# Patient Record
Sex: FEMALE | Race: WHITE | NOT HISPANIC OR LATINO | Employment: OTHER | URBAN - METROPOLITAN AREA
[De-identification: names, ages, dates, MRNs, and addresses within clinical notes are randomized per-mention and may not be internally consistent; named-entity substitution may affect disease eponyms.]

---

## 2020-01-01 ENCOUNTER — HOSPITAL ENCOUNTER (OUTPATIENT)
Dept: RADIOLOGY | Facility: MEDICAL CENTER | Age: 80
End: 2020-05-01
Payer: MEDICARE

## 2020-01-01 ENCOUNTER — APPOINTMENT (OUTPATIENT)
Dept: RADIOLOGY | Facility: MEDICAL CENTER | Age: 80
DRG: 870 | End: 2020-01-01
Attending: INTERNAL MEDICINE
Payer: MEDICARE

## 2020-01-01 ENCOUNTER — PATIENT OUTREACH (OUTPATIENT)
Dept: HEALTH INFORMATION MANAGEMENT | Facility: OTHER | Age: 80
End: 2020-01-01

## 2020-01-01 ENCOUNTER — HOSPITAL ENCOUNTER (INPATIENT)
Facility: MEDICAL CENTER | Age: 80
LOS: 15 days | DRG: 870 | End: 2020-05-16
Attending: EMERGENCY MEDICINE | Admitting: HOSPITALIST
Payer: MEDICARE

## 2020-01-01 ENCOUNTER — APPOINTMENT (OUTPATIENT)
Dept: CARDIOLOGY | Facility: MEDICAL CENTER | Age: 80
DRG: 870 | End: 2020-01-01
Attending: HOSPITALIST
Payer: MEDICARE

## 2020-01-01 ENCOUNTER — APPOINTMENT (OUTPATIENT)
Dept: RADIOLOGY | Facility: MEDICAL CENTER | Age: 80
DRG: 870 | End: 2020-01-01
Attending: HOSPITALIST
Payer: MEDICARE

## 2020-01-01 DIAGNOSIS — N18.9 CHRONIC RENAL FAILURE, UNSPECIFIED CKD STAGE: ICD-10-CM

## 2020-01-01 DIAGNOSIS — J96.01 ACUTE RESPIRATORY FAILURE WITH HYPOXIA AND HYPERCAPNIA (HCC): ICD-10-CM

## 2020-01-01 DIAGNOSIS — I50.9 CONGESTIVE HEART FAILURE, UNSPECIFIED HF CHRONICITY, UNSPECIFIED HEART FAILURE TYPE (HCC): ICD-10-CM

## 2020-01-01 DIAGNOSIS — Z51.5 COMFORT MEASURES ONLY STATUS: ICD-10-CM

## 2020-01-01 DIAGNOSIS — J96.02 ACUTE RESPIRATORY FAILURE WITH HYPOXIA AND HYPERCAPNIA (HCC): ICD-10-CM

## 2020-01-01 DIAGNOSIS — I27.20 PULMONARY HYPERTENSION (HCC): ICD-10-CM

## 2020-01-01 LAB
ACTION RANGE TRIGGERED IACRT: NO
ACTION RANGE TRIGGERED IACRT: YES
ALBUMIN SERPL BCP-MCNC: 2.6 G/DL (ref 3.2–4.9)
ALBUMIN SERPL BCP-MCNC: 2.9 G/DL (ref 3.2–4.9)
ALBUMIN SERPL BCP-MCNC: 2.9 G/DL (ref 3.2–4.9)
ALBUMIN SERPL BCP-MCNC: 3.2 G/DL (ref 3.2–4.9)
ALBUMIN SERPL BCP-MCNC: 3.3 G/DL (ref 3.2–4.9)
ALBUMIN/GLOB SERPL: 0.9 G/DL
ALBUMIN/GLOB SERPL: 1 G/DL
ALBUMIN/GLOB SERPL: 1 G/DL
ALBUMIN/GLOB SERPL: 1.1 G/DL
ALBUMIN/GLOB SERPL: 1.2 G/DL
ALP SERPL-CCNC: 61 U/L (ref 30–99)
ALP SERPL-CCNC: 63 U/L (ref 30–99)
ALP SERPL-CCNC: 73 U/L (ref 30–99)
ALP SERPL-CCNC: 74 U/L (ref 30–99)
ALP SERPL-CCNC: 75 U/L (ref 30–99)
ALT SERPL-CCNC: 13 U/L (ref 2–50)
ALT SERPL-CCNC: 21 U/L (ref 2–50)
ALT SERPL-CCNC: 22 U/L (ref 2–50)
ALT SERPL-CCNC: 35 U/L (ref 2–50)
ALT SERPL-CCNC: 38 U/L (ref 2–50)
AMMONIA PLAS-SCNC: 21 UMOL/L (ref 11–45)
ANION GAP SERPL CALC-SCNC: 10 MMOL/L (ref 7–16)
ANION GAP SERPL CALC-SCNC: 11 MMOL/L (ref 7–16)
ANION GAP SERPL CALC-SCNC: 11 MMOL/L (ref 7–16)
ANION GAP SERPL CALC-SCNC: 12 MMOL/L (ref 7–16)
ANION GAP SERPL CALC-SCNC: 13 MMOL/L (ref 7–16)
ANION GAP SERPL CALC-SCNC: 15 MMOL/L (ref 7–16)
ANION GAP SERPL CALC-SCNC: 16 MMOL/L (ref 7–16)
ANION GAP SERPL CALC-SCNC: 16 MMOL/L (ref 7–16)
ANION GAP SERPL CALC-SCNC: 20 MMOL/L (ref 7–16)
ANISOCYTOSIS BLD QL SMEAR: ABNORMAL
APPEARANCE UR: CLEAR
APTT PPP: 116.8 SEC (ref 24.7–36)
APTT PPP: 222.5 SEC (ref 24.7–36)
APTT PPP: 27.3 SEC (ref 24.7–36)
APTT PPP: 47.3 SEC (ref 24.7–36)
APTT PPP: 60.2 SEC (ref 24.7–36)
APTT PPP: 60.4 SEC (ref 24.7–36)
APTT PPP: 80.1 SEC (ref 24.7–36)
APTT PPP: 93 SEC (ref 24.7–36)
APTT PPP: >240 SEC (ref 24.7–36)
AST SERPL-CCNC: 18 U/L (ref 12–45)
AST SERPL-CCNC: 25 U/L (ref 12–45)
AST SERPL-CCNC: 26 U/L (ref 12–45)
AST SERPL-CCNC: 31 U/L (ref 12–45)
AST SERPL-CCNC: 39 U/L (ref 12–45)
BACTERIA BLD CULT: NORMAL
BACTERIA BLD CULT: NORMAL
BACTERIA SPEC RESP CULT: NORMAL
BASE EXCESS BLDA CALC-SCNC: 10 MMOL/L (ref -4–3)
BASE EXCESS BLDA CALC-SCNC: 13 MMOL/L (ref -4–3)
BASE EXCESS BLDA CALC-SCNC: 13 MMOL/L (ref -4–3)
BASE EXCESS BLDA CALC-SCNC: 2 MMOL/L (ref -4–3)
BASE EXCESS BLDA CALC-SCNC: 3 MMOL/L (ref -4–3)
BASE EXCESS BLDA CALC-SCNC: 3 MMOL/L (ref -4–3)
BASE EXCESS BLDA CALC-SCNC: 6 MMOL/L (ref -4–3)
BASE EXCESS BLDA CALC-SCNC: 7 MMOL/L (ref -4–3)
BASE EXCESS BLDA CALC-SCNC: 7 MMOL/L (ref -4–3)
BASE EXCESS BLDA CALC-SCNC: 8 MMOL/L (ref -4–3)
BASE EXCESS BLDA CALC-SCNC: 9 MMOL/L (ref -4–3)
BASE EXCESS BLDA CALC-SCNC: ABNORMAL MMOL/L (ref -4–3)
BASE EXCESS BLDA CALC-SCNC: ABNORMAL MMOL/L (ref -4–3)
BASOPHILS # BLD AUTO: 0.1 % (ref 0–1.8)
BASOPHILS # BLD AUTO: 0.2 % (ref 0–1.8)
BASOPHILS # BLD AUTO: 0.3 % (ref 0–1.8)
BASOPHILS # BLD: 0.02 K/UL (ref 0–0.12)
BASOPHILS # BLD: 0.03 K/UL (ref 0–0.12)
BASOPHILS # BLD: 0.04 K/UL (ref 0–0.12)
BILIRUB SERPL-MCNC: 0.4 MG/DL (ref 0.1–1.5)
BILIRUB SERPL-MCNC: 0.6 MG/DL (ref 0.1–1.5)
BILIRUB SERPL-MCNC: 0.6 MG/DL (ref 0.1–1.5)
BILIRUB SERPL-MCNC: 0.7 MG/DL (ref 0.1–1.5)
BILIRUB SERPL-MCNC: 0.7 MG/DL (ref 0.1–1.5)
BILIRUB UR QL STRIP.AUTO: NEGATIVE
BODY TEMPERATURE: ABNORMAL DEGREES
BUN SERPL-MCNC: 115 MG/DL (ref 8–22)
BUN SERPL-MCNC: 133 MG/DL (ref 8–22)
BUN SERPL-MCNC: 33 MG/DL (ref 8–22)
BUN SERPL-MCNC: 36 MG/DL (ref 8–22)
BUN SERPL-MCNC: 41 MG/DL (ref 8–22)
BUN SERPL-MCNC: 43 MG/DL (ref 8–22)
BUN SERPL-MCNC: 50 MG/DL (ref 8–22)
BUN SERPL-MCNC: 53 MG/DL (ref 8–22)
BUN SERPL-MCNC: 64 MG/DL (ref 8–22)
BUN SERPL-MCNC: 67 MG/DL (ref 8–22)
BUN SERPL-MCNC: 79 MG/DL (ref 8–22)
BUN SERPL-MCNC: 85 MG/DL (ref 8–22)
BUN SERPL-MCNC: 89 MG/DL (ref 8–22)
BUN SERPL-MCNC: 94 MG/DL (ref 8–22)
BUN SERPL-MCNC: 96 MG/DL (ref 8–22)
C DIFF DNA SPEC QL NAA+PROBE: NEGATIVE
C DIFF TOX GENS STL QL NAA+PROBE: NEGATIVE
CA-I BLD ISE-SCNC: 1.19 MMOL/L (ref 1.1–1.3)
CALCIUM SERPL-MCNC: 10 MG/DL (ref 8.5–10.5)
CALCIUM SERPL-MCNC: 8.3 MG/DL (ref 8.5–10.5)
CALCIUM SERPL-MCNC: 8.5 MG/DL (ref 8.5–10.5)
CALCIUM SERPL-MCNC: 8.7 MG/DL (ref 8.5–10.5)
CALCIUM SERPL-MCNC: 8.8 MG/DL (ref 8.5–10.5)
CALCIUM SERPL-MCNC: 8.8 MG/DL (ref 8.5–10.5)
CALCIUM SERPL-MCNC: 9 MG/DL (ref 8.5–10.5)
CALCIUM SERPL-MCNC: 9.1 MG/DL (ref 8.5–10.5)
CALCIUM SERPL-MCNC: 9.3 MG/DL (ref 8.5–10.5)
CALCIUM SERPL-MCNC: 9.5 MG/DL (ref 8.5–10.5)
CALCIUM SERPL-MCNC: 9.7 MG/DL (ref 8.5–10.5)
CALCIUM SERPL-MCNC: 9.8 MG/DL (ref 8.5–10.5)
CALCIUM SERPL-MCNC: 9.9 MG/DL (ref 8.5–10.5)
CHLORIDE SERPL-SCNC: 100 MMOL/L (ref 96–112)
CHLORIDE SERPL-SCNC: 90 MMOL/L (ref 96–112)
CHLORIDE SERPL-SCNC: 91 MMOL/L (ref 96–112)
CHLORIDE SERPL-SCNC: 92 MMOL/L (ref 96–112)
CHLORIDE SERPL-SCNC: 93 MMOL/L (ref 96–112)
CHLORIDE SERPL-SCNC: 94 MMOL/L (ref 96–112)
CHLORIDE SERPL-SCNC: 94 MMOL/L (ref 96–112)
CHLORIDE SERPL-SCNC: 95 MMOL/L (ref 96–112)
CHLORIDE SERPL-SCNC: 96 MMOL/L (ref 96–112)
CHLORIDE SERPL-SCNC: 98 MMOL/L (ref 96–112)
CHLORIDE SERPL-SCNC: 99 MMOL/L (ref 96–112)
CHLORIDE UR-SCNC: 73 MMOL/L
CO2 BLDA-SCNC: 31 MMOL/L (ref 20–33)
CO2 BLDA-SCNC: 32 MMOL/L (ref 20–33)
CO2 BLDA-SCNC: 33 MMOL/L (ref 20–33)
CO2 BLDA-SCNC: 33 MMOL/L (ref 20–33)
CO2 BLDA-SCNC: 35 MMOL/L (ref 20–33)
CO2 BLDA-SCNC: 36 MMOL/L (ref 20–33)
CO2 BLDA-SCNC: 37 MMOL/L (ref 20–33)
CO2 BLDA-SCNC: 38 MMOL/L (ref 20–33)
CO2 BLDA-SCNC: 38 MMOL/L (ref 20–33)
CO2 BLDA-SCNC: 39 MMOL/L (ref 20–33)
CO2 BLDA-SCNC: 39 MMOL/L (ref 20–33)
CO2 BLDA-SCNC: 40 MMOL/L (ref 20–33)
CO2 BLDA-SCNC: ABNORMAL MMOL/L (ref 20–33)
CO2 BLDA-SCNC: ABNORMAL MMOL/L (ref 20–33)
CO2 SERPL-SCNC: 26 MMOL/L (ref 20–33)
CO2 SERPL-SCNC: 28 MMOL/L (ref 20–33)
CO2 SERPL-SCNC: 29 MMOL/L (ref 20–33)
CO2 SERPL-SCNC: 29 MMOL/L (ref 20–33)
CO2 SERPL-SCNC: 30 MMOL/L (ref 20–33)
CO2 SERPL-SCNC: 31 MMOL/L (ref 20–33)
CO2 SERPL-SCNC: 32 MMOL/L (ref 20–33)
CO2 SERPL-SCNC: 33 MMOL/L (ref 20–33)
COLOR UR: YELLOW
COMMENT 1642: NORMAL
COMMENT 1642: NORMAL
CORTIS SERPL-MCNC: 22.1 UG/DL (ref 0–23)
COVID ORDER STATUS COVID19: NORMAL
CREAT SERPL-MCNC: 0.84 MG/DL (ref 0.5–1.4)
CREAT SERPL-MCNC: 0.88 MG/DL (ref 0.5–1.4)
CREAT SERPL-MCNC: 0.9 MG/DL (ref 0.5–1.4)
CREAT SERPL-MCNC: 0.99 MG/DL (ref 0.5–1.4)
CREAT SERPL-MCNC: 1.11 MG/DL (ref 0.5–1.4)
CREAT SERPL-MCNC: 1.18 MG/DL (ref 0.5–1.4)
CREAT SERPL-MCNC: 1.21 MG/DL (ref 0.5–1.4)
CREAT SERPL-MCNC: 1.28 MG/DL (ref 0.5–1.4)
CREAT SERPL-MCNC: 1.33 MG/DL (ref 0.5–1.4)
CREAT SERPL-MCNC: 1.42 MG/DL (ref 0.5–1.4)
CREAT SERPL-MCNC: 1.42 MG/DL (ref 0.5–1.4)
CREAT SERPL-MCNC: 2.08 MG/DL (ref 0.5–1.4)
CREAT SERPL-MCNC: 2.41 MG/DL (ref 0.5–1.4)
CREAT UR-MCNC: 30.66 MG/DL
CRP SERPL HS-MCNC: 1.56 MG/DL (ref 0–0.75)
CRP SERPL HS-MCNC: 11.11 MG/DL (ref 0–0.75)
CRP SERPL HS-MCNC: 3.03 MG/DL (ref 0–0.75)
CRP SERPL HS-MCNC: 9.33 MG/DL (ref 0–0.75)
D DIMER PPP IA.FEU-MCNC: 1.19 UG/ML (FEU) (ref 0–0.5)
EKG IMPRESSION: NORMAL
EOSINOPHIL # BLD AUTO: 0 K/UL (ref 0–0.51)
EOSINOPHIL # BLD AUTO: 0.01 K/UL (ref 0–0.51)
EOSINOPHIL # BLD AUTO: 0.02 K/UL (ref 0–0.51)
EOSINOPHIL # BLD AUTO: 0.09 K/UL (ref 0–0.51)
EOSINOPHIL # BLD AUTO: 0.1 K/UL (ref 0–0.51)
EOSINOPHIL # BLD AUTO: 0.15 K/UL (ref 0–0.51)
EOSINOPHIL # BLD AUTO: 0.16 K/UL (ref 0–0.51)
EOSINOPHIL # BLD AUTO: 0.27 K/UL (ref 0–0.51)
EOSINOPHIL # BLD AUTO: 0.29 K/UL (ref 0–0.51)
EOSINOPHIL # BLD AUTO: 0.36 K/UL (ref 0–0.51)
EOSINOPHIL # BLD AUTO: 0.44 K/UL (ref 0–0.51)
EOSINOPHIL NFR BLD: 0 % (ref 0–6.9)
EOSINOPHIL NFR BLD: 0.1 % (ref 0–6.9)
EOSINOPHIL NFR BLD: 0.7 % (ref 0–6.9)
EOSINOPHIL NFR BLD: 0.8 % (ref 0–6.9)
EOSINOPHIL NFR BLD: 1.3 % (ref 0–6.9)
EOSINOPHIL NFR BLD: 1.5 % (ref 0–6.9)
EOSINOPHIL NFR BLD: 1.6 % (ref 0–6.9)
EOSINOPHIL NFR BLD: 2 % (ref 0–6.9)
EOSINOPHIL NFR BLD: 3.5 % (ref 0–6.9)
EOSINOPHIL NFR BLD: 3.6 % (ref 0–6.9)
ERYTHROCYTE [DISTWIDTH] IN BLOOD BY AUTOMATED COUNT: 57.1 FL (ref 35.9–50)
ERYTHROCYTE [DISTWIDTH] IN BLOOD BY AUTOMATED COUNT: 57.7 FL (ref 35.9–50)
ERYTHROCYTE [DISTWIDTH] IN BLOOD BY AUTOMATED COUNT: 58.6 FL (ref 35.9–50)
ERYTHROCYTE [DISTWIDTH] IN BLOOD BY AUTOMATED COUNT: 58.6 FL (ref 35.9–50)
ERYTHROCYTE [DISTWIDTH] IN BLOOD BY AUTOMATED COUNT: 59.6 FL (ref 35.9–50)
ERYTHROCYTE [DISTWIDTH] IN BLOOD BY AUTOMATED COUNT: 59.7 FL (ref 35.9–50)
ERYTHROCYTE [DISTWIDTH] IN BLOOD BY AUTOMATED COUNT: 59.9 FL (ref 35.9–50)
ERYTHROCYTE [DISTWIDTH] IN BLOOD BY AUTOMATED COUNT: 60 FL (ref 35.9–50)
ERYTHROCYTE [DISTWIDTH] IN BLOOD BY AUTOMATED COUNT: 60.3 FL (ref 35.9–50)
ERYTHROCYTE [DISTWIDTH] IN BLOOD BY AUTOMATED COUNT: 60.6 FL (ref 35.9–50)
ERYTHROCYTE [DISTWIDTH] IN BLOOD BY AUTOMATED COUNT: 61.1 FL (ref 35.9–50)
ERYTHROCYTE [DISTWIDTH] IN BLOOD BY AUTOMATED COUNT: 61.3 FL (ref 35.9–50)
ERYTHROCYTE [DISTWIDTH] IN BLOOD BY AUTOMATED COUNT: 62.5 FL (ref 35.9–50)
ERYTHROCYTE [DISTWIDTH] IN BLOOD BY AUTOMATED COUNT: 63.5 FL (ref 35.9–50)
ERYTHROCYTE [DISTWIDTH] IN BLOOD BY AUTOMATED COUNT: 64.5 FL (ref 35.9–50)
FERRITIN SERPL-MCNC: 14.7 NG/ML (ref 10–291)
GLOBULIN SER CALC-MCNC: 2.4 G/DL (ref 1.9–3.5)
GLOBULIN SER CALC-MCNC: 2.6 G/DL (ref 1.9–3.5)
GLOBULIN SER CALC-MCNC: 2.7 G/DL (ref 1.9–3.5)
GLOBULIN SER CALC-MCNC: 3.2 G/DL (ref 1.9–3.5)
GLOBULIN SER CALC-MCNC: 3.6 G/DL (ref 1.9–3.5)
GLUCOSE BLD-MCNC: 83 MG/DL (ref 65–99)
GLUCOSE SERPL-MCNC: 111 MG/DL (ref 65–99)
GLUCOSE SERPL-MCNC: 117 MG/DL (ref 65–99)
GLUCOSE SERPL-MCNC: 126 MG/DL (ref 65–99)
GLUCOSE SERPL-MCNC: 126 MG/DL (ref 65–99)
GLUCOSE SERPL-MCNC: 127 MG/DL (ref 65–99)
GLUCOSE SERPL-MCNC: 134 MG/DL (ref 65–99)
GLUCOSE SERPL-MCNC: 138 MG/DL (ref 65–99)
GLUCOSE SERPL-MCNC: 156 MG/DL (ref 65–99)
GLUCOSE SERPL-MCNC: 160 MG/DL (ref 65–99)
GLUCOSE SERPL-MCNC: 174 MG/DL (ref 65–99)
GLUCOSE SERPL-MCNC: 178 MG/DL (ref 65–99)
GLUCOSE SERPL-MCNC: 199 MG/DL (ref 65–99)
GLUCOSE SERPL-MCNC: 80 MG/DL (ref 65–99)
GLUCOSE SERPL-MCNC: 88 MG/DL (ref 65–99)
GLUCOSE SERPL-MCNC: 98 MG/DL (ref 65–99)
GLUCOSE UR STRIP.AUTO-MCNC: NEGATIVE MG/DL
GRAM STN SPEC: NORMAL
GRAM STN SPEC: NORMAL
HCO3 BLDA-SCNC: 29.4 MMOL/L (ref 17–25)
HCO3 BLDA-SCNC: 29.8 MMOL/L (ref 17–25)
HCO3 BLDA-SCNC: 30.4 MMOL/L (ref 17–25)
HCO3 BLDA-SCNC: 30.7 MMOL/L (ref 17–25)
HCO3 BLDA-SCNC: 30.9 MMOL/L (ref 17–25)
HCO3 BLDA-SCNC: 31 MMOL/L (ref 17–25)
HCO3 BLDA-SCNC: 31.5 MMOL/L (ref 17–25)
HCO3 BLDA-SCNC: 32.3 MMOL/L (ref 17–25)
HCO3 BLDA-SCNC: 32.5 MMOL/L (ref 17–25)
HCO3 BLDA-SCNC: 33.6 MMOL/L (ref 17–25)
HCO3 BLDA-SCNC: 33.8 MMOL/L (ref 17–25)
HCO3 BLDA-SCNC: 33.9 MMOL/L (ref 17–25)
HCO3 BLDA-SCNC: 34.3 MMOL/L (ref 17–25)
HCO3 BLDA-SCNC: 34.4 MMOL/L (ref 17–25)
HCO3 BLDA-SCNC: 35.3 MMOL/L (ref 17–25)
HCO3 BLDA-SCNC: 36.5 MMOL/L (ref 17–25)
HCO3 BLDA-SCNC: 36.6 MMOL/L (ref 17–25)
HCO3 BLDA-SCNC: 37.2 MMOL/L (ref 17–25)
HCO3 BLDA-SCNC: 37.8 MMOL/L (ref 17–25)
HCO3 BLDA-SCNC: 38 MMOL/L (ref 17–25)
HCO3 BLDA-SCNC: ABNORMAL MMOL/L (ref 17–25)
HCO3 BLDA-SCNC: ABNORMAL MMOL/L (ref 17–25)
HCT VFR BLD AUTO: 41.8 % (ref 37–47)
HCT VFR BLD AUTO: 41.9 % (ref 37–47)
HCT VFR BLD AUTO: 42.5 % (ref 37–47)
HCT VFR BLD AUTO: 42.8 % (ref 37–47)
HCT VFR BLD AUTO: 43 % (ref 37–47)
HCT VFR BLD AUTO: 43 % (ref 37–47)
HCT VFR BLD AUTO: 43.1 % (ref 37–47)
HCT VFR BLD AUTO: 43.4 % (ref 37–47)
HCT VFR BLD AUTO: 43.6 % (ref 37–47)
HCT VFR BLD AUTO: 43.7 % (ref 37–47)
HCT VFR BLD AUTO: 44.6 % (ref 37–47)
HCT VFR BLD AUTO: 44.7 % (ref 37–47)
HCT VFR BLD AUTO: 45.4 % (ref 37–47)
HCT VFR BLD AUTO: 46.1 % (ref 37–47)
HCT VFR BLD AUTO: 51.7 % (ref 37–47)
HCT VFR BLD CALC: 49 % (ref 37–47)
HGB BLD-MCNC: 12.3 G/DL (ref 12–16)
HGB BLD-MCNC: 12.8 G/DL (ref 12–16)
HGB BLD-MCNC: 12.9 G/DL (ref 12–16)
HGB BLD-MCNC: 13 G/DL (ref 12–16)
HGB BLD-MCNC: 13.2 G/DL (ref 12–16)
HGB BLD-MCNC: 13.3 G/DL (ref 12–16)
HGB BLD-MCNC: 13.3 G/DL (ref 12–16)
HGB BLD-MCNC: 13.4 G/DL (ref 12–16)
HGB BLD-MCNC: 13.5 G/DL (ref 12–16)
HGB BLD-MCNC: 13.5 G/DL (ref 12–16)
HGB BLD-MCNC: 13.6 G/DL (ref 12–16)
HGB BLD-MCNC: 13.7 G/DL (ref 12–16)
HGB BLD-MCNC: 15.1 G/DL (ref 12–16)
HGB BLD-MCNC: 16.7 G/DL (ref 12–16)
HOROWITZ INDEX BLDA+IHG-RTO: 125 MM[HG]
HOROWITZ INDEX BLDA+IHG-RTO: 126 MM[HG]
HOROWITZ INDEX BLDA+IHG-RTO: 140 MM[HG]
HOROWITZ INDEX BLDA+IHG-RTO: 165 MM[HG]
HOROWITZ INDEX BLDA+IHG-RTO: 180 MM[HG]
HOROWITZ INDEX BLDA+IHG-RTO: 190 MM[HG]
HOROWITZ INDEX BLDA+IHG-RTO: 193 MM[HG]
HOROWITZ INDEX BLDA+IHG-RTO: 208 MM[HG]
HOROWITZ INDEX BLDA+IHG-RTO: 213 MM[HG]
HOROWITZ INDEX BLDA+IHG-RTO: 218 MM[HG]
HOROWITZ INDEX BLDA+IHG-RTO: 218 MM[HG]
HOROWITZ INDEX BLDA+IHG-RTO: 230 MM[HG]
HOROWITZ INDEX BLDA+IHG-RTO: 240 MM[HG]
HOROWITZ INDEX BLDA+IHG-RTO: 245 MM[HG]
HOROWITZ INDEX BLDA+IHG-RTO: 260 MM[HG]
HOROWITZ INDEX BLDA+IHG-RTO: 260 MM[HG]
HOROWITZ INDEX BLDA+IHG-RTO: 267 MM[HG]
HOROWITZ INDEX BLDA+IHG-RTO: 284 MM[HG]
HOROWITZ INDEX BLDA+IHG-RTO: 295 MM[HG]
HOROWITZ INDEX BLDA+IHG-RTO: 330 MM[HG]
HOROWITZ INDEX BLDA+IHG-RTO: 337 MM[HG]
HOROWITZ INDEX BLDA+IHG-RTO: 98 MM[HG]
HYPOCHROMIA BLD QL SMEAR: ABNORMAL
HYPOCHROMIA BLD QL SMEAR: ABNORMAL
IMM GRANULOCYTES # BLD AUTO: 0.03 K/UL (ref 0–0.11)
IMM GRANULOCYTES # BLD AUTO: 0.05 K/UL (ref 0–0.11)
IMM GRANULOCYTES # BLD AUTO: 0.06 K/UL (ref 0–0.11)
IMM GRANULOCYTES # BLD AUTO: 0.06 K/UL (ref 0–0.11)
IMM GRANULOCYTES # BLD AUTO: 0.07 K/UL (ref 0–0.11)
IMM GRANULOCYTES # BLD AUTO: 0.1 K/UL (ref 0–0.11)
IMM GRANULOCYTES # BLD AUTO: 0.18 K/UL (ref 0–0.11)
IMM GRANULOCYTES # BLD AUTO: 0.21 K/UL (ref 0–0.11)
IMM GRANULOCYTES # BLD AUTO: 0.23 K/UL (ref 0–0.11)
IMM GRANULOCYTES # BLD AUTO: 0.28 K/UL (ref 0–0.11)
IMM GRANULOCYTES # BLD AUTO: 0.38 K/UL (ref 0–0.11)
IMM GRANULOCYTES # BLD AUTO: 0.44 K/UL (ref 0–0.11)
IMM GRANULOCYTES NFR BLD AUTO: 0.2 % (ref 0–0.9)
IMM GRANULOCYTES NFR BLD AUTO: 0.4 % (ref 0–0.9)
IMM GRANULOCYTES NFR BLD AUTO: 0.5 % (ref 0–0.9)
IMM GRANULOCYTES NFR BLD AUTO: 0.6 % (ref 0–0.9)
IMM GRANULOCYTES NFR BLD AUTO: 0.6 % (ref 0–0.9)
IMM GRANULOCYTES NFR BLD AUTO: 1.1 % (ref 0–0.9)
IMM GRANULOCYTES NFR BLD AUTO: 1.3 % (ref 0–0.9)
IMM GRANULOCYTES NFR BLD AUTO: 2 % (ref 0–0.9)
IMM GRANULOCYTES NFR BLD AUTO: 2.2 % (ref 0–0.9)
INR PPP: 1.2 (ref 0.87–1.13)
INST. QUALIFIED PATIENT IIQPT: YES
KETONES UR STRIP.AUTO-MCNC: NEGATIVE MG/DL
LACTATE BLD-SCNC: 0.8 MMOL/L (ref 0.5–2)
LACTATE BLD-SCNC: 1.4 MMOL/L (ref 0.5–2)
LACTATE BLD-SCNC: 1.5 MMOL/L (ref 0.5–2)
LACTATE BLD-SCNC: 1.7 MMOL/L (ref 0.5–2)
LDH SERPL L TO P-CCNC: 122 U/L (ref 107–266)
LEUKOCYTE ESTERASE UR QL STRIP.AUTO: NEGATIVE
LV EJECT FRACT  99904: 75
LV EJECT FRACT MOD 2C 99903: 70.48
LV EJECT FRACT MOD 4C 99902: 83.37
LV EJECT FRACT MOD BP 99901: 77.88
LYMPHOCYTES # BLD AUTO: 0.51 K/UL (ref 1–4.8)
LYMPHOCYTES # BLD AUTO: 0.69 K/UL (ref 1–4.8)
LYMPHOCYTES # BLD AUTO: 1.04 K/UL (ref 1–4.8)
LYMPHOCYTES # BLD AUTO: 1.36 K/UL (ref 1–4.8)
LYMPHOCYTES # BLD AUTO: 1.38 K/UL (ref 1–4.8)
LYMPHOCYTES # BLD AUTO: 1.47 K/UL (ref 1–4.8)
LYMPHOCYTES # BLD AUTO: 1.5 K/UL (ref 1–4.8)
LYMPHOCYTES # BLD AUTO: 1.53 K/UL (ref 1–4.8)
LYMPHOCYTES # BLD AUTO: 1.76 K/UL (ref 1–4.8)
LYMPHOCYTES # BLD AUTO: 1.94 K/UL (ref 1–4.8)
LYMPHOCYTES # BLD AUTO: 1.94 K/UL (ref 1–4.8)
LYMPHOCYTES # BLD AUTO: 2.36 K/UL (ref 1–4.8)
LYMPHOCYTES # BLD AUTO: 2.68 K/UL (ref 1–4.8)
LYMPHOCYTES # BLD AUTO: 3 K/UL (ref 1–4.8)
LYMPHOCYTES NFR BLD: 11 % (ref 22–41)
LYMPHOCYTES NFR BLD: 11.9 % (ref 22–41)
LYMPHOCYTES NFR BLD: 14 % (ref 22–41)
LYMPHOCYTES NFR BLD: 15.6 % (ref 22–41)
LYMPHOCYTES NFR BLD: 17.3 % (ref 22–41)
LYMPHOCYTES NFR BLD: 17.6 % (ref 22–41)
LYMPHOCYTES NFR BLD: 18.9 % (ref 22–41)
LYMPHOCYTES NFR BLD: 18.9 % (ref 22–41)
LYMPHOCYTES NFR BLD: 19.6 % (ref 22–41)
LYMPHOCYTES NFR BLD: 3.1 % (ref 22–41)
LYMPHOCYTES NFR BLD: 3.4 % (ref 22–41)
LYMPHOCYTES NFR BLD: 5.5 % (ref 22–41)
LYMPHOCYTES NFR BLD: 7 % (ref 22–41)
LYMPHOCYTES NFR BLD: 7 % (ref 22–41)
MAGNESIUM SERPL-MCNC: 1.7 MG/DL (ref 1.5–2.5)
MAGNESIUM SERPL-MCNC: 2 MG/DL (ref 1.5–2.5)
MAGNESIUM SERPL-MCNC: 2 MG/DL (ref 1.5–2.5)
MAGNESIUM SERPL-MCNC: 2.1 MG/DL (ref 1.5–2.5)
MAGNESIUM SERPL-MCNC: 2.3 MG/DL (ref 1.5–2.5)
MAGNESIUM SERPL-MCNC: 2.5 MG/DL (ref 1.5–2.5)
MAGNESIUM SERPL-MCNC: 2.8 MG/DL (ref 1.5–2.5)
MAGNESIUM SERPL-MCNC: 3 MG/DL (ref 1.5–2.5)
MCH RBC QN AUTO: 26 PG (ref 27–33)
MCH RBC QN AUTO: 26.1 PG (ref 27–33)
MCH RBC QN AUTO: 26.2 PG (ref 27–33)
MCH RBC QN AUTO: 26.2 PG (ref 27–33)
MCH RBC QN AUTO: 26.3 PG (ref 27–33)
MCH RBC QN AUTO: 26.4 PG (ref 27–33)
MCH RBC QN AUTO: 26.8 PG (ref 27–33)
MCH RBC QN AUTO: 26.9 PG (ref 27–33)
MCH RBC QN AUTO: 26.9 PG (ref 27–33)
MCH RBC QN AUTO: 27 PG (ref 27–33)
MCHC RBC AUTO-ENTMCNC: 28.2 G/DL (ref 33.6–35)
MCHC RBC AUTO-ENTMCNC: 28.9 G/DL (ref 33.6–35)
MCHC RBC AUTO-ENTMCNC: 29.2 G/DL (ref 33.6–35)
MCHC RBC AUTO-ENTMCNC: 29.4 G/DL (ref 33.6–35)
MCHC RBC AUTO-ENTMCNC: 29.8 G/DL (ref 33.6–35)
MCHC RBC AUTO-ENTMCNC: 30 G/DL (ref 33.6–35)
MCHC RBC AUTO-ENTMCNC: 30.6 G/DL (ref 33.6–35)
MCHC RBC AUTO-ENTMCNC: 30.6 G/DL (ref 33.6–35)
MCHC RBC AUTO-ENTMCNC: 30.7 G/DL (ref 33.6–35)
MCHC RBC AUTO-ENTMCNC: 30.7 G/DL (ref 33.6–35)
MCHC RBC AUTO-ENTMCNC: 30.9 G/DL (ref 33.6–35)
MCHC RBC AUTO-ENTMCNC: 30.9 G/DL (ref 33.6–35)
MCHC RBC AUTO-ENTMCNC: 31.1 G/DL (ref 33.6–35)
MCHC RBC AUTO-ENTMCNC: 31.5 G/DL (ref 33.6–35)
MCHC RBC AUTO-ENTMCNC: 31.6 G/DL (ref 33.6–35)
MCV RBC AUTO: 84.8 FL (ref 81.4–97.8)
MCV RBC AUTO: 85 FL (ref 81.4–97.8)
MCV RBC AUTO: 85.1 FL (ref 81.4–97.8)
MCV RBC AUTO: 85.3 FL (ref 81.4–97.8)
MCV RBC AUTO: 85.3 FL (ref 81.4–97.8)
MCV RBC AUTO: 85.4 FL (ref 81.4–97.8)
MCV RBC AUTO: 85.5 FL (ref 81.4–97.8)
MCV RBC AUTO: 85.7 FL (ref 81.4–97.8)
MCV RBC AUTO: 87.8 FL (ref 81.4–97.8)
MCV RBC AUTO: 87.9 FL (ref 81.4–97.8)
MCV RBC AUTO: 89.1 FL (ref 81.4–97.8)
MCV RBC AUTO: 89.4 FL (ref 81.4–97.8)
MCV RBC AUTO: 90.5 FL (ref 81.4–97.8)
MCV RBC AUTO: 90.5 FL (ref 81.4–97.8)
MCV RBC AUTO: 92.2 FL (ref 81.4–97.8)
MICRO URNS: NORMAL
MICROCYTES BLD QL SMEAR: ABNORMAL
MONOCYTES # BLD AUTO: 0.6 K/UL (ref 0–0.85)
MONOCYTES # BLD AUTO: 0.88 K/UL (ref 0–0.85)
MONOCYTES # BLD AUTO: 0.88 K/UL (ref 0–0.85)
MONOCYTES # BLD AUTO: 0.95 K/UL (ref 0–0.85)
MONOCYTES # BLD AUTO: 1.02 K/UL (ref 0–0.85)
MONOCYTES # BLD AUTO: 1.06 K/UL (ref 0–0.85)
MONOCYTES # BLD AUTO: 1.21 K/UL (ref 0–0.85)
MONOCYTES # BLD AUTO: 1.25 K/UL (ref 0–0.85)
MONOCYTES # BLD AUTO: 1.25 K/UL (ref 0–0.85)
MONOCYTES # BLD AUTO: 1.33 K/UL (ref 0–0.85)
MONOCYTES # BLD AUTO: 1.4 K/UL (ref 0–0.85)
MONOCYTES # BLD AUTO: 1.41 K/UL (ref 0–0.85)
MONOCYTES # BLD AUTO: 1.68 K/UL (ref 0–0.85)
MONOCYTES # BLD AUTO: 1.74 K/UL (ref 0–0.85)
MONOCYTES NFR BLD AUTO: 10.3 % (ref 0–13.4)
MONOCYTES NFR BLD AUTO: 10.6 % (ref 0–13.4)
MONOCYTES NFR BLD AUTO: 11.2 % (ref 0–13.4)
MONOCYTES NFR BLD AUTO: 12.5 % (ref 0–13.4)
MONOCYTES NFR BLD AUTO: 3.6 % (ref 0–13.4)
MONOCYTES NFR BLD AUTO: 4.7 % (ref 0–13.4)
MONOCYTES NFR BLD AUTO: 6.2 % (ref 0–13.4)
MONOCYTES NFR BLD AUTO: 6.3 % (ref 0–13.4)
MONOCYTES NFR BLD AUTO: 7.9 % (ref 0–13.4)
MONOCYTES NFR BLD AUTO: 8 % (ref 0–13.4)
MONOCYTES NFR BLD AUTO: 8.6 % (ref 0–13.4)
MONOCYTES NFR BLD AUTO: 8.8 % (ref 0–13.4)
MONOCYTES NFR BLD AUTO: 8.9 % (ref 0–13.4)
MONOCYTES NFR BLD AUTO: 9.1 % (ref 0–13.4)
MORPHOLOGY BLD-IMP: NORMAL
MORPHOLOGY BLD-IMP: NORMAL
MRSA DNA SPEC QL NAA+PROBE: ABNORMAL
NEUTROPHILS # BLD AUTO: 10.61 K/UL (ref 2–7.15)
NEUTROPHILS # BLD AUTO: 10.96 K/UL (ref 2–7.15)
NEUTROPHILS # BLD AUTO: 15.39 K/UL (ref 2–7.15)
NEUTROPHILS # BLD AUTO: 15.79 K/UL (ref 2–7.15)
NEUTROPHILS # BLD AUTO: 16.43 K/UL (ref 2–7.15)
NEUTROPHILS # BLD AUTO: 18.18 K/UL (ref 2–7.15)
NEUTROPHILS # BLD AUTO: 18.48 K/UL (ref 2–7.15)
NEUTROPHILS # BLD AUTO: 6.55 K/UL (ref 2–7.15)
NEUTROPHILS # BLD AUTO: 7.22 K/UL (ref 2–7.15)
NEUTROPHILS # BLD AUTO: 8.37 K/UL (ref 2–7.15)
NEUTROPHILS # BLD AUTO: 8.63 K/UL (ref 2–7.15)
NEUTROPHILS # BLD AUTO: 8.81 K/UL (ref 2–7.15)
NEUTROPHILS # BLD AUTO: 9.39 K/UL (ref 2–7.15)
NEUTROPHILS # BLD AUTO: 9.71 K/UL (ref 2–7.15)
NEUTROPHILS NFR BLD: 65.4 % (ref 44–72)
NEUTROPHILS NFR BLD: 68.6 % (ref 44–72)
NEUTROPHILS NFR BLD: 69 % (ref 44–72)
NEUTROPHILS NFR BLD: 69.3 % (ref 44–72)
NEUTROPHILS NFR BLD: 70.1 % (ref 44–72)
NEUTROPHILS NFR BLD: 71 % (ref 44–72)
NEUTROPHILS NFR BLD: 76.5 % (ref 44–72)
NEUTROPHILS NFR BLD: 77.2 % (ref 44–72)
NEUTROPHILS NFR BLD: 79.7 % (ref 44–72)
NEUTROPHILS NFR BLD: 80.3 % (ref 44–72)
NEUTROPHILS NFR BLD: 85.3 % (ref 44–72)
NEUTROPHILS NFR BLD: 86.1 % (ref 44–72)
NEUTROPHILS NFR BLD: 90.7 % (ref 44–72)
NEUTROPHILS NFR BLD: 92.6 % (ref 44–72)
NITRITE UR QL STRIP.AUTO: NEGATIVE
NRBC # BLD AUTO: 0 K/UL
NRBC # BLD AUTO: 0.02 K/UL
NRBC # BLD AUTO: 0.03 K/UL
NRBC # BLD AUTO: 0.04 K/UL
NRBC # BLD AUTO: 0.05 K/UL
NRBC BLD-RTO: 0 /100 WBC
NRBC BLD-RTO: 0.2 /100 WBC
NRBC BLD-RTO: 0.2 /100 WBC
NRBC BLD-RTO: 0.4 /100 WBC
NRBC BLD-RTO: 0.4 /100 WBC
NT-PROBNP SERPL IA-MCNC: 4631 PG/ML (ref 0–125)
NT-PROBNP SERPL IA-MCNC: 6756 PG/ML (ref 0–125)
O2/TOTAL GAS SETTING VFR VENT: 100 %
O2/TOTAL GAS SETTING VFR VENT: 30 %
O2/TOTAL GAS SETTING VFR VENT: 40 %
O2/TOTAL GAS SETTING VFR VENT: 50 %
O2/TOTAL GAS SETTING VFR VENT: 60 %
OVALOCYTES BLD QL SMEAR: NORMAL
PCO2 BLDA: 34.9 MMHG (ref 26–37)
PCO2 BLDA: 36.4 MMHG (ref 26–37)
PCO2 BLDA: 37.8 MMHG (ref 26–37)
PCO2 BLDA: 40.1 MMHG (ref 26–37)
PCO2 BLDA: 40.8 MMHG (ref 26–37)
PCO2 BLDA: 44.5 MMHG (ref 26–37)
PCO2 BLDA: 48 MMHG (ref 26–37)
PCO2 BLDA: 48.2 MMHG (ref 26–37)
PCO2 BLDA: 49.2 MMHG (ref 26–37)
PCO2 BLDA: 52 MMHG (ref 26–37)
PCO2 BLDA: 52.3 MMHG (ref 26–37)
PCO2 BLDA: 53.7 MMHG (ref 26–37)
PCO2 BLDA: 58.6 MMHG (ref 26–37)
PCO2 BLDA: 61.6 MMHG (ref 26–37)
PCO2 BLDA: 63.4 MMHG (ref 26–37)
PCO2 BLDA: 66.1 MMHG (ref 26–37)
PCO2 BLDA: 70.8 MMHG (ref 26–37)
PCO2 BLDA: 71 MMHG (ref 26–37)
PCO2 BLDA: 82.5 MMHG (ref 26–37)
PCO2 BLDA: 82.6 MMHG (ref 26–37)
PCO2 BLDA: >100 MMHG (ref 26–37)
PCO2 BLDA: >100 MMHG (ref 26–37)
PCO2 TEMP ADJ BLDA: 35.5 MMHG (ref 26–37)
PCO2 TEMP ADJ BLDA: 35.7 MMHG (ref 26–37)
PCO2 TEMP ADJ BLDA: 39.3 MMHG (ref 26–37)
PCO2 TEMP ADJ BLDA: 40.9 MMHG (ref 26–37)
PCO2 TEMP ADJ BLDA: 41.5 MMHG (ref 26–37)
PCO2 TEMP ADJ BLDA: 44.7 MMHG (ref 26–37)
PCO2 TEMP ADJ BLDA: 48.4 MMHG (ref 26–37)
PCO2 TEMP ADJ BLDA: 50.1 MMHG (ref 26–37)
PCO2 TEMP ADJ BLDA: 50.1 MMHG (ref 26–37)
PCO2 TEMP ADJ BLDA: 52.2 MMHG (ref 26–37)
PCO2 TEMP ADJ BLDA: 53.5 MMHG (ref 26–37)
PCO2 TEMP ADJ BLDA: 53.5 MMHG (ref 26–37)
PCO2 TEMP ADJ BLDA: 58.9 MMHG (ref 26–37)
PCO2 TEMP ADJ BLDA: 62.5 MMHG (ref 26–37)
PCO2 TEMP ADJ BLDA: 62.7 MMHG (ref 26–37)
PCO2 TEMP ADJ BLDA: 64 MMHG (ref 26–37)
PCO2 TEMP ADJ BLDA: 70 MMHG (ref 26–37)
PCO2 TEMP ADJ BLDA: 70.5 MMHG (ref 26–37)
PCO2 TEMP ADJ BLDA: 81.5 MMHG (ref 26–37)
PCO2 TEMP ADJ BLDA: 84.5 MMHG (ref 26–37)
PCO2 TEMP ADJ BLDA: ABNORMAL MMHG (ref 26–37)
PCO2 TEMP ADJ BLDA: ABNORMAL MMHG (ref 26–37)
PH BLDA: 7.16 [PH] (ref 7.4–7.5)
PH BLDA: 7.16 [PH] (ref 7.4–7.5)
PH BLDA: 7.22 [PH] (ref 7.4–7.5)
PH BLDA: 7.27 [PH] (ref 7.4–7.5)
PH BLDA: 7.27 [PH] (ref 7.4–7.5)
PH BLDA: 7.29 [PH] (ref 7.4–7.5)
PH BLDA: 7.29 [PH] (ref 7.4–7.5)
PH BLDA: 7.31 [PH] (ref 7.4–7.5)
PH BLDA: 7.33 [PH] (ref 7.4–7.5)
PH BLDA: 7.35 [PH] (ref 7.4–7.5)
PH BLDA: 7.41 [PH] (ref 7.4–7.5)
PH BLDA: 7.42 [PH] (ref 7.4–7.5)
PH BLDA: 7.43 [PH] (ref 7.4–7.5)
PH BLDA: 7.44 [PH] (ref 7.4–7.5)
PH BLDA: 7.45 [PH] (ref 7.4–7.5)
PH BLDA: 7.49 [PH] (ref 7.4–7.5)
PH BLDA: 7.5 [PH] (ref 7.4–7.5)
PH BLDA: 7.51 [PH] (ref 7.4–7.5)
PH BLDA: 7.52 [PH] (ref 7.4–7.5)
PH BLDA: 7.54 [PH] (ref 7.4–7.5)
PH BLDA: 7.55 [PH] (ref 7.4–7.5)
PH BLDA: 7.57 [PH] (ref 7.4–7.5)
PH TEMP ADJ BLDA: 7.18 [PH] (ref 7.4–7.5)
PH TEMP ADJ BLDA: 7.18 [PH] (ref 7.4–7.5)
PH TEMP ADJ BLDA: 7.22 [PH] (ref 7.4–7.5)
PH TEMP ADJ BLDA: 7.26 [PH] (ref 7.4–7.5)
PH TEMP ADJ BLDA: 7.27 [PH] (ref 7.4–7.5)
PH TEMP ADJ BLDA: 7.29 [PH] (ref 7.4–7.5)
PH TEMP ADJ BLDA: 7.29 [PH] (ref 7.4–7.5)
PH TEMP ADJ BLDA: 7.31 [PH] (ref 7.4–7.5)
PH TEMP ADJ BLDA: 7.33 [PH] (ref 7.4–7.5)
PH TEMP ADJ BLDA: 7.37 [PH] (ref 7.4–7.5)
PH TEMP ADJ BLDA: 7.42 [PH] (ref 7.4–7.5)
PH TEMP ADJ BLDA: 7.43 [PH] (ref 7.4–7.5)
PH TEMP ADJ BLDA: 7.44 [PH] (ref 7.4–7.5)
PH TEMP ADJ BLDA: 7.49 [PH] (ref 7.4–7.5)
PH TEMP ADJ BLDA: 7.49 [PH] (ref 7.4–7.5)
PH TEMP ADJ BLDA: 7.5 [PH] (ref 7.4–7.5)
PH TEMP ADJ BLDA: 7.51 [PH] (ref 7.4–7.5)
PH TEMP ADJ BLDA: 7.55 [PH] (ref 7.4–7.5)
PH TEMP ADJ BLDA: 7.55 [PH] (ref 7.4–7.5)
PH TEMP ADJ BLDA: 7.56 [PH] (ref 7.4–7.5)
PH UR STRIP.AUTO: 5.5 [PH] (ref 5–8)
PHOSPHATE SERPL-MCNC: 1.9 MG/DL (ref 2.5–4.5)
PHOSPHATE SERPL-MCNC: 2.4 MG/DL (ref 2.5–4.5)
PHOSPHATE SERPL-MCNC: 2.6 MG/DL (ref 2.5–4.5)
PHOSPHATE SERPL-MCNC: 3.6 MG/DL (ref 2.5–4.5)
PHOSPHATE SERPL-MCNC: 3.9 MG/DL (ref 2.5–4.5)
PHOSPHATE SERPL-MCNC: 7.6 MG/DL (ref 2.5–4.5)
PLATELET # BLD AUTO: 115 K/UL (ref 164–446)
PLATELET # BLD AUTO: 124 K/UL (ref 164–446)
PLATELET # BLD AUTO: 127 K/UL (ref 164–446)
PLATELET # BLD AUTO: 128 K/UL (ref 164–446)
PLATELET # BLD AUTO: 129 K/UL (ref 164–446)
PLATELET # BLD AUTO: 130 K/UL (ref 164–446)
PLATELET # BLD AUTO: 132 K/UL (ref 164–446)
PLATELET # BLD AUTO: 143 K/UL (ref 164–446)
PLATELET # BLD AUTO: 144 K/UL (ref 164–446)
PLATELET # BLD AUTO: 182 K/UL (ref 164–446)
PLATELET # BLD AUTO: 204 K/UL (ref 164–446)
PLATELET # BLD AUTO: 218 K/UL (ref 164–446)
PLATELET # BLD AUTO: 219 K/UL (ref 164–446)
PLATELET # BLD AUTO: 221 K/UL (ref 164–446)
PLATELET # BLD AUTO: 248 K/UL (ref 164–446)
PLATELET BLD QL SMEAR: NORMAL
PLATELET BLD QL SMEAR: NORMAL
PMV BLD AUTO: 10.1 FL (ref 9–12.9)
PMV BLD AUTO: 10.2 FL (ref 9–12.9)
PMV BLD AUTO: 10.5 FL (ref 9–12.9)
PMV BLD AUTO: 10.7 FL (ref 9–12.9)
PMV BLD AUTO: 10.9 FL (ref 9–12.9)
PMV BLD AUTO: 10.9 FL (ref 9–12.9)
PMV BLD AUTO: 11.1 FL (ref 9–12.9)
PMV BLD AUTO: 11.1 FL (ref 9–12.9)
PMV BLD AUTO: 11.2 FL (ref 9–12.9)
PMV BLD AUTO: 11.5 FL (ref 9–12.9)
PMV BLD AUTO: 11.6 FL (ref 9–12.9)
PMV BLD AUTO: 11.7 FL (ref 9–12.9)
PMV BLD AUTO: 11.8 FL (ref 9–12.9)
PMV BLD AUTO: 11.9 FL (ref 9–12.9)
PMV BLD AUTO: 12.4 FL (ref 9–12.9)
PO2 BLDA: 118 MMHG (ref 64–87)
PO2 BLDA: 126 MMHG (ref 64–87)
PO2 BLDA: 131 MMHG (ref 64–87)
PO2 BLDA: 140 MMHG (ref 64–87)
PO2 BLDA: 284 MMHG (ref 64–87)
PO2 BLDA: 337 MMHG (ref 64–87)
PO2 BLDA: 58 MMHG (ref 64–87)
PO2 BLDA: 59 MMHG (ref 64–87)
PO2 BLDA: 64 MMHG (ref 64–87)
PO2 BLDA: 69 MMHG (ref 64–87)
PO2 BLDA: 72 MMHG (ref 64–87)
PO2 BLDA: 75 MMHG (ref 64–87)
PO2 BLDA: 76 MMHG (ref 64–87)
PO2 BLDA: 78 MMHG (ref 64–87)
PO2 BLDA: 78 MMHG (ref 64–87)
PO2 BLDA: 80 MMHG (ref 64–87)
PO2 BLDA: 83 MMHG (ref 64–87)
PO2 BLDA: 87 MMHG (ref 64–87)
PO2 BLDA: 90 MMHG (ref 64–87)
PO2 BLDA: 98 MMHG (ref 64–87)
PO2 BLDA: 99 MMHG (ref 64–87)
PO2 BLDA: 99 MMHG (ref 64–87)
PO2 TEMP ADJ BLDA: 101 MMHG (ref 64–87)
PO2 TEMP ADJ BLDA: 101 MMHG (ref 64–87)
PO2 TEMP ADJ BLDA: 124 MMHG (ref 64–87)
PO2 TEMP ADJ BLDA: 128 MMHG (ref 64–87)
PO2 TEMP ADJ BLDA: 129 MMHG (ref 64–87)
PO2 TEMP ADJ BLDA: 138 MMHG (ref 64–87)
PO2 TEMP ADJ BLDA: 286 MMHG (ref 64–87)
PO2 TEMP ADJ BLDA: 331 MMHG (ref 64–87)
PO2 TEMP ADJ BLDA: 55 MMHG (ref 64–87)
PO2 TEMP ADJ BLDA: 60 MMHG (ref 64–87)
PO2 TEMP ADJ BLDA: 64 MMHG (ref 64–87)
PO2 TEMP ADJ BLDA: 73 MMHG (ref 64–87)
PO2 TEMP ADJ BLDA: 73 MMHG (ref 64–87)
PO2 TEMP ADJ BLDA: 74 MMHG (ref 64–87)
PO2 TEMP ADJ BLDA: 76 MMHG (ref 64–87)
PO2 TEMP ADJ BLDA: 80 MMHG (ref 64–87)
PO2 TEMP ADJ BLDA: 80 MMHG (ref 64–87)
PO2 TEMP ADJ BLDA: 81 MMHG (ref 64–87)
PO2 TEMP ADJ BLDA: 84 MMHG (ref 64–87)
PO2 TEMP ADJ BLDA: 87 MMHG (ref 64–87)
PO2 TEMP ADJ BLDA: 91 MMHG (ref 64–87)
PO2 TEMP ADJ BLDA: 93 MMHG (ref 64–87)
POIKILOCYTOSIS BLD QL SMEAR: NORMAL
POLYCHROMASIA BLD QL SMEAR: NORMAL
POTASSIUM BLD-SCNC: 4.4 MMOL/L (ref 3.6–5.5)
POTASSIUM SERPL-SCNC: 3.3 MMOL/L (ref 3.6–5.5)
POTASSIUM SERPL-SCNC: 3.4 MMOL/L (ref 3.6–5.5)
POTASSIUM SERPL-SCNC: 3.8 MMOL/L (ref 3.6–5.5)
POTASSIUM SERPL-SCNC: 3.8 MMOL/L (ref 3.6–5.5)
POTASSIUM SERPL-SCNC: 3.9 MMOL/L (ref 3.6–5.5)
POTASSIUM SERPL-SCNC: 4 MMOL/L (ref 3.6–5.5)
POTASSIUM SERPL-SCNC: 4 MMOL/L (ref 3.6–5.5)
POTASSIUM SERPL-SCNC: 4.1 MMOL/L (ref 3.6–5.5)
POTASSIUM SERPL-SCNC: 4.1 MMOL/L (ref 3.6–5.5)
POTASSIUM SERPL-SCNC: 4.2 MMOL/L (ref 3.6–5.5)
POTASSIUM SERPL-SCNC: 4.4 MMOL/L (ref 3.6–5.5)
POTASSIUM SERPL-SCNC: 5.1 MMOL/L (ref 3.6–5.5)
POTASSIUM UR-SCNC: 26 MMOL/L
PREALB SERPL-MCNC: 11.4 MG/DL (ref 18–38)
PREALB SERPL-MCNC: 32.1 MG/DL (ref 18–38)
PREALB SERPL-MCNC: 9.4 MG/DL (ref 18–38)
PROCALCITONIN SERPL-MCNC: 0.25 NG/ML
PROT SERPL-MCNC: 5.3 G/DL (ref 6–8.2)
PROT SERPL-MCNC: 5.3 G/DL (ref 6–8.2)
PROT SERPL-MCNC: 5.5 G/DL (ref 6–8.2)
PROT SERPL-MCNC: 6.5 G/DL (ref 6–8.2)
PROT SERPL-MCNC: 6.8 G/DL (ref 6–8.2)
PROT UR QL STRIP: NEGATIVE MG/DL
PROT UR-MCNC: 7 MG/DL (ref 0–15)
PROTHROMBIN TIME: 15.6 SEC (ref 12–14.6)
RBC # BLD AUTO: 4.7 M/UL (ref 4.2–5.4)
RBC # BLD AUTO: 4.89 M/UL (ref 4.2–5.4)
RBC # BLD AUTO: 4.9 M/UL (ref 4.2–5.4)
RBC # BLD AUTO: 4.94 M/UL (ref 4.2–5.4)
RBC # BLD AUTO: 4.94 M/UL (ref 4.2–5.4)
RBC # BLD AUTO: 5 M/UL (ref 4.2–5.4)
RBC # BLD AUTO: 5.01 M/UL (ref 4.2–5.4)
RBC # BLD AUTO: 5.01 M/UL (ref 4.2–5.4)
RBC # BLD AUTO: 5.05 M/UL (ref 4.2–5.4)
RBC # BLD AUTO: 5.05 M/UL (ref 4.2–5.4)
RBC # BLD AUTO: 5.08 M/UL (ref 4.2–5.4)
RBC # BLD AUTO: 5.09 M/UL (ref 4.2–5.4)
RBC # BLD AUTO: 5.14 M/UL (ref 4.2–5.4)
RBC # BLD AUTO: 5.23 M/UL (ref 4.2–5.4)
RBC # BLD AUTO: 5.71 M/UL (ref 4.2–5.4)
RBC BLD AUTO: PRESENT
RBC BLD AUTO: PRESENT
RBC UR QL AUTO: NEGATIVE
SAO2 % BLDA: 100 % (ref 93–99)
SAO2 % BLDA: 100 % (ref 93–99)
SAO2 % BLDA: 91 % (ref 93–99)
SAO2 % BLDA: 91 % (ref 93–99)
SAO2 % BLDA: 92 % (ref 93–99)
SAO2 % BLDA: 92 % (ref 93–99)
SAO2 % BLDA: 94 % (ref 93–99)
SAO2 % BLDA: 95 % (ref 93–99)
SAO2 % BLDA: 96 % (ref 93–99)
SAO2 % BLDA: 97 % (ref 93–99)
SAO2 % BLDA: 98 % (ref 93–99)
SAO2 % BLDA: 99 % (ref 93–99)
SAO2 % BLDA: ABNORMAL % (ref 93–99)
SAO2 % BLDA: ABNORMAL % (ref 93–99)
SARS-COV-2 RNA RESP QL NAA+PROBE: NOTDETECTED
SIGNIFICANT IND 70042: ABNORMAL
SIGNIFICANT IND 70042: NORMAL
SITE SITE: ABNORMAL
SITE SITE: NORMAL
SODIUM BLD-SCNC: 134 MMOL/L (ref 135–145)
SODIUM SERPL-SCNC: 133 MMOL/L (ref 135–145)
SODIUM SERPL-SCNC: 136 MMOL/L (ref 135–145)
SODIUM SERPL-SCNC: 137 MMOL/L (ref 135–145)
SODIUM SERPL-SCNC: 138 MMOL/L (ref 135–145)
SODIUM SERPL-SCNC: 139 MMOL/L (ref 135–145)
SODIUM SERPL-SCNC: 140 MMOL/L (ref 135–145)
SODIUM SERPL-SCNC: 140 MMOL/L (ref 135–145)
SODIUM SERPL-SCNC: 141 MMOL/L (ref 135–145)
SODIUM SERPL-SCNC: 142 MMOL/L (ref 135–145)
SODIUM UR-SCNC: 80 MMOL/L
SOURCE SOURCE: ABNORMAL
SOURCE SOURCE: NORMAL
SP GR UR STRIP.AUTO: 1.01
SPECIMEN DRAWN FROM PATIENT: ABNORMAL
SPECIMEN SOURCE: NORMAL
TRIGL SERPL-MCNC: 101 MG/DL (ref 0–149)
TRIGL SERPL-MCNC: 99 MG/DL (ref 0–149)
TROPONIN T SERPL-MCNC: 68 NG/L (ref 6–19)
UROBILINOGEN UR STRIP.AUTO-MCNC: 0.2 MG/DL
WBC # BLD AUTO: 10 K/UL (ref 4.8–10.8)
WBC # BLD AUTO: 10.3 K/UL (ref 4.8–10.8)
WBC # BLD AUTO: 10.9 K/UL (ref 4.8–10.8)
WBC # BLD AUTO: 11.4 K/UL (ref 4.8–10.8)
WBC # BLD AUTO: 12.5 K/UL (ref 4.8–10.8)
WBC # BLD AUTO: 13.3 K/UL (ref 4.8–10.8)
WBC # BLD AUTO: 13.7 K/UL (ref 4.8–10.8)
WBC # BLD AUTO: 13.7 K/UL (ref 4.8–10.8)
WBC # BLD AUTO: 15.9 K/UL (ref 4.8–10.8)
WBC # BLD AUTO: 16.6 K/UL (ref 4.8–10.8)
WBC # BLD AUTO: 19.1 K/UL (ref 4.8–10.8)
WBC # BLD AUTO: 19.7 K/UL (ref 4.8–10.8)
WBC # BLD AUTO: 20.4 K/UL (ref 4.8–10.8)
WBC # BLD AUTO: 21.3 K/UL (ref 4.8–10.8)
WBC # BLD AUTO: 21.3 K/UL (ref 4.8–10.8)

## 2020-01-01 PROCEDURE — 84134 ASSAY OF PREALBUMIN: CPT

## 2020-01-01 PROCEDURE — 700111 HCHG RX REV CODE 636 W/ 250 OVERRIDE (IP): Performed by: INTERNAL MEDICINE

## 2020-01-01 PROCEDURE — 82803 BLOOD GASES ANY COMBINATION: CPT

## 2020-01-01 PROCEDURE — 82570 ASSAY OF URINE CREATININE: CPT

## 2020-01-01 PROCEDURE — 94003 VENT MGMT INPAT SUBQ DAY: CPT

## 2020-01-01 PROCEDURE — 700101 HCHG RX REV CODE 250: Performed by: INTERNAL MEDICINE

## 2020-01-01 PROCEDURE — 700102 HCHG RX REV CODE 250 W/ 637 OVERRIDE(OP): Performed by: INTERNAL MEDICINE

## 2020-01-01 PROCEDURE — 84145 PROCALCITONIN (PCT): CPT

## 2020-01-01 PROCEDURE — 94640 AIRWAY INHALATION TREATMENT: CPT

## 2020-01-01 PROCEDURE — A9270 NON-COVERED ITEM OR SERVICE: HCPCS | Performed by: INTERNAL MEDICINE

## 2020-01-01 PROCEDURE — 85025 COMPLETE CBC W/AUTO DIFF WBC: CPT

## 2020-01-01 PROCEDURE — 71045 X-RAY EXAM CHEST 1 VIEW: CPT

## 2020-01-01 PROCEDURE — 84100 ASSAY OF PHOSPHORUS: CPT

## 2020-01-01 PROCEDURE — 700105 HCHG RX REV CODE 258: Performed by: INTERNAL MEDICINE

## 2020-01-01 PROCEDURE — 80053 COMPREHEN METABOLIC PANEL: CPT

## 2020-01-01 PROCEDURE — 92960 CARDIOVERSION ELECTRIC EXT: CPT

## 2020-01-01 PROCEDURE — 700101 HCHG RX REV CODE 250

## 2020-01-01 PROCEDURE — 99232 SBSQ HOSP IP/OBS MODERATE 35: CPT | Performed by: HOSPITALIST

## 2020-01-01 PROCEDURE — 99291 CRITICAL CARE FIRST HOUR: CPT | Performed by: INTERNAL MEDICINE

## 2020-01-01 PROCEDURE — C1751 CATH, INF, PER/CENT/MIDLINE: HCPCS

## 2020-01-01 PROCEDURE — 82803 BLOOD GASES ANY COMBINATION: CPT | Mod: 91

## 2020-01-01 PROCEDURE — 37799 UNLISTED PX VASCULAR SURGERY: CPT

## 2020-01-01 PROCEDURE — 82436 ASSAY OF URINE CHLORIDE: CPT

## 2020-01-01 PROCEDURE — 99232 SBSQ HOSP IP/OBS MODERATE 35: CPT | Performed by: INTERNAL MEDICINE

## 2020-01-01 PROCEDURE — 83735 ASSAY OF MAGNESIUM: CPT

## 2020-01-01 PROCEDURE — 94660 CPAP INITIATION&MGMT: CPT

## 2020-01-01 PROCEDURE — 80048 BASIC METABOLIC PNL TOTAL CA: CPT

## 2020-01-01 PROCEDURE — 84133 ASSAY OF URINE POTASSIUM: CPT

## 2020-01-01 PROCEDURE — 770022 HCHG ROOM/CARE - ICU (200)

## 2020-01-01 PROCEDURE — 99221 1ST HOSP IP/OBS SF/LOW 40: CPT | Mod: 25 | Performed by: INTERNAL MEDICINE

## 2020-01-01 PROCEDURE — 84132 ASSAY OF SERUM POTASSIUM: CPT

## 2020-01-01 PROCEDURE — 99292 CRITICAL CARE ADDL 30 MIN: CPT | Performed by: INTERNAL MEDICINE

## 2020-01-01 PROCEDURE — 85027 COMPLETE CBC AUTOMATED: CPT

## 2020-01-01 PROCEDURE — U0004 COV-19 TEST NON-CDC HGH THRU: HCPCS

## 2020-01-01 PROCEDURE — 94150 VITAL CAPACITY TEST: CPT

## 2020-01-01 PROCEDURE — 94669 MECHANICAL CHEST WALL OSCILL: CPT

## 2020-01-01 PROCEDURE — 99233 SBSQ HOSP IP/OBS HIGH 50: CPT | Performed by: HOSPITALIST

## 2020-01-01 PROCEDURE — 84295 ASSAY OF SERUM SODIUM: CPT

## 2020-01-01 PROCEDURE — 93005 ELECTROCARDIOGRAM TRACING: CPT | Performed by: INTERNAL MEDICINE

## 2020-01-01 PROCEDURE — 86140 C-REACTIVE PROTEIN: CPT

## 2020-01-01 PROCEDURE — 99291 CRITICAL CARE FIRST HOUR: CPT | Mod: 25 | Performed by: INTERNAL MEDICINE

## 2020-01-01 PROCEDURE — 85730 THROMBOPLASTIN TIME PARTIAL: CPT

## 2020-01-01 PROCEDURE — 87641 MR-STAPH DNA AMP PROBE: CPT

## 2020-01-01 PROCEDURE — 97535 SELF CARE MNGMENT TRAINING: CPT

## 2020-01-01 PROCEDURE — 36556 INSERT NON-TUNNEL CV CATH: CPT

## 2020-01-01 PROCEDURE — 700111 HCHG RX REV CODE 636 W/ 250 OVERRIDE (IP)

## 2020-01-01 PROCEDURE — 99232 SBSQ HOSP IP/OBS MODERATE 35: CPT | Mod: 25 | Performed by: INTERNAL MEDICINE

## 2020-01-01 PROCEDURE — 74018 RADEX ABDOMEN 1 VIEW: CPT

## 2020-01-01 PROCEDURE — 83880 ASSAY OF NATRIURETIC PEPTIDE: CPT

## 2020-01-01 PROCEDURE — 93005 ELECTROCARDIOGRAM TRACING: CPT | Performed by: EMERGENCY MEDICINE

## 2020-01-01 PROCEDURE — 92960 CARDIOVERSION ELECTRIC EXT: CPT | Performed by: INTERNAL MEDICINE

## 2020-01-01 PROCEDURE — B548ZZA ULTRASONOGRAPHY OF SUPERIOR VENA CAVA, GUIDANCE: ICD-10-PCS | Performed by: INTERNAL MEDICINE

## 2020-01-01 PROCEDURE — A9270 NON-COVERED ITEM OR SERVICE: HCPCS

## 2020-01-01 PROCEDURE — 93010 ELECTROCARDIOGRAM REPORT: CPT | Performed by: INTERNAL MEDICINE

## 2020-01-01 PROCEDURE — 5A2204Z RESTORATION OF CARDIAC RHYTHM, SINGLE: ICD-10-PCS | Performed by: INTERNAL MEDICINE

## 2020-01-01 PROCEDURE — 36556 INSERT NON-TUNNEL CV CATH: CPT | Mod: RT | Performed by: INTERNAL MEDICINE

## 2020-01-01 PROCEDURE — 93306 TTE W/DOPPLER COMPLETE: CPT | Mod: 26 | Performed by: INTERNAL MEDICINE

## 2020-01-01 PROCEDURE — 82140 ASSAY OF AMMONIA: CPT

## 2020-01-01 PROCEDURE — G2023 SPECIMEN COLLECT COVID-19: HCPCS | Performed by: HOSPITALIST

## 2020-01-01 PROCEDURE — 700111 HCHG RX REV CODE 636 W/ 250 OVERRIDE (IP): Performed by: STUDENT IN AN ORGANIZED HEALTH CARE EDUCATION/TRAINING PROGRAM

## 2020-01-01 PROCEDURE — 85610 PROTHROMBIN TIME: CPT

## 2020-01-01 PROCEDURE — 85379 FIBRIN DEGRADATION QUANT: CPT

## 2020-01-01 PROCEDURE — 97530 THERAPEUTIC ACTIVITIES: CPT

## 2020-01-01 PROCEDURE — 31500 INSERT EMERGENCY AIRWAY: CPT

## 2020-01-01 PROCEDURE — 700101 HCHG RX REV CODE 250: Performed by: PSYCHIATRY & NEUROLOGY

## 2020-01-01 PROCEDURE — 700111 HCHG RX REV CODE 636 W/ 250 OVERRIDE (IP): Performed by: HOSPITALIST

## 2020-01-01 PROCEDURE — 02HV33Z INSERTION OF INFUSION DEVICE INTO SUPERIOR VENA CAVA, PERCUTANEOUS APPROACH: ICD-10-PCS | Performed by: INTERNAL MEDICINE

## 2020-01-01 PROCEDURE — 700105 HCHG RX REV CODE 258: Performed by: PSYCHIATRY & NEUROLOGY

## 2020-01-01 PROCEDURE — 93306 TTE W/DOPPLER COMPLETE: CPT

## 2020-01-01 PROCEDURE — 770001 HCHG ROOM/CARE - MED/SURG/GYN PRIV*

## 2020-01-01 PROCEDURE — 87040 BLOOD CULTURE FOR BACTERIA: CPT

## 2020-01-01 PROCEDURE — 71275 CT ANGIOGRAPHY CHEST: CPT

## 2020-01-01 PROCEDURE — 84156 ASSAY OF PROTEIN URINE: CPT

## 2020-01-01 PROCEDURE — 83605 ASSAY OF LACTIC ACID: CPT

## 2020-01-01 PROCEDURE — 31500 INSERT EMERGENCY AIRWAY: CPT | Performed by: INTERNAL MEDICINE

## 2020-01-01 PROCEDURE — 84478 ASSAY OF TRIGLYCERIDES: CPT

## 2020-01-01 PROCEDURE — 87493 C DIFF AMPLIFIED PROBE: CPT

## 2020-01-01 PROCEDURE — 70490 CT SOFT TISSUE NECK W/O DYE: CPT

## 2020-01-01 PROCEDURE — 0BH17EZ INSERTION OF ENDOTRACHEAL AIRWAY INTO TRACHEA, VIA NATURAL OR ARTIFICIAL OPENING: ICD-10-PCS | Performed by: INTERNAL MEDICINE

## 2020-01-01 PROCEDURE — 03HY32Z INSERTION OF MONITORING DEVICE INTO UPPER ARTERY, PERCUTANEOUS APPROACH: ICD-10-PCS | Performed by: INTERNAL MEDICINE

## 2020-01-01 PROCEDURE — 93970 EXTREMITY STUDY: CPT | Mod: 26 | Performed by: INTERNAL MEDICINE

## 2020-01-01 PROCEDURE — 99292 CRITICAL CARE ADDL 30 MIN: CPT | Mod: 25 | Performed by: INTERNAL MEDICINE

## 2020-01-01 PROCEDURE — G2023 SPECIMEN COLLECT COVID-19: HCPCS | Performed by: INTERNAL MEDICINE

## 2020-01-01 PROCEDURE — 0BH18EZ INSERTION OF ENDOTRACHEAL AIRWAY INTO TRACHEA, VIA NATURAL OR ARTIFICIAL OPENING ENDOSCOPIC: ICD-10-PCS | Performed by: INTERNAL MEDICINE

## 2020-01-01 PROCEDURE — 302146: Performed by: INTERNAL MEDICINE

## 2020-01-01 PROCEDURE — 99223 1ST HOSP IP/OBS HIGH 75: CPT | Mod: AI | Performed by: HOSPITALIST

## 2020-01-01 PROCEDURE — 5A1955Z RESPIRATORY VENTILATION, GREATER THAN 96 CONSECUTIVE HOURS: ICD-10-PCS | Performed by: INTERNAL MEDICINE

## 2020-01-01 PROCEDURE — 87205 SMEAR GRAM STAIN: CPT

## 2020-01-01 PROCEDURE — 85014 HEMATOCRIT: CPT

## 2020-01-01 PROCEDURE — 0BP1XDZ REMOVAL OF INTRALUMINAL DEVICE FROM TRACHEA, EXTERNAL APPROACH: ICD-10-PCS | Performed by: INTERNAL MEDICINE

## 2020-01-01 PROCEDURE — 84300 ASSAY OF URINE SODIUM: CPT

## 2020-01-01 PROCEDURE — 700111 HCHG RX REV CODE 636 W/ 250 OVERRIDE (IP): Performed by: PSYCHIATRY & NEUROLOGY

## 2020-01-01 PROCEDURE — 99285 EMERGENCY DEPT VISIT HI MDM: CPT

## 2020-01-01 PROCEDURE — 82533 TOTAL CORTISOL: CPT

## 2020-01-01 PROCEDURE — 84484 ASSAY OF TROPONIN QUANT: CPT

## 2020-01-01 PROCEDURE — 97166 OT EVAL MOD COMPLEX 45 MIN: CPT

## 2020-01-01 PROCEDURE — 302214 HCHG STAT INTUBATION BOX: Performed by: INTERNAL MEDICINE

## 2020-01-01 PROCEDURE — 700102 HCHG RX REV CODE 250 W/ 637 OVERRIDE(OP)

## 2020-01-01 PROCEDURE — 302136 NUTRITION PUMP: Performed by: INTERNAL MEDICINE

## 2020-01-01 PROCEDURE — 700117 HCHG RX CONTRAST REV CODE 255: Performed by: INTERNAL MEDICINE

## 2020-01-01 PROCEDURE — 94002 VENT MGMT INPAT INIT DAY: CPT

## 2020-01-01 PROCEDURE — 82728 ASSAY OF FERRITIN: CPT

## 2020-01-01 PROCEDURE — 36620 INSERTION CATHETER ARTERY: CPT | Performed by: INTERNAL MEDICINE

## 2020-01-01 PROCEDURE — 82330 ASSAY OF CALCIUM: CPT

## 2020-01-01 PROCEDURE — 87070 CULTURE OTHR SPECIMN AEROBIC: CPT

## 2020-01-01 PROCEDURE — 97162 PT EVAL MOD COMPLEX 30 MIN: CPT

## 2020-01-01 PROCEDURE — 82962 GLUCOSE BLOOD TEST: CPT

## 2020-01-01 PROCEDURE — 93970 EXTREMITY STUDY: CPT

## 2020-01-01 PROCEDURE — 99358 PROLONG SERVICE W/O CONTACT: CPT | Performed by: HOSPITALIST

## 2020-01-01 PROCEDURE — 83615 LACTATE (LD) (LDH) ENZYME: CPT

## 2020-01-01 PROCEDURE — 81003 URINALYSIS AUTO W/O SCOPE: CPT

## 2020-01-01 RX ORDER — AMOXICILLIN 250 MG
2 CAPSULE ORAL 2 TIMES DAILY
Status: DISCONTINUED | OUTPATIENT
Start: 2020-01-01 | End: 2020-01-01

## 2020-01-01 RX ORDER — POLYETHYLENE GLYCOL 3350 17 G/17G
1 POWDER, FOR SOLUTION ORAL
Status: DISCONTINUED | OUTPATIENT
Start: 2020-01-01 | End: 2020-01-01

## 2020-01-01 RX ORDER — HYDROMORPHONE HYDROCHLORIDE 2 MG/ML
1.5 INJECTION, SOLUTION INTRAMUSCULAR; INTRAVENOUS; SUBCUTANEOUS
Status: DISCONTINUED | OUTPATIENT
Start: 2020-01-01 | End: 2020-01-01

## 2020-01-01 RX ORDER — BISACODYL 10 MG
10 SUPPOSITORY, RECTAL RECTAL
Status: DISCONTINUED | OUTPATIENT
Start: 2020-01-01 | End: 2020-01-01

## 2020-01-01 RX ORDER — FAMOTIDINE 20 MG/1
20 TABLET, FILM COATED ORAL EVERY 12 HOURS
Status: DISCONTINUED | OUTPATIENT
Start: 2020-01-01 | End: 2020-01-01

## 2020-01-01 RX ORDER — HEPARIN SODIUM 1000 [USP'U]/ML
6000 INJECTION, SOLUTION INTRAVENOUS; SUBCUTANEOUS ONCE
Status: COMPLETED | OUTPATIENT
Start: 2020-01-01 | End: 2020-01-01

## 2020-01-01 RX ORDER — PROPOFOL 10 MG/ML
30 INJECTION, EMULSION INTRAVENOUS ONCE
Status: COMPLETED | OUTPATIENT
Start: 2020-01-01 | End: 2020-01-01

## 2020-01-01 RX ORDER — ETOMIDATE 2 MG/ML
20 INJECTION INTRAVENOUS ONCE
Status: DISCONTINUED | OUTPATIENT
Start: 2020-01-01 | End: 2020-01-01

## 2020-01-01 RX ORDER — PHENYLEPHRINE HCL IN 0.9% NACL 0.5 MG/5ML
500 SYRINGE (ML) INTRAVENOUS ONCE
Status: COMPLETED | OUTPATIENT
Start: 2020-01-01 | End: 2020-01-01

## 2020-01-01 RX ORDER — POTASSIUM CHLORIDE 20 MEQ/1
40 TABLET, EXTENDED RELEASE ORAL 2 TIMES DAILY
Status: DISCONTINUED | OUTPATIENT
Start: 2020-01-01 | End: 2020-01-01

## 2020-01-01 RX ORDER — SODIUM CHLORIDE, SODIUM LACTATE, POTASSIUM CHLORIDE, CALCIUM CHLORIDE 600; 310; 30; 20 MG/100ML; MG/100ML; MG/100ML; MG/100ML
INJECTION, SOLUTION INTRAVENOUS
Status: ACTIVE
Start: 2020-01-01 | End: 2020-01-01

## 2020-01-01 RX ORDER — FAMOTIDINE 20 MG/1
20 TABLET, FILM COATED ORAL DAILY
Status: DISCONTINUED | OUTPATIENT
Start: 2020-01-01 | End: 2020-01-01

## 2020-01-01 RX ORDER — NOREPINEPHRINE BITARTRATE 0.03 MG/ML
0-30 INJECTION, SOLUTION INTRAVENOUS CONTINUOUS
Status: DISCONTINUED | OUTPATIENT
Start: 2020-01-01 | End: 2020-01-01

## 2020-01-01 RX ORDER — PHENYLEPHRINE HCL IN 0.9% NACL 0.5 MG/5ML
100 SYRINGE (ML) INTRAVENOUS
Status: ACTIVE | OUTPATIENT
Start: 2020-01-01 | End: 2020-01-01

## 2020-01-01 RX ORDER — OXYCODONE HYDROCHLORIDE 5 MG/1
5 TABLET ORAL EVERY 4 HOURS PRN
Status: DISCONTINUED | OUTPATIENT
Start: 2020-01-01 | End: 2020-01-01

## 2020-01-01 RX ORDER — SODIUM CHLORIDE, SODIUM LACTATE, POTASSIUM CHLORIDE, AND CALCIUM CHLORIDE .6; .31; .03; .02 G/100ML; G/100ML; G/100ML; G/100ML
500 INJECTION, SOLUTION INTRAVENOUS ONCE
Status: COMPLETED | OUTPATIENT
Start: 2020-01-01 | End: 2020-01-01

## 2020-01-01 RX ORDER — HYDRALAZINE HYDROCHLORIDE 20 MG/ML
20 INJECTION INTRAMUSCULAR; INTRAVENOUS EVERY 6 HOURS PRN
Status: DISCONTINUED | OUTPATIENT
Start: 2020-01-01 | End: 2020-01-01

## 2020-01-01 RX ORDER — MIDAZOLAM HYDROCHLORIDE 1 MG/ML
2 INJECTION INTRAMUSCULAR; INTRAVENOUS ONCE
Status: COMPLETED | OUTPATIENT
Start: 2020-01-01 | End: 2020-01-01

## 2020-01-01 RX ORDER — OXYCODONE HYDROCHLORIDE 10 MG/1
10 TABLET ORAL EVERY 4 HOURS PRN
Status: DISCONTINUED | OUTPATIENT
Start: 2020-01-01 | End: 2020-01-01

## 2020-01-01 RX ORDER — FUROSEMIDE 10 MG/ML
INJECTION INTRAMUSCULAR; INTRAVENOUS
Status: ACTIVE
Start: 2020-01-01 | End: 2020-01-01

## 2020-01-01 RX ORDER — ASPIRIN 81 MG/1
81 TABLET, CHEWABLE ORAL DAILY
Status: DISCONTINUED | OUTPATIENT
Start: 2020-01-01 | End: 2020-01-01

## 2020-01-01 RX ORDER — SODIUM CHLORIDE 9 MG/ML
INJECTION, SOLUTION INTRAVENOUS
Status: ACTIVE
Start: 2020-01-01 | End: 2020-01-01

## 2020-01-01 RX ORDER — MIDODRINE HYDROCHLORIDE 5 MG/1
5 TABLET ORAL
Status: DISCONTINUED | OUTPATIENT
Start: 2020-01-01 | End: 2020-01-01

## 2020-01-01 RX ORDER — ROCURONIUM BROMIDE 10 MG/ML
50 INJECTION, SOLUTION INTRAVENOUS ONCE
Status: DISCONTINUED | OUTPATIENT
Start: 2020-01-01 | End: 2020-01-01

## 2020-01-01 RX ORDER — HEPARIN SODIUM 5000 [USP'U]/100ML
INJECTION, SOLUTION INTRAVENOUS CONTINUOUS
Status: DISCONTINUED | OUTPATIENT
Start: 2020-01-01 | End: 2020-01-01

## 2020-01-01 RX ORDER — FUROSEMIDE 10 MG/ML
20 INJECTION INTRAMUSCULAR; INTRAVENOUS
Status: DISCONTINUED | OUTPATIENT
Start: 2020-01-01 | End: 2020-01-01

## 2020-01-01 RX ORDER — HYDROMORPHONE HYDROCHLORIDE 1 MG/ML
0.75 INJECTION, SOLUTION INTRAMUSCULAR; INTRAVENOUS; SUBCUTANEOUS
Status: DISCONTINUED | OUTPATIENT
Start: 2020-01-01 | End: 2020-01-01

## 2020-01-01 RX ORDER — ONDANSETRON 4 MG/1
4 TABLET, ORALLY DISINTEGRATING ORAL EVERY 4 HOURS PRN
Status: DISCONTINUED | OUTPATIENT
Start: 2020-01-01 | End: 2020-01-01

## 2020-01-01 RX ORDER — LORAZEPAM 2 MG/ML
1-2 INJECTION INTRAMUSCULAR
Status: DISCONTINUED | OUTPATIENT
Start: 2020-01-01 | End: 2020-05-16 | Stop reason: HOSPADM

## 2020-01-01 RX ORDER — ACETAMINOPHEN 325 MG/1
650 TABLET ORAL EVERY 6 HOURS PRN
Status: DISCONTINUED | OUTPATIENT
Start: 2020-01-01 | End: 2020-01-01

## 2020-01-01 RX ORDER — GUAIFENESIN 600 MG/1
600 TABLET, EXTENDED RELEASE ORAL EVERY 12 HOURS
Status: DISCONTINUED | OUTPATIENT
Start: 2020-01-01 | End: 2020-01-01

## 2020-01-01 RX ORDER — LOSARTAN POTASSIUM 25 MG/1
25 TABLET ORAL 2 TIMES DAILY
Status: DISCONTINUED | OUTPATIENT
Start: 2020-01-01 | End: 2020-01-01

## 2020-01-01 RX ORDER — POTASSIUM CHLORIDE 20 MEQ/1
20 TABLET, EXTENDED RELEASE ORAL 2 TIMES DAILY
Status: DISCONTINUED | OUTPATIENT
Start: 2020-01-01 | End: 2020-01-01

## 2020-01-01 RX ORDER — LOSARTAN POTASSIUM 50 MG/1
100 TABLET ORAL DAILY
Status: DISCONTINUED | OUTPATIENT
Start: 2020-01-01 | End: 2020-01-01

## 2020-01-01 RX ORDER — CYCLOBENZAPRINE HCL 10 MG
10 TABLET ORAL 3 TIMES DAILY PRN
Status: DISCONTINUED | OUTPATIENT
Start: 2020-01-01 | End: 2020-01-01

## 2020-01-01 RX ORDER — METOPROLOL TARTRATE 1 MG/ML
5 INJECTION, SOLUTION INTRAVENOUS
Status: DISCONTINUED | OUTPATIENT
Start: 2020-01-01 | End: 2020-01-01

## 2020-01-01 RX ORDER — IPRATROPIUM BROMIDE AND ALBUTEROL SULFATE 2.5; .5 MG/3ML; MG/3ML
3 SOLUTION RESPIRATORY (INHALATION)
Status: DISCONTINUED | OUTPATIENT
Start: 2020-01-01 | End: 2020-01-01

## 2020-01-01 RX ORDER — MORPHINE SULFATE 10 MG/ML
2-10 INJECTION, SOLUTION INTRAMUSCULAR; INTRAVENOUS
Status: DISCONTINUED | OUTPATIENT
Start: 2020-01-01 | End: 2020-05-16 | Stop reason: HOSPADM

## 2020-01-01 RX ORDER — ETOMIDATE 2 MG/ML
20 INJECTION INTRAVENOUS ONCE
Status: COMPLETED | OUTPATIENT
Start: 2020-01-01 | End: 2020-01-01

## 2020-01-01 RX ORDER — MAGNESIUM SULFATE HEPTAHYDRATE 40 MG/ML
2 INJECTION, SOLUTION INTRAVENOUS ONCE
Status: COMPLETED | OUTPATIENT
Start: 2020-01-01 | End: 2020-01-01

## 2020-01-01 RX ORDER — MIDAZOLAM HYDROCHLORIDE 1 MG/ML
INJECTION INTRAMUSCULAR; INTRAVENOUS
Status: COMPLETED
Start: 2020-01-01 | End: 2020-01-01

## 2020-01-01 RX ORDER — ROCURONIUM BROMIDE 10 MG/ML
50 INJECTION, SOLUTION INTRAVENOUS ONCE
Status: COMPLETED | OUTPATIENT
Start: 2020-01-01 | End: 2020-01-01

## 2020-01-01 RX ORDER — METHYLPREDNISOLONE SODIUM SUCCINATE 125 MG/2ML
62.5 INJECTION, POWDER, LYOPHILIZED, FOR SOLUTION INTRAMUSCULAR; INTRAVENOUS ONCE
Status: COMPLETED | OUTPATIENT
Start: 2020-01-01 | End: 2020-01-01

## 2020-01-01 RX ORDER — SODIUM CHLORIDE, SODIUM LACTATE, POTASSIUM CHLORIDE, CALCIUM CHLORIDE 600; 310; 30; 20 MG/100ML; MG/100ML; MG/100ML; MG/100ML
2000 INJECTION, SOLUTION INTRAVENOUS ONCE
Status: COMPLETED | OUTPATIENT
Start: 2020-01-01 | End: 2020-01-01

## 2020-01-01 RX ORDER — HEPARIN SODIUM 1000 [USP'U]/ML
3200 INJECTION, SOLUTION INTRAVENOUS; SUBCUTANEOUS PRN
Status: DISCONTINUED | OUTPATIENT
Start: 2020-01-01 | End: 2020-01-01

## 2020-01-01 RX ORDER — METHYLPREDNISOLONE SODIUM SUCCINATE 125 MG/2ML
125 INJECTION, POWDER, LYOPHILIZED, FOR SOLUTION INTRAMUSCULAR; INTRAVENOUS ONCE
Status: COMPLETED | OUTPATIENT
Start: 2020-01-01 | End: 2020-01-01

## 2020-01-01 RX ORDER — NOREPINEPHRINE BITARTRATE 1 MG/ML
INJECTION, SOLUTION INTRAVENOUS
Status: COMPLETED
Start: 2020-01-01 | End: 2020-01-01

## 2020-01-01 RX ORDER — MIDAZOLAM HYDROCHLORIDE 1 MG/ML
1-2 INJECTION INTRAMUSCULAR; INTRAVENOUS
Status: DISCONTINUED | OUTPATIENT
Start: 2020-01-01 | End: 2020-01-01

## 2020-01-01 RX ORDER — LABETALOL HYDROCHLORIDE 5 MG/ML
10 INJECTION, SOLUTION INTRAVENOUS EVERY 4 HOURS PRN
Status: DISCONTINUED | OUTPATIENT
Start: 2020-01-01 | End: 2020-01-01

## 2020-01-01 RX ORDER — ONDANSETRON 2 MG/ML
4 INJECTION INTRAMUSCULAR; INTRAVENOUS EVERY 4 HOURS PRN
Status: DISCONTINUED | OUTPATIENT
Start: 2020-01-01 | End: 2020-01-01

## 2020-01-01 RX ORDER — ALLOPURINOL 100 MG/1
100 TABLET ORAL 2 TIMES DAILY
Status: DISCONTINUED | OUTPATIENT
Start: 2020-01-01 | End: 2020-01-01

## 2020-01-01 RX ORDER — SODIUM CHLORIDE FOR INHALATION 3 %
3 VIAL, NEBULIZER (ML) INHALATION
Status: DISCONTINUED | OUTPATIENT
Start: 2020-01-01 | End: 2020-01-01

## 2020-01-01 RX ORDER — DOXYCYCLINE 100 MG/1
100 TABLET ORAL EVERY 12 HOURS
Status: COMPLETED | OUTPATIENT
Start: 2020-01-01 | End: 2020-01-01

## 2020-01-01 RX ORDER — ASCORBIC ACID 500 MG
500 TABLET ORAL DAILY
Status: DISCONTINUED | OUTPATIENT
Start: 2020-01-01 | End: 2020-01-01

## 2020-01-01 RX ORDER — LOSARTAN POTASSIUM 50 MG/1
25 TABLET ORAL 2 TIMES DAILY
Status: DISCONTINUED | OUTPATIENT
Start: 2020-01-01 | End: 2020-01-01

## 2020-01-01 RX ORDER — AMIODARONE HYDROCHLORIDE 200 MG/1
200 TABLET ORAL TWICE DAILY
Status: DISCONTINUED | OUTPATIENT
Start: 2020-01-01 | End: 2020-01-01

## 2020-01-01 RX ORDER — PHENYLEPHRINE HCL IN 0.9% NACL 0.5 MG/5ML
200 SYRINGE (ML) INTRAVENOUS
Status: ACTIVE | OUTPATIENT
Start: 2020-01-01 | End: 2020-01-01

## 2020-01-01 RX ORDER — FUROSEMIDE 10 MG/ML
40 INJECTION INTRAMUSCULAR; INTRAVENOUS ONCE
Status: COMPLETED | OUTPATIENT
Start: 2020-01-01 | End: 2020-01-01

## 2020-01-01 RX ORDER — LORAZEPAM 2 MG/ML
1 CONCENTRATE ORAL
Status: DISCONTINUED | OUTPATIENT
Start: 2020-01-01 | End: 2020-05-16 | Stop reason: HOSPADM

## 2020-01-01 RX ORDER — ONDANSETRON 2 MG/ML
4 INJECTION INTRAMUSCULAR; INTRAVENOUS EVERY 6 HOURS PRN
Status: DISCONTINUED | OUTPATIENT
Start: 2020-01-01 | End: 2020-01-01

## 2020-01-01 RX ORDER — ONDANSETRON 4 MG/1
4 TABLET, ORALLY DISINTEGRATING ORAL EVERY 6 HOURS PRN
Status: DISCONTINUED | OUTPATIENT
Start: 2020-01-01 | End: 2020-01-01

## 2020-01-01 RX ORDER — DEXAMETHASONE SODIUM PHOSPHATE 4 MG/ML
4 INJECTION, SOLUTION INTRA-ARTICULAR; INTRALESIONAL; INTRAMUSCULAR; INTRAVENOUS; SOFT TISSUE EVERY 6 HOURS
Status: COMPLETED | OUTPATIENT
Start: 2020-01-01 | End: 2020-01-01

## 2020-01-01 RX ORDER — HYDROMORPHONE HYDROCHLORIDE 2 MG/ML
2-4 INJECTION, SOLUTION INTRAMUSCULAR; INTRAVENOUS; SUBCUTANEOUS
Status: DISCONTINUED | OUTPATIENT
Start: 2020-01-01 | End: 2020-01-01

## 2020-01-01 RX ORDER — PHENYLEPHRINE HCL IN 0.9% NACL 0.5 MG/5ML
SYRINGE (ML) INTRAVENOUS
Status: DISCONTINUED
Start: 2020-01-01 | End: 2020-01-01

## 2020-01-01 RX ORDER — MIDODRINE HYDROCHLORIDE 5 MG/1
10 TABLET ORAL EVERY 8 HOURS
Status: DISCONTINUED | OUTPATIENT
Start: 2020-01-01 | End: 2020-01-01

## 2020-01-01 RX ORDER — SODIUM CHLORIDE, SODIUM LACTATE, POTASSIUM CHLORIDE, CALCIUM CHLORIDE 600; 310; 30; 20 MG/100ML; MG/100ML; MG/100ML; MG/100ML
1000 INJECTION, SOLUTION INTRAVENOUS ONCE
Status: ACTIVE | OUTPATIENT
Start: 2020-01-01 | End: 2020-01-01

## 2020-01-01 RX ORDER — PHENYLEPHRINE HCL IN 0.9% NACL 0.5 MG/5ML
300 SYRINGE (ML) INTRAVENOUS
Status: DISCONTINUED | OUTPATIENT
Start: 2020-01-01 | End: 2020-01-01

## 2020-01-01 RX ORDER — PHENYLEPHRINE HCL IN 0.9% NACL 0.5 MG/5ML
200 SYRINGE (ML) INTRAVENOUS
Status: DISCONTINUED | OUTPATIENT
Start: 2020-01-01 | End: 2020-01-01

## 2020-01-01 RX ORDER — PHENYLEPHRINE HCL IN 0.9% NACL 0.5 MG/5ML
300 SYRINGE (ML) INTRAVENOUS
Status: ACTIVE | OUTPATIENT
Start: 2020-01-01 | End: 2020-01-01

## 2020-01-01 RX ORDER — METOPROLOL SUCCINATE 25 MG/1
50 TABLET, EXTENDED RELEASE ORAL DAILY
Status: DISCONTINUED | OUTPATIENT
Start: 2020-01-01 | End: 2020-01-01

## 2020-01-01 RX ORDER — DEXMEDETOMIDINE HYDROCHLORIDE 4 UG/ML
.1-1.5 INJECTION INTRAVENOUS CONTINUOUS
Status: DISCONTINUED | OUTPATIENT
Start: 2020-01-01 | End: 2020-01-01

## 2020-01-01 RX ORDER — PROPOFOL 10 MG/ML
60 INJECTION, EMULSION INTRAVENOUS ONCE
Status: COMPLETED | OUTPATIENT
Start: 2020-01-01 | End: 2020-01-01

## 2020-01-01 RX ADMIN — PIPERACILLIN AND TAZOBACTAM 4.5 G: 4; .5 INJECTION, POWDER, LYOPHILIZED, FOR SOLUTION INTRAVENOUS; PARENTERAL at 13:23

## 2020-01-01 RX ADMIN — LORAZEPAM 2 MG: 2 INJECTION INTRAMUSCULAR; INTRAVENOUS at 21:39

## 2020-01-01 RX ADMIN — IPRATROPIUM BROMIDE AND ALBUTEROL SULFATE 3 ML: .5; 3 SOLUTION RESPIRATORY (INHALATION) at 22:32

## 2020-01-01 RX ADMIN — GUAIFENESIN 200 MG: 100 SOLUTION ORAL at 17:18

## 2020-01-01 RX ADMIN — APIXABAN 5 MG: 5 TABLET, FILM COATED ORAL at 05:22

## 2020-01-01 RX ADMIN — SENNOSIDES AND DOCUSATE SODIUM 2 TABLET: 8.6; 5 TABLET ORAL at 05:08

## 2020-01-01 RX ADMIN — AMIODARONE HYDROCHLORIDE 200 MG: 200 TABLET ORAL at 18:23

## 2020-01-01 RX ADMIN — Medication 500 MG: at 05:22

## 2020-01-01 RX ADMIN — NOREPINEPHRINE BITARTRATE 8 MG: 1 INJECTION INTRAVENOUS at 21:00

## 2020-01-01 RX ADMIN — AMIODARONE HYDROCHLORIDE 200 MG: 200 TABLET ORAL at 05:34

## 2020-01-01 RX ADMIN — FENTANYL CITRATE 100 MCG: 0.05 INJECTION, SOLUTION INTRAMUSCULAR; INTRAVENOUS at 23:21

## 2020-01-01 RX ADMIN — MIDODRINE HYDROCHLORIDE 10 MG: 5 TABLET ORAL at 10:07

## 2020-01-01 RX ADMIN — APIXABAN 5 MG: 5 TABLET, FILM COATED ORAL at 18:25

## 2020-01-01 RX ADMIN — FENTANYL CITRATE 100 MCG: 0.05 INJECTION, SOLUTION INTRAMUSCULAR; INTRAVENOUS at 05:09

## 2020-01-01 RX ADMIN — IPRATROPIUM BROMIDE AND ALBUTEROL SULFATE 3 ML: .5; 3 SOLUTION RESPIRATORY (INHALATION) at 06:55

## 2020-01-01 RX ADMIN — IPRATROPIUM BROMIDE AND ALBUTEROL SULFATE 3 ML: .5; 3 SOLUTION RESPIRATORY (INHALATION) at 03:09

## 2020-01-01 RX ADMIN — Medication 300 MCG: at 20:51

## 2020-01-01 RX ADMIN — ASPIRIN 81 MG 81 MG: 81 TABLET ORAL at 05:07

## 2020-01-01 RX ADMIN — ONDANSETRON 4 MG: 2 INJECTION INTRAMUSCULAR; INTRAVENOUS at 18:32

## 2020-01-01 RX ADMIN — DEXMEDETOMIDINE HYDROCHLORIDE 0.2 MCG/KG/HR: 4 INJECTION INTRAVENOUS at 06:54

## 2020-01-01 RX ADMIN — APIXABAN 5 MG: 5 TABLET, FILM COATED ORAL at 05:34

## 2020-01-01 RX ADMIN — FAMOTIDINE 20 MG: 20 TABLET ORAL at 06:00

## 2020-01-01 RX ADMIN — ALBUTEROL SULFATE 2.5 MG: 2.5 SOLUTION RESPIRATORY (INHALATION) at 06:32

## 2020-01-01 RX ADMIN — Medication 200 MCG/HR: at 00:47

## 2020-01-01 RX ADMIN — SENNOSIDES AND DOCUSATE SODIUM 2 TABLET: 8.6; 5 TABLET ORAL at 05:34

## 2020-01-01 RX ADMIN — POTASSIUM BICARBONATE 25 MEQ: 978 TABLET, EFFERVESCENT ORAL at 05:03

## 2020-01-01 RX ADMIN — APIXABAN 5 MG: 5 TABLET, FILM COATED ORAL at 16:46

## 2020-01-01 RX ADMIN — GUAIFENESIN 200 MG: 100 SOLUTION ORAL at 02:12

## 2020-01-01 RX ADMIN — FUROSEMIDE 20 MG: 10 INJECTION, SOLUTION INTRAMUSCULAR; INTRAVENOUS at 05:06

## 2020-01-01 RX ADMIN — ALLOPURINOL 100 MG: 100 TABLET ORAL at 06:00

## 2020-01-01 RX ADMIN — AMIODARONE HYDROCHLORIDE 200 MG: 200 TABLET ORAL at 17:18

## 2020-01-01 RX ADMIN — PIPERACILLIN AND TAZOBACTAM 4.5 G: 4; .5 INJECTION, POWDER, LYOPHILIZED, FOR SOLUTION INTRAVENOUS; PARENTERAL at 20:59

## 2020-01-01 RX ADMIN — PROPOFOL 30 MG: 10 INJECTION, EMULSION INTRAVENOUS at 01:53

## 2020-01-01 RX ADMIN — GUAIFENESIN 200 MG: 100 SOLUTION ORAL at 05:33

## 2020-01-01 RX ADMIN — ATROPINE SULFATE 1 MG: 0.1 INJECTION, SOLUTION INTRAVENOUS at 23:30

## 2020-01-01 RX ADMIN — PROPOFOL 10 MCG/KG/MIN: 10 INJECTION, EMULSION INTRAVENOUS at 21:18

## 2020-01-01 RX ADMIN — PROPOFOL 60 MG: 10 INJECTION, EMULSION INTRAVENOUS at 20:50

## 2020-01-01 RX ADMIN — LOSARTAN POTASSIUM 25 MG: 50 TABLET, FILM COATED ORAL at 05:24

## 2020-01-01 RX ADMIN — ROCURONIUM BROMIDE 50 MG: 10 INJECTION INTRAVENOUS at 01:48

## 2020-01-01 RX ADMIN — RACEPINEPHRINE HYDROCHLORIDE 0.5 ML: 11.25 SOLUTION RESPIRATORY (INHALATION) at 11:02

## 2020-01-01 RX ADMIN — MIDAZOLAM 10 MG/HR: 5 INJECTION INTRAMUSCULAR; INTRAVENOUS at 01:23

## 2020-01-01 RX ADMIN — SENNOSIDES AND DOCUSATE SODIUM 2 TABLET: 8.6; 5 TABLET ORAL at 18:23

## 2020-01-01 RX ADMIN — METHYLPREDNISOLONE SODIUM SUCCINATE 125 MG: 125 INJECTION, POWDER, FOR SOLUTION INTRAMUSCULAR; INTRAVENOUS at 16:57

## 2020-01-01 RX ADMIN — MIDAZOLAM HYDROCHLORIDE 2 MG: 1 INJECTION, SOLUTION INTRAMUSCULAR; INTRAVENOUS at 15:31

## 2020-01-01 RX ADMIN — GUAIFENESIN 200 MG: 100 SOLUTION ORAL at 13:27

## 2020-01-01 RX ADMIN — FUROSEMIDE 20 MG: 10 INJECTION, SOLUTION INTRAMUSCULAR; INTRAVENOUS at 15:44

## 2020-01-01 RX ADMIN — Medication 200 MCG: at 12:53

## 2020-01-01 RX ADMIN — APIXABAN 5 MG: 5 TABLET, FILM COATED ORAL at 05:09

## 2020-01-01 RX ADMIN — POTASSIUM BICARBONATE 25 MEQ: 978 TABLET, EFFERVESCENT ORAL at 05:09

## 2020-01-01 RX ADMIN — PIPERACILLIN AND TAZOBACTAM 4.5 G: 4; .5 INJECTION, POWDER, LYOPHILIZED, FOR SOLUTION INTRAVENOUS; PARENTERAL at 16:59

## 2020-01-01 RX ADMIN — POTASSIUM PHOSPHATE, MONOBASIC AND POTASSIUM PHOSPHATE, DIBASIC 30 MMOL: 224; 236 INJECTION, SOLUTION, CONCENTRATE INTRAVENOUS at 12:08

## 2020-01-01 RX ADMIN — METOPROLOL TARTRATE 5 MG: 5 INJECTION, SOLUTION INTRAVENOUS at 05:43

## 2020-01-01 RX ADMIN — FUROSEMIDE 20 MG: 10 INJECTION, SOLUTION INTRAMUSCULAR; INTRAVENOUS at 17:04

## 2020-01-01 RX ADMIN — MICROFIBRILLAR COLLAGEN HEMOSTAT POWDER: POWDER at 01:36

## 2020-01-01 RX ADMIN — DOXYCYCLINE 100 MG: 100 INJECTION, POWDER, LYOPHILIZED, FOR SOLUTION INTRAVENOUS at 04:52

## 2020-01-01 RX ADMIN — MIDAZOLAM HYDROCHLORIDE 2 MG: 1 INJECTION, SOLUTION INTRAMUSCULAR; INTRAVENOUS at 10:15

## 2020-01-01 RX ADMIN — MIDODRINE HYDROCHLORIDE 10 MG: 5 TABLET ORAL at 14:51

## 2020-01-01 RX ADMIN — OXYCODONE HYDROCHLORIDE 10 MG: 10 TABLET ORAL at 10:07

## 2020-01-01 RX ADMIN — ALLOPURINOL 100 MG: 100 TABLET ORAL at 19:21

## 2020-01-01 RX ADMIN — SODIUM CHLORIDE SOLN NEBU 3% 3 ML: 3 NEBU SOLN at 15:20

## 2020-01-01 RX ADMIN — PROPOFOL 5 MCG/KG/MIN: 10 INJECTION, EMULSION INTRAVENOUS at 09:50

## 2020-01-01 RX ADMIN — ASPIRIN 81 MG 81 MG: 81 TABLET ORAL at 04:51

## 2020-01-01 RX ADMIN — IPRATROPIUM BROMIDE AND ALBUTEROL SULFATE 3 ML: .5; 3 SOLUTION RESPIRATORY (INHALATION) at 02:30

## 2020-01-01 RX ADMIN — AMIODARONE HYDROCHLORIDE 200 MG: 200 TABLET ORAL at 05:31

## 2020-01-01 RX ADMIN — Medication 300 MCG: at 20:54

## 2020-01-01 RX ADMIN — DOXYCYCLINE 100 MG: 100 INJECTION, POWDER, LYOPHILIZED, FOR SOLUTION INTRAVENOUS at 04:48

## 2020-01-01 RX ADMIN — IPRATROPIUM BROMIDE AND ALBUTEROL SULFATE 3 ML: .5; 3 SOLUTION RESPIRATORY (INHALATION) at 06:58

## 2020-01-01 RX ADMIN — SENNOSIDES AND DOCUSATE SODIUM 2 TABLET: 8.6; 5 TABLET ORAL at 05:29

## 2020-01-01 RX ADMIN — ALLOPURINOL 100 MG: 100 TABLET ORAL at 17:54

## 2020-01-01 RX ADMIN — MORPHINE SULFATE 2 MG: 10 INJECTION INTRAVENOUS at 17:45

## 2020-01-01 RX ADMIN — DOXYCYCLINE 100 MG: 100 INJECTION, POWDER, LYOPHILIZED, FOR SOLUTION INTRAVENOUS at 00:46

## 2020-01-01 RX ADMIN — ASPIRIN 81 MG 81 MG: 81 TABLET ORAL at 05:04

## 2020-01-01 RX ADMIN — AMIODARONE HYDROCHLORIDE 200 MG: 200 TABLET ORAL at 17:54

## 2020-01-01 RX ADMIN — LOSARTAN POTASSIUM 25 MG: 50 TABLET, FILM COATED ORAL at 05:11

## 2020-01-01 RX ADMIN — ALLOPURINOL 100 MG: 100 TABLET ORAL at 17:18

## 2020-01-01 RX ADMIN — Medication 500 MG: at 05:11

## 2020-01-01 RX ADMIN — AMIODARONE HYDROCHLORIDE 200 MG: 200 TABLET ORAL at 05:11

## 2020-01-01 RX ADMIN — LABETALOL HYDROCHLORIDE 10 MG: 5 INJECTION, SOLUTION INTRAVENOUS at 01:33

## 2020-01-01 RX ADMIN — POTASSIUM BICARBONATE 25 MEQ: 978 TABLET, EFFERVESCENT ORAL at 18:03

## 2020-01-01 RX ADMIN — LOSARTAN POTASSIUM 25 MG: 25 TABLET, FILM COATED ORAL at 17:54

## 2020-01-01 RX ADMIN — CEFTRIAXONE SODIUM 2 G: 2 INJECTION, POWDER, FOR SOLUTION INTRAMUSCULAR; INTRAVENOUS at 05:06

## 2020-01-01 RX ADMIN — POTASSIUM BICARBONATE 25 MEQ: 978 TABLET, EFFERVESCENT ORAL at 10:52

## 2020-01-01 RX ADMIN — AMIODARONE HYDROCHLORIDE 200 MG: 200 TABLET ORAL at 17:13

## 2020-01-01 RX ADMIN — DEXAMETHASONE SODIUM PHOSPHATE 4 MG: 4 INJECTION, SOLUTION INTRA-ARTICULAR; INTRALESIONAL; INTRAMUSCULAR; INTRAVENOUS; SOFT TISSUE at 23:53

## 2020-01-01 RX ADMIN — APIXABAN 5 MG: 5 TABLET, FILM COATED ORAL at 19:21

## 2020-01-01 RX ADMIN — ASPIRIN 81 MG 81 MG: 81 TABLET ORAL at 05:24

## 2020-01-01 RX ADMIN — ALLOPURINOL 100 MG: 100 TABLET ORAL at 05:09

## 2020-01-01 RX ADMIN — FENTANYL CITRATE 100 MCG: 0.05 INJECTION, SOLUTION INTRAMUSCULAR; INTRAVENOUS at 06:38

## 2020-01-01 RX ADMIN — FAMOTIDINE 20 MG: 20 TABLET, FILM COATED ORAL at 05:08

## 2020-01-01 RX ADMIN — SENNOSIDES AND DOCUSATE SODIUM 2 TABLET: 8.6; 5 TABLET ORAL at 18:03

## 2020-01-01 RX ADMIN — APIXABAN 5 MG: 5 TABLET, FILM COATED ORAL at 09:42

## 2020-01-01 RX ADMIN — IPRATROPIUM BROMIDE AND ALBUTEROL SULFATE 3 ML: .5; 3 SOLUTION RESPIRATORY (INHALATION) at 02:43

## 2020-01-01 RX ADMIN — METHYLPREDNISOLONE SODIUM SUCCINATE 62.5 MG: 125 INJECTION, POWDER, FOR SOLUTION INTRAMUSCULAR; INTRAVENOUS at 13:30

## 2020-01-01 RX ADMIN — POTASSIUM BICARBONATE 25 MEQ: 978 TABLET, EFFERVESCENT ORAL at 05:24

## 2020-01-01 RX ADMIN — Medication 500 MG: at 05:09

## 2020-01-01 RX ADMIN — SENNOSIDES AND DOCUSATE SODIUM 2 TABLET: 8.6; 5 TABLET ORAL at 17:00

## 2020-01-01 RX ADMIN — LOSARTAN POTASSIUM 25 MG: 25 TABLET, FILM COATED ORAL at 05:34

## 2020-01-01 RX ADMIN — FENTANYL CITRATE 100 MCG: 0.05 INJECTION, SOLUTION INTRAMUSCULAR; INTRAVENOUS at 18:09

## 2020-01-01 RX ADMIN — POTASSIUM CHLORIDE 20 MEQ: 1500 TABLET, EXTENDED RELEASE ORAL at 17:00

## 2020-01-01 RX ADMIN — ALLOPURINOL 100 MG: 100 TABLET ORAL at 05:07

## 2020-01-01 RX ADMIN — FENTANYL CITRATE 50 MCG: 0.05 INJECTION, SOLUTION INTRAMUSCULAR; INTRAVENOUS at 10:15

## 2020-01-01 RX ADMIN — DEXAMETHASONE SODIUM PHOSPHATE 4 MG: 4 INJECTION, SOLUTION INTRA-ARTICULAR; INTRALESIONAL; INTRAMUSCULAR; INTRAVENOUS; SOFT TISSUE at 16:47

## 2020-01-01 RX ADMIN — SODIUM CHLORIDE, POTASSIUM CHLORIDE, SODIUM LACTATE AND CALCIUM CHLORIDE 500 ML: 600; 310; 30; 20 INJECTION, SOLUTION INTRAVENOUS at 00:23

## 2020-01-01 RX ADMIN — RACEPINEPHRINE HYDROCHLORIDE 0.5 ML: 11.25 SOLUTION RESPIRATORY (INHALATION) at 17:57

## 2020-01-01 RX ADMIN — DOXYCYCLINE 100 MG: 100 TABLET ORAL at 05:24

## 2020-01-01 RX ADMIN — MIDODRINE HYDROCHLORIDE 5 MG: 5 TABLET ORAL at 17:50

## 2020-01-01 RX ADMIN — PIPERACILLIN AND TAZOBACTAM 4.5 G: 4; .5 INJECTION, POWDER, LYOPHILIZED, FOR SOLUTION INTRAVENOUS; PARENTERAL at 05:30

## 2020-01-01 RX ADMIN — IPRATROPIUM BROMIDE AND ALBUTEROL SULFATE 3 ML: .5; 3 SOLUTION RESPIRATORY (INHALATION) at 19:27

## 2020-01-01 RX ADMIN — FENTANYL CITRATE 50 MCG: 0.05 INJECTION, SOLUTION INTRAMUSCULAR; INTRAVENOUS at 10:48

## 2020-01-01 RX ADMIN — MIDAZOLAM HYDROCHLORIDE 2 MG: 1 INJECTION, SOLUTION INTRAMUSCULAR; INTRAVENOUS at 14:14

## 2020-01-01 RX ADMIN — FUROSEMIDE 20 MG: 10 INJECTION, SOLUTION INTRAMUSCULAR; INTRAVENOUS at 05:04

## 2020-01-01 RX ADMIN — FENTANYL CITRATE 100 MCG: 0.05 INJECTION, SOLUTION INTRAMUSCULAR; INTRAVENOUS at 00:43

## 2020-01-01 RX ADMIN — FENTANYL CITRATE 100 MCG: 0.05 INJECTION, SOLUTION INTRAMUSCULAR; INTRAVENOUS at 06:54

## 2020-01-01 RX ADMIN — CEFTRIAXONE SODIUM 2 G: 2 INJECTION, POWDER, FOR SOLUTION INTRAMUSCULAR; INTRAVENOUS at 05:16

## 2020-01-01 RX ADMIN — Medication 150 MCG/HR: at 19:03

## 2020-01-01 RX ADMIN — FUROSEMIDE 20 MG: 10 INJECTION, SOLUTION INTRAMUSCULAR; INTRAVENOUS at 15:25

## 2020-01-01 RX ADMIN — SENNOSIDES AND DOCUSATE SODIUM 2 TABLET: 8.6; 5 TABLET ORAL at 17:12

## 2020-01-01 RX ADMIN — ALLOPURINOL 100 MG: 100 TABLET ORAL at 05:24

## 2020-01-01 RX ADMIN — APIXABAN 5 MG: 5 TABLET, FILM COATED ORAL at 05:11

## 2020-01-01 RX ADMIN — DEXAMETHASONE SODIUM PHOSPHATE 4 MG: 4 INJECTION, SOLUTION INTRA-ARTICULAR; INTRALESIONAL; INTRAMUSCULAR; INTRAVENOUS; SOFT TISSUE at 18:22

## 2020-01-01 RX ADMIN — FAMOTIDINE 20 MG: 20 TABLET ORAL at 17:00

## 2020-01-01 RX ADMIN — FAMOTIDINE 20 MG: 20 TABLET, FILM COATED ORAL at 05:34

## 2020-01-01 RX ADMIN — FAMOTIDINE 20 MG: 10 INJECTION INTRAVENOUS at 04:48

## 2020-01-01 RX ADMIN — FENTANYL CITRATE 100 MCG: 0.05 INJECTION, SOLUTION INTRAMUSCULAR; INTRAVENOUS at 06:20

## 2020-01-01 RX ADMIN — FENTANYL CITRATE 100 MCG: 0.05 INJECTION, SOLUTION INTRAMUSCULAR; INTRAVENOUS at 05:51

## 2020-01-01 RX ADMIN — BISACODYL 10 MG: 10 SUPPOSITORY RECTAL at 05:35

## 2020-01-01 RX ADMIN — MAGNESIUM SULFATE 2 G: 2 INJECTION INTRAVENOUS at 05:49

## 2020-01-01 RX ADMIN — ROCURONIUM BROMIDE 50 MG: 10 INJECTION, SOLUTION INTRAVENOUS at 20:51

## 2020-01-01 RX ADMIN — SENNOSIDES AND DOCUSATE SODIUM 2 TABLET: 8.6; 5 TABLET ORAL at 05:07

## 2020-01-01 RX ADMIN — FENTANYL CITRATE 100 MCG: 50 INJECTION, SOLUTION INTRAMUSCULAR; INTRAVENOUS at 05:08

## 2020-01-01 RX ADMIN — LOSARTAN POTASSIUM 25 MG: 25 TABLET, FILM COATED ORAL at 05:22

## 2020-01-01 RX ADMIN — PROPOFOL 40 MCG/KG/MIN: 10 INJECTION, EMULSION INTRAVENOUS at 01:59

## 2020-01-01 RX ADMIN — SODIUM CHLORIDE, POTASSIUM CHLORIDE, SODIUM LACTATE AND CALCIUM CHLORIDE 500 ML: 600; 310; 30; 20 INJECTION, SOLUTION INTRAVENOUS at 02:21

## 2020-01-01 RX ADMIN — METOPROLOL TARTRATE 5 MG: 5 INJECTION, SOLUTION INTRAVENOUS at 05:27

## 2020-01-01 RX ADMIN — Medication 500 MG: at 05:31

## 2020-01-01 RX ADMIN — LOSARTAN POTASSIUM 25 MG: 25 TABLET, FILM COATED ORAL at 16:40

## 2020-01-01 RX ADMIN — RACEPINEPHRINE HYDROCHLORIDE 0.5 ML: 11.25 SOLUTION RESPIRATORY (INHALATION) at 16:55

## 2020-01-01 RX ADMIN — Medication 100 MCG/HR: at 09:54

## 2020-01-01 RX ADMIN — APIXABAN 5 MG: 5 TABLET, FILM COATED ORAL at 05:29

## 2020-01-01 RX ADMIN — LOSARTAN POTASSIUM 25 MG: 25 TABLET, FILM COATED ORAL at 05:08

## 2020-01-01 RX ADMIN — ALBUTEROL SULFATE 2.5 MG: 2.5 SOLUTION RESPIRATORY (INHALATION) at 10:07

## 2020-01-01 RX ADMIN — DOXYCYCLINE 100 MG: 100 TABLET ORAL at 05:04

## 2020-01-01 RX ADMIN — POTASSIUM BICARBONATE 25 MEQ: 978 TABLET, EFFERVESCENT ORAL at 19:21

## 2020-01-01 RX ADMIN — ALLOPURINOL 100 MG: 100 TABLET ORAL at 16:40

## 2020-01-01 RX ADMIN — DEXMEDETOMIDINE HYDROCHLORIDE 0.3 MCG/KG/HR: 4 INJECTION INTRAVENOUS at 05:08

## 2020-01-01 RX ADMIN — DEXAMETHASONE SODIUM PHOSPHATE 4 MG: 4 INJECTION, SOLUTION INTRA-ARTICULAR; INTRALESIONAL; INTRAMUSCULAR; INTRAVENOUS; SOFT TISSUE at 12:49

## 2020-01-01 RX ADMIN — GUAIFENESIN 200 MG: 100 SOLUTION ORAL at 10:20

## 2020-01-01 RX ADMIN — POTASSIUM BICARBONATE 25 MEQ: 978 TABLET, EFFERVESCENT ORAL at 05:12

## 2020-01-01 RX ADMIN — PROPOFOL 80 MCG/KG/MIN: 10 INJECTION, EMULSION INTRAVENOUS at 04:43

## 2020-01-01 RX ADMIN — FENTANYL CITRATE 25 MCG: 0.05 INJECTION, SOLUTION INTRAMUSCULAR; INTRAVENOUS at 02:37

## 2020-01-01 RX ADMIN — IOHEXOL 65 ML: 350 INJECTION, SOLUTION INTRAVENOUS at 15:45

## 2020-01-01 RX ADMIN — FENTANYL CITRATE 50 MCG: 0.05 INJECTION, SOLUTION INTRAMUSCULAR; INTRAVENOUS at 15:30

## 2020-01-01 RX ADMIN — FAMOTIDINE 20 MG: 20 TABLET ORAL at 18:04

## 2020-01-01 RX ADMIN — SODIUM CHLORIDE, POTASSIUM CHLORIDE, SODIUM LACTATE AND CALCIUM CHLORIDE 500 ML: 600; 310; 30; 20 INJECTION, SOLUTION INTRAVENOUS at 07:50

## 2020-01-01 RX ADMIN — FUROSEMIDE 40 MG: 10 INJECTION, SOLUTION INTRAMUSCULAR; INTRAVENOUS at 11:17

## 2020-01-01 RX ADMIN — APIXABAN 5 MG: 5 TABLET, FILM COATED ORAL at 17:54

## 2020-01-01 RX ADMIN — Medication 200 MCG: at 01:49

## 2020-01-01 RX ADMIN — POTASSIUM PHOSPHATE, MONOBASIC AND POTASSIUM PHOSPHATE, DIBASIC 30 MMOL: 224; 236 INJECTION, SOLUTION, CONCENTRATE INTRAVENOUS at 05:54

## 2020-01-01 RX ADMIN — Medication 500 MG: at 05:29

## 2020-01-01 RX ADMIN — SENNOSIDES AND DOCUSATE SODIUM 2 TABLET: 8.6; 5 TABLET ORAL at 16:46

## 2020-01-01 RX ADMIN — FAMOTIDINE 20 MG: 10 INJECTION INTRAVENOUS at 17:12

## 2020-01-01 RX ADMIN — FENTANYL CITRATE 100 MCG: 0.05 INJECTION, SOLUTION INTRAMUSCULAR; INTRAVENOUS at 16:01

## 2020-01-01 RX ADMIN — FUROSEMIDE 20 MG: 10 INJECTION, SOLUTION INTRAMUSCULAR; INTRAVENOUS at 05:22

## 2020-01-01 RX ADMIN — FENTANYL CITRATE 100 MCG: 0.05 INJECTION, SOLUTION INTRAMUSCULAR; INTRAVENOUS at 16:28

## 2020-01-01 RX ADMIN — GUAIFENESIN 200 MG: 100 SOLUTION ORAL at 13:58

## 2020-01-01 RX ADMIN — Medication 500 MG: at 05:24

## 2020-01-01 RX ADMIN — ALLOPURINOL 100 MG: 100 TABLET ORAL at 05:29

## 2020-01-01 RX ADMIN — AMIODARONE HYDROCHLORIDE 200 MG: 200 TABLET ORAL at 16:46

## 2020-01-01 RX ADMIN — MIDODRINE HYDROCHLORIDE 5 MG: 5 TABLET ORAL at 10:51

## 2020-01-01 RX ADMIN — LIDOCAINE HYDROCHLORIDE 1 ML: 10 INJECTION, SOLUTION EPIDURAL; INFILTRATION; INTRACAUDAL at 23:28

## 2020-01-01 RX ADMIN — HEPARIN SODIUM 800 UNITS/HR: 5000 INJECTION, SOLUTION INTRAVENOUS at 19:16

## 2020-01-01 RX ADMIN — MIDAZOLAM HYDROCHLORIDE 4 MG: 1 INJECTION, SOLUTION INTRAMUSCULAR; INTRAVENOUS at 23:55

## 2020-01-01 RX ADMIN — IPRATROPIUM BROMIDE AND ALBUTEROL SULFATE 3 ML: .5; 3 SOLUTION RESPIRATORY (INHALATION) at 14:16

## 2020-01-01 RX ADMIN — PROPOFOL 80 MCG/KG/MIN: 10 INJECTION, EMULSION INTRAVENOUS at 03:00

## 2020-01-01 RX ADMIN — AMIODARONE HYDROCHLORIDE 200 MG: 200 TABLET ORAL at 05:22

## 2020-01-01 RX ADMIN — FENTANYL CITRATE 100 MCG: 0.05 INJECTION, SOLUTION INTRAMUSCULAR; INTRAVENOUS at 05:00

## 2020-01-01 RX ADMIN — DOXYCYCLINE 100 MG: 100 TABLET ORAL at 18:03

## 2020-01-01 RX ADMIN — IPRATROPIUM BROMIDE AND ALBUTEROL SULFATE 3 ML: .5; 3 SOLUTION RESPIRATORY (INHALATION) at 10:49

## 2020-01-01 RX ADMIN — DOXYCYCLINE 100 MG: 100 TABLET ORAL at 17:04

## 2020-01-01 RX ADMIN — APIXABAN 5 MG: 5 TABLET, FILM COATED ORAL at 05:31

## 2020-01-01 RX ADMIN — FENTANYL CITRATE 50 MCG: 0.05 INJECTION, SOLUTION INTRAMUSCULAR; INTRAVENOUS at 11:50

## 2020-01-01 RX ADMIN — CEFTRIAXONE SODIUM 2 G: 2 INJECTION, POWDER, FOR SOLUTION INTRAMUSCULAR; INTRAVENOUS at 05:04

## 2020-01-01 RX ADMIN — LORAZEPAM 1 MG: 2 SOLUTION, CONCENTRATE ORAL at 15:45

## 2020-01-01 RX ADMIN — HEPARIN SODIUM 800 UNITS/HR: 5000 INJECTION, SOLUTION INTRAVENOUS at 19:51

## 2020-01-01 RX ADMIN — ALLOPURINOL 100 MG: 100 TABLET ORAL at 18:23

## 2020-01-01 RX ADMIN — DEXAMETHASONE SODIUM PHOSPHATE 4 MG: 4 INJECTION, SOLUTION INTRA-ARTICULAR; INTRALESIONAL; INTRAMUSCULAR; INTRAVENOUS; SOFT TISSUE at 17:12

## 2020-01-01 RX ADMIN — AMIODARONE HYDROCHLORIDE 200 MG: 200 TABLET ORAL at 05:09

## 2020-01-01 RX ADMIN — MIDODRINE HYDROCHLORIDE 10 MG: 5 TABLET ORAL at 05:32

## 2020-01-01 RX ADMIN — FENTANYL CITRATE 50 MCG: 0.05 INJECTION, SOLUTION INTRAMUSCULAR; INTRAVENOUS at 08:52

## 2020-01-01 RX ADMIN — ALLOPURINOL 100 MG: 100 TABLET ORAL at 17:00

## 2020-01-01 RX ADMIN — AMIODARONE HYDROCHLORIDE 200 MG: 200 TABLET ORAL at 16:40

## 2020-01-01 RX ADMIN — NOREPINEPHRINE BITARTRATE 2 MCG/MIN: 1 INJECTION INTRAVENOUS at 14:04

## 2020-01-01 RX ADMIN — PROPOFOL 40 MCG/KG/MIN: 10 INJECTION, EMULSION INTRAVENOUS at 02:20

## 2020-01-01 RX ADMIN — ALLOPURINOL 100 MG: 100 TABLET ORAL at 05:11

## 2020-01-01 RX ADMIN — Medication 150 MCG/HR: at 02:15

## 2020-01-01 RX ADMIN — FENTANYL CITRATE 100 MCG: 0.05 INJECTION, SOLUTION INTRAMUSCULAR; INTRAVENOUS at 13:13

## 2020-01-01 RX ADMIN — ALLOPURINOL 100 MG: 100 TABLET ORAL at 18:04

## 2020-01-01 RX ADMIN — MIDODRINE HYDROCHLORIDE 10 MG: 5 TABLET ORAL at 13:23

## 2020-01-01 RX ADMIN — AMIODARONE HYDROCHLORIDE 200 MG: 200 TABLET ORAL at 09:41

## 2020-01-01 RX ADMIN — ASPIRIN 81 MG 81 MG: 81 TABLET ORAL at 12:48

## 2020-01-01 RX ADMIN — Medication 100 MCG/HR: at 16:41

## 2020-01-01 RX ADMIN — FAMOTIDINE 20 MG: 20 TABLET, FILM COATED ORAL at 05:09

## 2020-01-01 RX ADMIN — FUROSEMIDE 20 MG: 10 INJECTION, SOLUTION INTRAMUSCULAR; INTRAVENOUS at 16:29

## 2020-01-01 RX ADMIN — FENTANYL CITRATE 100 MCG: 0.05 INJECTION, SOLUTION INTRAMUSCULAR; INTRAVENOUS at 15:04

## 2020-01-01 RX ADMIN — APIXABAN 5 MG: 5 TABLET, FILM COATED ORAL at 17:12

## 2020-01-01 RX ADMIN — LOSARTAN POTASSIUM 25 MG: 50 TABLET, FILM COATED ORAL at 19:21

## 2020-01-01 RX ADMIN — SENNOSIDES AND DOCUSATE SODIUM 2 TABLET: 8.6; 5 TABLET ORAL at 18:04

## 2020-01-01 RX ADMIN — SENNOSIDES AND DOCUSATE SODIUM 2 TABLET: 8.6; 5 TABLET ORAL at 17:54

## 2020-01-01 RX ADMIN — AMIODARONE HYDROCHLORIDE 1 MG/MIN: 50 INJECTION, SOLUTION INTRAVENOUS at 13:45

## 2020-01-01 RX ADMIN — SENNOSIDES AND DOCUSATE SODIUM 2 TABLET: 8.6; 5 TABLET ORAL at 16:40

## 2020-01-01 RX ADMIN — FENTANYL CITRATE 100 MCG: 0.05 INJECTION, SOLUTION INTRAMUSCULAR; INTRAVENOUS at 20:15

## 2020-01-01 RX ADMIN — HEPARIN SODIUM 3200 UNITS: 1000 INJECTION, SOLUTION INTRAVENOUS; SUBCUTANEOUS at 06:03

## 2020-01-01 RX ADMIN — DEXAMETHASONE SODIUM PHOSPHATE 4 MG: 4 INJECTION, SOLUTION INTRA-ARTICULAR; INTRALESIONAL; INTRAMUSCULAR; INTRAVENOUS; SOFT TISSUE at 02:24

## 2020-01-01 RX ADMIN — MIDODRINE HYDROCHLORIDE 5 MG: 5 TABLET ORAL at 07:58

## 2020-01-01 RX ADMIN — POTASSIUM BICARBONATE 25 MEQ: 978 TABLET, EFFERVESCENT ORAL at 17:03

## 2020-01-01 RX ADMIN — LABETALOL HYDROCHLORIDE 10 MG: 5 INJECTION, SOLUTION INTRAVENOUS at 20:44

## 2020-01-01 RX ADMIN — Medication 200 MCG: at 20:51

## 2020-01-01 RX ADMIN — FUROSEMIDE 20 MG: 10 INJECTION, SOLUTION INTRAMUSCULAR; INTRAVENOUS at 05:23

## 2020-01-01 RX ADMIN — FAMOTIDINE 20 MG: 10 INJECTION INTRAVENOUS at 00:47

## 2020-01-01 RX ADMIN — MICROFIBRILLAR COLLAGEN HEMOSTAT POWDER 1 EACH: POWDER at 18:30

## 2020-01-01 RX ADMIN — APIXABAN 5 MG: 5 TABLET, FILM COATED ORAL at 05:08

## 2020-01-01 RX ADMIN — FENTANYL CITRATE 100 MCG: 0.05 INJECTION, SOLUTION INTRAMUSCULAR; INTRAVENOUS at 09:22

## 2020-01-01 RX ADMIN — Medication 200 MCG: at 20:43

## 2020-01-01 RX ADMIN — FENTANYL CITRATE 25 MCG: 0.05 INJECTION, SOLUTION INTRAMUSCULAR; INTRAVENOUS at 08:58

## 2020-01-01 RX ADMIN — SENNOSIDES AND DOCUSATE SODIUM 2 TABLET: 8.6; 5 TABLET ORAL at 17:18

## 2020-01-01 RX ADMIN — DEXAMETHASONE SODIUM PHOSPHATE 4 MG: 4 INJECTION, SOLUTION INTRA-ARTICULAR; INTRALESIONAL; INTRAMUSCULAR; INTRAVENOUS; SOFT TISSUE at 05:10

## 2020-01-01 RX ADMIN — AMIODARONE HYDROCHLORIDE 200 MG: 200 TABLET ORAL at 19:21

## 2020-01-01 RX ADMIN — Medication 500 MG: at 05:07

## 2020-01-01 RX ADMIN — HEPARIN SODIUM 1200 UNITS/HR: 5000 INJECTION, SOLUTION INTRAVENOUS at 01:56

## 2020-01-01 RX ADMIN — SODIUM CHLORIDE SOLN NEBU 3% 3 ML: 3 NEBU SOLN at 18:35

## 2020-01-01 RX ADMIN — AMIODARONE HYDROCHLORIDE 150 MG: 1.5 INJECTION, SOLUTION INTRAVENOUS at 13:34

## 2020-01-01 RX ADMIN — LOSARTAN POTASSIUM 25 MG: 25 TABLET, FILM COATED ORAL at 05:09

## 2020-01-01 RX ADMIN — Medication 500 MG: at 04:51

## 2020-01-01 RX ADMIN — IPRATROPIUM BROMIDE AND ALBUTEROL SULFATE 3 ML: .5; 3 SOLUTION RESPIRATORY (INHALATION) at 18:48

## 2020-01-01 RX ADMIN — ALLOPURINOL 100 MG: 100 TABLET ORAL at 04:52

## 2020-01-01 RX ADMIN — PROPOFOL 15 MCG/KG/MIN: 10 INJECTION, EMULSION INTRAVENOUS at 01:29

## 2020-01-01 RX ADMIN — FAMOTIDINE 20 MG: 10 INJECTION INTRAVENOUS at 05:11

## 2020-01-01 RX ADMIN — SENNOSIDES AND DOCUSATE SODIUM 2 TABLET: 8.6; 5 TABLET ORAL at 04:51

## 2020-01-01 RX ADMIN — POLYETHYLENE GLYCOL 3350 1 PACKET: 17 POWDER, FOR SOLUTION ORAL at 18:04

## 2020-01-01 RX ADMIN — FUROSEMIDE 20 MG: 10 INJECTION, SOLUTION INTRAMUSCULAR; INTRAVENOUS at 15:04

## 2020-01-01 RX ADMIN — FENTANYL CITRATE 100 MCG: 0.05 INJECTION, SOLUTION INTRAMUSCULAR; INTRAVENOUS at 18:05

## 2020-01-01 RX ADMIN — DOXYCYCLINE 100 MG: 100 INJECTION, POWDER, LYOPHILIZED, FOR SOLUTION INTRAVENOUS at 17:00

## 2020-01-01 RX ADMIN — ALLOPURINOL 100 MG: 100 TABLET ORAL at 17:12

## 2020-01-01 RX ADMIN — DEXAMETHASONE SODIUM PHOSPHATE 4 MG: 4 INJECTION, SOLUTION INTRA-ARTICULAR; INTRALESIONAL; INTRAMUSCULAR; INTRAVENOUS; SOFT TISSUE at 05:08

## 2020-01-01 RX ADMIN — Medication 500 MG: at 05:04

## 2020-01-01 RX ADMIN — DOXYCYCLINE 100 MG: 100 TABLET ORAL at 18:05

## 2020-01-01 RX ADMIN — DEXAMETHASONE SODIUM PHOSPHATE 4 MG: 4 INJECTION, SOLUTION INTRA-ARTICULAR; INTRALESIONAL; INTRAMUSCULAR; INTRAVENOUS; SOFT TISSUE at 23:17

## 2020-01-01 RX ADMIN — APIXABAN 5 MG: 5 TABLET, FILM COATED ORAL at 16:40

## 2020-01-01 RX ADMIN — NOREPINEPHRINE BITARTRATE 6 MCG/MIN: 1 INJECTION INTRAVENOUS at 21:05

## 2020-01-01 RX ADMIN — FAMOTIDINE 20 MG: 20 TABLET ORAL at 04:51

## 2020-01-01 RX ADMIN — FUROSEMIDE 20 MG: 10 INJECTION, SOLUTION INTRAMUSCULAR; INTRAVENOUS at 05:11

## 2020-01-01 RX ADMIN — POTASSIUM BICARBONATE 25 MEQ: 978 TABLET, EFFERVESCENT ORAL at 18:04

## 2020-01-01 RX ADMIN — CEFTRIAXONE SODIUM 2 G: 2 INJECTION, POWDER, FOR SOLUTION INTRAMUSCULAR; INTRAVENOUS at 01:56

## 2020-01-01 RX ADMIN — MIDAZOLAM HYDROCHLORIDE 2 MG: 1 INJECTION, SOLUTION INTRAMUSCULAR; INTRAVENOUS at 20:36

## 2020-01-01 RX ADMIN — HEPARIN SODIUM 800 UNITS/HR: 5000 INJECTION, SOLUTION INTRAVENOUS at 11:03

## 2020-01-01 RX ADMIN — POTASSIUM BICARBONATE 25 MEQ: 978 TABLET, EFFERVESCENT ORAL at 05:06

## 2020-01-01 RX ADMIN — FENTANYL CITRATE 100 MCG: 0.05 INJECTION, SOLUTION INTRAMUSCULAR; INTRAVENOUS at 02:00

## 2020-01-01 RX ADMIN — IPRATROPIUM BROMIDE AND ALBUTEROL SULFATE 3 ML: .5; 3 SOLUTION RESPIRATORY (INHALATION) at 23:20

## 2020-01-01 RX ADMIN — Medication 200 MCG/HR: at 10:08

## 2020-01-01 RX ADMIN — SENNOSIDES AND DOCUSATE SODIUM 2 TABLET: 8.6; 5 TABLET ORAL at 05:22

## 2020-01-01 RX ADMIN — FAMOTIDINE 20 MG: 20 TABLET, FILM COATED ORAL at 05:22

## 2020-01-01 RX ADMIN — PROPOFOL 60 MCG/KG/MIN: 10 INJECTION, EMULSION INTRAVENOUS at 07:41

## 2020-01-01 RX ADMIN — FAMOTIDINE 20 MG: 20 TABLET ORAL at 05:24

## 2020-01-01 RX ADMIN — MIDODRINE HYDROCHLORIDE 10 MG: 5 TABLET ORAL at 22:50

## 2020-01-01 RX ADMIN — FAMOTIDINE 20 MG: 20 TABLET ORAL at 05:06

## 2020-01-01 RX ADMIN — APIXABAN 5 MG: 5 TABLET, FILM COATED ORAL at 17:18

## 2020-01-01 RX ADMIN — SENNOSIDES AND DOCUSATE SODIUM 2 TABLET: 8.6; 5 TABLET ORAL at 05:31

## 2020-01-01 RX ADMIN — GUAIFENESIN 200 MG: 100 SOLUTION ORAL at 21:24

## 2020-01-01 RX ADMIN — MAGNESIUM HYDROXIDE 30 ML: 400 SUSPENSION ORAL at 07:26

## 2020-01-01 RX ADMIN — POLYETHYLENE GLYCOL 3350 1 PACKET: 17 POWDER, FOR SOLUTION ORAL at 10:20

## 2020-01-01 RX ADMIN — HEPARIN SODIUM 6000 UNITS: 1000 INJECTION, SOLUTION INTRAVENOUS; SUBCUTANEOUS at 01:56

## 2020-01-01 RX ADMIN — DEXAMETHASONE SODIUM PHOSPHATE 4 MG: 4 INJECTION, SOLUTION INTRA-ARTICULAR; INTRALESIONAL; INTRAMUSCULAR; INTRAVENOUS; SOFT TISSUE at 10:09

## 2020-01-01 RX ADMIN — Medication 500 MG: at 05:33

## 2020-01-01 RX ADMIN — HEPARIN SODIUM 800 UNITS/HR: 5000 INJECTION, SOLUTION INTRAVENOUS at 03:17

## 2020-01-01 RX ADMIN — POLYETHYLENE GLYCOL 3350 1 PACKET: 17 POWDER, FOR SOLUTION ORAL at 05:22

## 2020-01-01 RX ADMIN — POTASSIUM BICARBONATE 25 MEQ: 978 TABLET, EFFERVESCENT ORAL at 07:26

## 2020-01-01 RX ADMIN — ALLOPURINOL 100 MG: 100 TABLET ORAL at 16:47

## 2020-01-01 RX ADMIN — FUROSEMIDE 20 MG: 10 INJECTION, SOLUTION INTRAMUSCULAR; INTRAVENOUS at 05:10

## 2020-01-01 RX ADMIN — LOSARTAN POTASSIUM 25 MG: 50 TABLET, FILM COATED ORAL at 17:50

## 2020-01-01 RX ADMIN — ALLOPURINOL 100 MG: 100 TABLET ORAL at 05:32

## 2020-01-01 RX ADMIN — POTASSIUM CHLORIDE 20 MEQ: 1500 TABLET, EXTENDED RELEASE ORAL at 04:51

## 2020-01-01 RX ADMIN — DEXAMETHASONE SODIUM PHOSPHATE 4 MG: 4 INJECTION, SOLUTION INTRA-ARTICULAR; INTRALESIONAL; INTRAMUSCULAR; INTRAVENOUS; SOFT TISSUE at 11:17

## 2020-01-01 RX ADMIN — SODIUM CHLORIDE SOLN NEBU 3% 3 ML: 3 NEBU SOLN at 06:32

## 2020-01-01 RX ADMIN — FAMOTIDINE 20 MG: 10 INJECTION INTRAVENOUS at 16:56

## 2020-01-01 RX ADMIN — IPRATROPIUM BROMIDE AND ALBUTEROL SULFATE 3 ML: .5; 3 SOLUTION RESPIRATORY (INHALATION) at 11:58

## 2020-01-01 RX ADMIN — CEFTRIAXONE SODIUM 2 G: 2 INJECTION, POWDER, FOR SOLUTION INTRAMUSCULAR; INTRAVENOUS at 04:49

## 2020-01-01 RX ADMIN — ALLOPURINOL 100 MG: 100 TABLET ORAL at 05:04

## 2020-01-01 RX ADMIN — Medication 500 MCG: at 10:28

## 2020-01-01 RX ADMIN — ACETAMINOPHEN 650 MG: 325 TABLET, FILM COATED ORAL at 18:12

## 2020-01-01 RX ADMIN — LOSARTAN POTASSIUM 25 MG: 25 TABLET, FILM COATED ORAL at 16:46

## 2020-01-01 RX ADMIN — AMIODARONE HYDROCHLORIDE 0.5 MG/MIN: 50 INJECTION, SOLUTION INTRAVENOUS at 23:35

## 2020-01-01 RX ADMIN — ALLOPURINOL 100 MG: 100 TABLET ORAL at 17:04

## 2020-01-01 RX ADMIN — MIDAZOLAM HYDROCHLORIDE 2 MG: 1 INJECTION, SOLUTION INTRAMUSCULAR; INTRAVENOUS at 02:37

## 2020-01-01 RX ADMIN — DOXYCYCLINE 100 MG: 100 TABLET ORAL at 05:07

## 2020-01-01 RX ADMIN — Medication 500 MG: at 05:08

## 2020-01-01 RX ADMIN — POTASSIUM BICARBONATE 25 MEQ: 978 TABLET, EFFERVESCENT ORAL at 17:12

## 2020-01-01 RX ADMIN — SODIUM CHLORIDE SOLN NEBU 3% 3 ML: 3 NEBU SOLN at 10:07

## 2020-01-01 RX ADMIN — FENTANYL CITRATE 100 MCG: 0.05 INJECTION, SOLUTION INTRAMUSCULAR; INTRAVENOUS at 03:50

## 2020-01-01 RX ADMIN — FAMOTIDINE 20 MG: 20 TABLET ORAL at 17:04

## 2020-01-01 RX ADMIN — NOREPINEPHRINE BITARTRATE 10 MCG/MIN: 1 INJECTION INTRAVENOUS at 00:24

## 2020-01-01 RX ADMIN — AMIODARONE HYDROCHLORIDE 200 MG: 200 TABLET ORAL at 05:29

## 2020-01-01 RX ADMIN — ALLOPURINOL 100 MG: 100 TABLET ORAL at 05:22

## 2020-01-01 RX ADMIN — POTASSIUM BICARBONATE 25 MEQ: 978 TABLET, EFFERVESCENT ORAL at 05:22

## 2020-01-01 RX ADMIN — LOSARTAN POTASSIUM 25 MG: 25 TABLET, FILM COATED ORAL at 18:23

## 2020-01-01 RX ADMIN — NOREPINEPHRINE BITARTRATE 10 MCG/MIN: 1 INJECTION INTRAVENOUS at 03:36

## 2020-01-01 RX ADMIN — MAGNESIUM SULFATE 2 G: 2 INJECTION INTRAVENOUS at 11:00

## 2020-01-01 RX ADMIN — ETOMIDATE 20 MG: 2 INJECTION INTRAVENOUS at 01:48

## 2020-01-01 RX ADMIN — FENTANYL CITRATE 50 MCG: 0.05 INJECTION, SOLUTION INTRAMUSCULAR; INTRAVENOUS at 15:18

## 2020-01-01 RX ADMIN — MIDODRINE HYDROCHLORIDE 5 MG: 5 TABLET ORAL at 18:03

## 2020-01-01 RX ADMIN — FENTANYL CITRATE 100 MCG: 0.05 INJECTION, SOLUTION INTRAMUSCULAR; INTRAVENOUS at 09:40

## 2020-01-01 RX ADMIN — FENTANYL CITRATE 100 MCG: 0.05 INJECTION, SOLUTION INTRAMUSCULAR; INTRAVENOUS at 02:38

## 2020-01-01 RX ADMIN — FUROSEMIDE 20 MG: 10 INJECTION, SOLUTION INTRAMUSCULAR; INTRAVENOUS at 05:08

## 2020-01-01 RX ADMIN — FENTANYL CITRATE 100 MCG: 0.05 INJECTION, SOLUTION INTRAMUSCULAR; INTRAVENOUS at 12:00

## 2020-01-01 ASSESSMENT — CHA2DS2 SCORE
PRIOR STROKE OR TIA OR THROMBOEMBOLISM: NO
AGE 75 OR GREATER: YES
HYPERTENSION: YES
SEX: FEMALE
CHF OR LEFT VENTRICULAR DYSFUNCTION: YES
VASCULAR DISEASE: YES
DIABETES: NO
AGE 65 TO 74: NO
PRIOR STROKE OR TIA OR THROMBOEMBOLISM: NO
CHA2DS2 VASC SCORE: 6
CHF OR LEFT VENTRICULAR DYSFUNCTION: YES
AGE 65 TO 74: NO
CHA2DS2 VASC SCORE: 5
AGE 75 OR GREATER: YES
VASCULAR DISEASE: NO
HYPERTENSION: YES
SEX: FEMALE
DIABETES: NO

## 2020-01-01 ASSESSMENT — ENCOUNTER SYMPTOMS
TINGLING: 0
VOMITING: 0
POLYDIPSIA: 0
BRUISES/BLEEDS EASILY: 0
NERVOUS/ANXIOUS: 0
NAUSEA: 0
SPUTUM PRODUCTION: 1
ABDOMINAL PAIN: 0
WEAKNESS: 0
FEVER: 0
HALLUCINATIONS: 0
DEPRESSION: 0
SHORTNESS OF BREATH: 0
CONSTIPATION: 0
NECK PAIN: 0
DIARRHEA: 1
DIAPHORESIS: 0
WEAKNESS: 0
MYALGIAS: 0
BLOOD IN STOOL: 0
CHILLS: 0
WHEEZING: 0
FOCAL WEAKNESS: 0
PALPITATIONS: 0
FALLS: 0
STRIDOR: 0
ABDOMINAL PAIN: 0
SENSORY CHANGE: 0
DIZZINESS: 0
TREMORS: 0
SINUS PAIN: 0
SORE THROAT: 0
HEADACHES: 0
INSOMNIA: 0
VOMITING: 0
PND: 0
ORTHOPNEA: 0
COUGH: 1
DIZZINESS: 0
COUGH: 0
DEPRESSION: 0
TINGLING: 0
BLURRED VISION: 0
BACK PAIN: 0
CLAUDICATION: 0
SHORTNESS OF BREATH: 1
SORE THROAT: 1
STRIDOR: 0
MYALGIAS: 0
CONSTIPATION: 0
EYE PAIN: 0
DOUBLE VISION: 0
BLURRED VISION: 0
ABDOMINAL PAIN: 0
FLANK PAIN: 0
BACK PAIN: 0
PHOTOPHOBIA: 0
HEMOPTYSIS: 0
SHORTNESS OF BREATH: 0
HEARTBURN: 0
FEVER: 0
WHEEZING: 0
ORTHOPNEA: 0
SPUTUM PRODUCTION: 0
EYE PAIN: 0
HEADACHES: 0
NAUSEA: 0

## 2020-01-01 ASSESSMENT — FIBROSIS 4 INDEX
FIB4 SCORE: 2.52
FIB4 SCORE: 2.83
FIB4 SCORE: 1.8
FIB4 SCORE: 3.27
FIB4 SCORE: 1.77
FIB4 SCORE: 3.02
FIB4 SCORE: 3.27
FIB4 SCORE: 3.04
FIB4 SCORE: 2.139557899725883757
FIB4 SCORE: 3.39
FIB4 SCORE: 2.7

## 2020-01-01 ASSESSMENT — COGNITIVE AND FUNCTIONAL STATUS - GENERAL
TOILETING: A LITTLE
DAILY ACTIVITIY SCORE: 16
MOVING TO AND FROM BED TO CHAIR: UNABLE
STANDING UP FROM CHAIR USING ARMS: TOTAL
MOBILITY SCORE: 6
SUGGESTED CMS G CODE MODIFIER DAILY ACTIVITY: CK
CLIMB 3 TO 5 STEPS WITH RAILING: TOTAL
DRESSING REGULAR LOWER BODY CLOTHING: A LOT
DAILY ACTIVITIY SCORE: 16
DRESSING REGULAR LOWER BODY CLOTHING: A LOT
MOVING FROM LYING ON BACK TO SITTING ON SIDE OF FLAT BED: A LOT
SUGGESTED CMS G CODE MODIFIER DAILY ACTIVITY: CK
PERSONAL GROOMING: A LITTLE
STANDING UP FROM CHAIR USING ARMS: A LITTLE
TURNING FROM BACK TO SIDE WHILE IN FLAT BAD: A LITTLE
HELP NEEDED FOR BATHING: A LOT
PERSONAL GROOMING: A LITTLE
TURNING FROM BACK TO SIDE WHILE IN FLAT BAD: UNABLE
WALKING IN HOSPITAL ROOM: A LITTLE
TOILETING: A LITTLE
MOBILITY SCORE: 14
MOVING FROM LYING ON BACK TO SITTING ON SIDE OF FLAT BED: UNABLE
CLIMB 3 TO 5 STEPS WITH RAILING: A LOT
DRESSING REGULAR UPPER BODY CLOTHING: A LITTLE
WALKING IN HOSPITAL ROOM: TOTAL
SUGGESTED CMS G CODE MODIFIER MOBILITY: CL
DRESSING REGULAR UPPER BODY CLOTHING: A LITTLE
MOVING TO AND FROM BED TO CHAIR: UNABLE
EATING MEALS: A LITTLE
EATING MEALS: A LITTLE
SUGGESTED CMS G CODE MODIFIER MOBILITY: CN
HELP NEEDED FOR BATHING: A LOT

## 2020-01-01 ASSESSMENT — LIFESTYLE VARIABLES
HAVE PEOPLE ANNOYED YOU BY CRITICIZING YOUR DRINKING: NO
HOW MANY TIMES IN THE PAST YEAR HAVE YOU HAD 5 OR MORE DRINKS IN A DAY: 0
SUBSTANCE_ABUSE: 0
ON A TYPICAL DAY WHEN YOU DRINK ALCOHOL HOW MANY DRINKS DO YOU HAVE: 0
TOTAL SCORE: 0
CONSUMPTION TOTAL: NEGATIVE
HAVE YOU EVER FELT YOU SHOULD CUT DOWN ON YOUR DRINKING: NO
TOTAL SCORE: 0
EVER HAD A DRINK FIRST THING IN THE MORNING TO STEADY YOUR NERVES TO GET RID OF A HANGOVER: NO
AVERAGE NUMBER OF DAYS PER WEEK YOU HAVE A DRINK CONTAINING ALCOHOL: 0
EVER FELT BAD OR GUILTY ABOUT YOUR DRINKING: NO
ALCOHOL_USE: NO
SUBSTANCE_ABUSE: 0
TOTAL SCORE: 0

## 2020-01-01 ASSESSMENT — PULMONARY FUNCTION TESTS
EPAP_CMH2O: 8
FVC: 1
FVC: 1
EPAP_CMH2O: 8
FVC: .8
EPAP_CMH2O: 8
FVC: .5
EPAP_CMH2O: 8
FVC: 1021
EPAP_CMH2O: 8
FVC: .6
FVC: 1.2
EPAP_CMH2O: 8
FVC: 1.1

## 2020-01-01 ASSESSMENT — PATIENT HEALTH QUESTIONNAIRE - PHQ9
9. THOUGHTS THAT YOU WOULD BE BETTER OFF DEAD, OR OF HURTING YOURSELF: NOT AT ALL
7. TROUBLE CONCENTRATING ON THINGS, SUCH AS READING THE NEWSPAPER OR WATCHING TELEVISION: NOT AT ALL
2. FEELING DOWN, DEPRESSED, IRRITABLE, OR HOPELESS: NOT AT ALL
SUM OF ALL RESPONSES TO PHQ QUESTIONS 1-9: 2
3. TROUBLE FALLING OR STAYING ASLEEP OR SLEEPING TOO MUCH: NOT AT ALL
1. LITTLE INTEREST OR PLEASURE IN DOING THINGS: SEVERAL DAYS
8. MOVING OR SPEAKING SO SLOWLY THAT OTHER PEOPLE COULD HAVE NOTICED. OR THE OPPOSITE, BEING SO FIGETY OR RESTLESS THAT YOU HAVE BEEN MOVING AROUND A LOT MORE THAN USUAL: NOT AT ALL
SUM OF ALL RESPONSES TO PHQ9 QUESTIONS 1 AND 2: 1
5. POOR APPETITE OR OVEREATING: NOT AT ALL
6. FEELING BAD ABOUT YOURSELF - OR THAT YOU ARE A FAILURE OR HAVE LET YOURSELF OR YOUR FAMILY DOWN: NOT AL ALL
4. FEELING TIRED OR HAVING LITTLE ENERGY: SEVERAL DAYS

## 2020-01-01 ASSESSMENT — ACTIVITIES OF DAILY LIVING (ADL): TOILETING: UNABLE TO DETERMINE AT THIS TIME

## 2020-01-01 ASSESSMENT — GAIT ASSESSMENTS: GAIT LEVEL OF ASSIST: UNABLE TO PARTICIPATE

## 2020-05-01 PROBLEM — G93.40 ACUTE ENCEPHALOPATHY: Status: ACTIVE | Noted: 2020-01-01

## 2020-05-01 PROBLEM — J96.01 ACUTE RESPIRATORY FAILURE WITH HYPOXIA AND HYPERCAPNIA (HCC): Status: ACTIVE | Noted: 2020-01-01

## 2020-05-01 PROBLEM — R19.7 DIARRHEA: Status: ACTIVE | Noted: 2020-01-01

## 2020-05-01 PROBLEM — I35.0 AORTIC STENOSIS: Status: ACTIVE | Noted: 2020-01-01

## 2020-05-01 PROBLEM — N17.9 ACUTE RENAL FAILURE (ARF) (HCC): Status: ACTIVE | Noted: 2020-01-01

## 2020-05-01 PROBLEM — I50.33 ACUTE ON CHRONIC DIASTOLIC HEART FAILURE (HCC): Status: ACTIVE | Noted: 2020-01-01

## 2020-05-01 PROBLEM — R57.9 SHOCK (HCC): Status: ACTIVE | Noted: 2020-01-01

## 2020-05-01 PROBLEM — J96.02 ACUTE RESPIRATORY FAILURE WITH HYPOXIA AND HYPERCAPNIA (HCC): Status: ACTIVE | Noted: 2020-01-01

## 2020-05-01 NOTE — ED NOTES
Unable to completed med rec at this time   Unable to reach pt   Pharmacy in pt's chart ([facility]) states that pt has not filled there in about 2 years

## 2020-05-01 NOTE — ED PROVIDER NOTES
ED Provider Note    CHIEF COMPLAINT  Chief Complaint   Patient presents with   • Shortness of Breath     transfer from [facility] for SOB and new onset renal failure    Shortness of breath and leg swelling    HPI   Grace Leavitt is a 80 y.o. female who presents shortness of breath and leg swelling.  Patient states she was recently started on Lasix.  Since then she has had leg swelling and diarrhea and shortness of breath.  Patient's states she is getting worse not better.  She denies chest pain.  She denies abdominal pain.  Her weight is gone up almost 10 pounds over the last month.    REVIEW OF SYSTEMS  See HPI for further details.  No chest pain positive dyspnea.  Positive diarrhea.  No nausea or vomiting.  No abdominal pain.  All other systems are negative.    PAST MEDICAL HISTORY  Past Medical History:   Diagnosis Date   • Acute hemorrhagic cystitis    • Aortic stenosis    • Arthritis     osteoarthritis   • Asthma    • ASTHMA 1/22/2013   • BMI 38.0-38.9,adult    • Cataract    • Cellulitis    • CHF (congestive heart failure) (HCC)    • Chronic UTI 1/22/2013   • Gout    • GOUT 1/22/2013   • HTN (hypertension)    • HTN, goal below 130/80 1/22/2013   • Hypertension    • Joint pain     knees and back   • RBBB    • S/P left oophorectomy     cysts       FAMILY HISTORY  Family History   Problem Relation Age of Onset   • Heart Disease Mother         PSVT   • Alcohol/Drug Father    • Other Father         spinal stenosis   • Heart Disease Sister 50        MI   • Diabetes Sister    • Other Sister         fibromyalgia   • Cancer Maternal Grandmother         breast CA, Melanoma   • Heart Disease Maternal Grandmother         CAD       SOCIAL HISTORY  Social History     Socioeconomic History   • Marital status:      Spouse name: Not on file   • Number of children: Not on file   • Years of education: Not on file   • Highest education level: Not on file   Occupational History   • Not on file   Social Needs   •  Financial resource strain: Not on file   • Food insecurity     Worry: Not on file     Inability: Not on file   • Transportation needs     Medical: Not on file     Non-medical: Not on file   Tobacco Use   • Smoking status: Never Smoker   • Smokeless tobacco: Never Used   Substance and Sexual Activity   • Alcohol use: No   • Drug use: No   • Sexual activity: Not Currently     Partners: Male     Comment:    Lifestyle   • Physical activity     Days per week: Not on file     Minutes per session: Not on file   • Stress: Not on file   Relationships   • Social connections     Talks on phone: Not on file     Gets together: Not on file     Attends [employer] service: Not on file     Active member of club or organization: Not on file     Attends meetings of clubs or organizations: Not on file     Relationship status: Not on file   • Intimate partner violence     Fear of current or ex partner: Not on file     Emotionally abused: Not on file     Physically abused: Not on file     Forced sexual activity: Not on file   Other Topics Concern   • Not on file   Social History Narrative   • Not on file       SURGICAL HISTORY  Past Surgical History:   Procedure Laterality Date   • EYE LESION EXCISION Left 7/27/2016    Procedure: EYE LESION EXCISION - MEDIAL CANTHUS;  Surgeon: Jm Mays M.D.;  [facility];  Service:    • FLAP CLOSURE Left 7/27/2016    Procedure: FLAP CLOSURE;  Surgeon: Jm Mays M.D.;  Location: [facility];  Service:    • LUMBAR DECOMPRESSION  2009   • CHOLECYSTECTOMY  2004   • SALPINGO-OOPHORECTOMY, UNILATERAL  2001   • ANKLE ARTHROSCOPY  1996   • OTHER  1966    ganglion cyst removed -wrist   • WRIST ORIF  1953   • TONSILLECTOMY  1948       CURRENT MEDICATIONS  Home Medications    **Home medications have not yet been reviewed for this encounter**         ALLERGIES  Allergies   Allergen Reactions   • Keflex Vomiting   • Macrobid [Nitrofurantoin Monohydrate  "Macrocrystals] Hives   • Minocin [Kdc:Yellow Dye+Edetic Acid+Minocycline+Sodium Benzoate+Brilliant Blue Fcf] Hives       PHYSICAL EXAM  VITAL SIGNS: /58   Pulse 66   Resp 20   Ht 1.575 m (5' 2\")   Wt 104.3 kg (230 lb)   SpO2 96%   BMI 42.07 kg/m²   Constitutional: Pleasant elderly female.  Severely weak.  Difficulty sitting up  HENT: Normocephalic, Atraumatic, Bilateral external ears normal, Oropharynx moist,   Eyes: BETO, EOMI, Conjunctiva normal, No discharge.   Neck: Normal range of motion, No tenderness, Supple, No stridor. No nuchal rigidity  Lymphatic: No lymphadenopathy noted.   Cardiovascular: Regular rate and rhythm with murmur  Thorax & Lungs: Rales bilateral bases  Abdomen: Bowel sounds normal, Soft, No tenderness, No masses, No pulsatile masses.   Skin: Warm, Dry, No erythema, No rash.   Back: No tenderness, No CVA tenderness.   Extremities:  No cyanosis, No clubbing. Dorsalis pedis pulses 2+ equal bilaterally. Radial pulses 2+ equal bilaterally.  2+ pitting edema bilaterally  Neurologic: Alert & oriented x 3, Normal motor function, Normal sensory function, No focal deficits noted.     EKG  Sinus rhythm at a rate of 67.  Normal P waves.  Abnormal QRS with a right bundle-branch block and left posterior fascicular block.    RADIOLOGY/PROCEDURES  No orders to display   Chest x-ray done at the outside facility showed pulmonary vascular congestion borderline with emphysematous changes      Lactic acid was unremarkable.  BNP was near 800.  Troponin was 0.08    Labwork:Blood work was obtained at the outside facility.  Her white count was 12 hematocrit was 48.  Her BUN was 137 and creatinine was 2.66 where it was normal 1 month ago.    Results for orders placed or performed during the hospital encounter of 05/01/20   EKG   Result Value Ref Range    Report       [facility] Emergency Dept.    Test Date:  2020-05-01  Pt Name:    CARLOS CARRANZA                 Department: ER  MRN:       "  [id number]                      Room:       [facility]  Gender:     Female                       Technician: [id number]  :        [id number]                   Requested By:SAM ELLER  Order #:    [id number]                    Reading MD:    Measurements  Intervals                                Axis  Rate:       67                           P:          -4  DE:         216                          QRS:        102  QRSD:       156                          T:          -37  QT:         448  QTc:        473    Interpretive Statements  SINUS RHYTHM  ATRIAL PREMATURE COMPLEX  BORDERLINE AV CONDUCTION DELAY  RBBB AND LPFB  Compared to ECG 07/15/2016 12:51:34  Atrial premature complex(es) now present  Left posterior fascicular block now present           COURSE & MEDICAL DECISION MAKING  Pertinent Labs & Imaging studies reviewed. (See chart for details)  [facility]  She has both heart failure and renal failure.      FINAL IMPRESSION  1.  CHF  2.  Chronic renal insufficiency  3.      Please note that this dictation was created using voice recognition software. I have worked with consultants from the vendor as well as technical experts from [facility] to optimize the interface. I have made every reasonable attempt to correct obvious errors, but I expect that there are errors of grammar and possibly content that I did not discover before finalizing the note.      Electronically signed by: Sam Eller M.D., 2020 2:30 PM

## 2020-05-01 NOTE — H&P
Hospital Medicine History & Physical Note    Date of Service  5/1/2020    Primary Care Physician  Litzy Dean P.A.-C.    Code Status  Full Code    Chief Complaint  Chief Complaint   Patient presents with   • Shortness of Breath     transfer from [facility] for SOB and new onset renal failure        History of Presenting Illness  80 y.o. female who presented 5/1/2020 with past medical history of hypertension, CHF, aortic stenosis comes into the emergency room with complaints of shortness of breath for the past 5 days.  Patient states that the shortness of breath be worse when she lays down flat and on exertion.  It is associated with diarrhea for the past 3 days this watery and feels like everything runs through me.  She denies any chest pain, bloody diarrhea, abdominal pain, fever, chills, nausea, vomiting, changes in urine.  Patient was recently started on Lasix 1 month ago but has 4 kg weight increase and lower extremity edema.    I reviewed the records from [facility] that found  WBC 12.1, RBC 5.6, hemoglobin 14.9, neutrophils 9.2, lymphocytes 1.6, glucose 111, , creatinine 2.66, sodium 136, potassium 4.5, chloride 94, CO2 30, anion gap 12, calcium 8.8, AST 35, ALT 30, troponin 0.08    Chest x-ray found evidence of peribronchial cuffing and increased intravascular congestion consistent with pulmonary edema with no focal infiltrate  EKG interpreted by me found normal sinus rhythm with right bundle branch block, left posterior fascicular block    COVID was ruled out in the ER given the high acuity of the patient and presentation I suggest to retest her.    Review of Systems  Review of Systems   Constitutional: Negative for chills, diaphoresis, fever and malaise/fatigue.   HENT: Negative for congestion, ear discharge, ear pain, hearing loss, nosebleeds, sinus pain, sore throat and tinnitus.    Eyes: Negative for blurred vision, double vision, photophobia and pain.   Respiratory:  Positive for shortness of breath. Negative for cough, hemoptysis, sputum production, wheezing and stridor.    Cardiovascular: Negative for chest pain, palpitations, orthopnea, claudication, leg swelling and PND.   Gastrointestinal: Positive for diarrhea. Negative for abdominal pain, blood in stool, constipation, heartburn, melena, nausea and vomiting.   Genitourinary: Negative for dysuria, flank pain, frequency, hematuria and urgency.   Musculoskeletal: Negative for back pain, falls, joint pain, myalgias and neck pain.   Skin: Negative for itching and rash.   Neurological: Negative for dizziness, tingling, tremors, weakness and headaches.   Endo/Heme/Allergies: Negative for environmental allergies and polydipsia. Does not bruise/bleed easily.   Psychiatric/Behavioral: Negative for depression, hallucinations, substance abuse and suicidal ideas.       Past Medical History   has a past medical history of Acute hemorrhagic cystitis, Aortic stenosis, Arthritis, Asthma, ASTHMA (1/22/2013), BMI 38.0-38.9,adult, Cataract, Cellulitis, CHF (congestive heart failure) ([facility]), Chronic UTI (1/22/2013), Gout, GOUT (1/22/2013), HTN (hypertension), HTN, goal below 130/80 (1/22/2013), Hypertension, Joint pain, RBBB, and S/P left oophorectomy.    Surgical History   has a past surgical history that includes cholecystectomy (2004); tonsillectomy (1948); wrist orif (1953); salpingo-oophorectomy, unilateral (2001); lumbar decompression (2009); other (1966); ankle arthroscopy (1996); eye lesion excision (Left, 7/27/2016); and flap closure (Left, 7/27/2016).     Family History  family history includes Alcohol/Drug in her father; Cancer in her maternal grandmother; Diabetes in her sister; Heart Disease in her maternal grandmother and mother; Heart Disease (age of onset: 50) in her sister; Other in her father and sister.     Social History   reports that she has never smoked. She has never used smokeless tobacco. She reports that she does not  drink alcohol or use drugs.    Allergies  Allergies   Allergen Reactions   • Keflex Vomiting   • Macrobid [Nitrofurantoin Monohydrate Macrocrystals] Hives   • Minocin [Kdc:Yellow Dye+Edetic Acid+Minocycline+Sodium Benzoate+Brilliant Blue Fcf] Hives       Medications  Prior to Admission Medications   Prescriptions Last Dose Informant Patient Reported? Taking?   Multiple Vitamins-Minerals (ALIVE WOMENS ENERGY PO)   Yes No   Sig: Take  by mouth.   Non Formulary Request   Yes No   Sig: Eye promise 1 tab daily   Probiotic Product (ACIDOPHILUS PROBIOTIC BLEND PO)   Yes No   Sig: Take  by mouth.   allopurinol (ZYLOPRIM) 100 MG TABS   No No   Sig: Take 1 Tab by mouth 2 times a day.   ascorbic acid (ASCORBIC ACID) 500 MG TABS   Yes No   Sig: Take 500 mg by mouth every day.     aspirin EC (ECOTRIN) 81 MG TBEC   Yes No   Sig: Take 81 mg by mouth every day.   cyclobenzaprine (FLEXERIL) 10 MG TABS   No No   Sig: Take 1 Tab by mouth 3 times a day as needed for Muscle Spasms.   hydrocodone-acetaminophen (NORCO) 5-325 MG Tab per tablet   Yes No   Sig: Take 1 Tab by mouth every 24 hours as needed. FOR PAIN   indomethacin (INDOCIN) 25 MG CAPS   No No   Sig: Take 1 Cap by mouth 2 times a day, with meals.   losartan (COZAAR) 100 MG TABS   No No   Sig: Take 1 Tab by mouth every day.   metoprolol SR (TOPROL XL) 50 MG TB24   No No   Sig: Take 1 Tab by mouth every day.   olmesartan (BENICAR) 20 MG TABS   No No   Sig: Take 1 Tab by mouth every day.   raloxifene (EVISTA) 60 MG TABS   No No   Sig: Take 1 Tab by mouth every day.      Facility-Administered Medications: None       Physical Exam  Temp:  [36.1 °C (96.9 °F)-36.1 °C (97 °F)] 36.1 °C (96.9 °F)  Pulse:  [61-72] 62  Resp:  [16-22] 19  BP: ()/(45-58) 75/45  SpO2:  [80 %-99 %] 99 %    Physical Exam  Vitals signs and nursing note reviewed.   Constitutional:       General: She is in acute distress.      Appearance: She is ill-appearing and toxic-appearing. She is not diaphoretic.    HENT:      Head: Normocephalic and atraumatic.      Nose: No congestion or rhinorrhea.      Mouth/Throat:      Pharynx: No oropharyngeal exudate or posterior oropharyngeal erythema.   Eyes:      General: No scleral icterus.  Neck:      Musculoskeletal: No neck rigidity or muscular tenderness.      Vascular: Hepatojugular reflux and JVD present. No carotid bruit.   Cardiovascular:      Rate and Rhythm: Normal rate and regular rhythm.      Pulses: Normal pulses.      Heart sounds: Murmur present. No friction rub. No gallop.    Pulmonary:      Effort: Pulmonary effort is normal. No respiratory distress.      Breath sounds: No stridor. Rales present. No wheezing or rhonchi.   Abdominal:      General: Abdomen is flat. There is no distension.      Palpations: There is no mass.      Tenderness: There is no abdominal tenderness. There is no left CVA tenderness, guarding or rebound.      Hernia: No hernia is present.   Musculoskeletal: Normal range of motion.         General: No swelling.      Right lower leg: Edema present.      Left lower leg: Edema present.   Lymphadenopathy:      Cervical: No cervical adenopathy.   Skin:     General: Skin is warm and dry.      Capillary Refill: Capillary refill takes 2 to 3 seconds.      Coloration: Skin is not jaundiced or pale.      Findings: No bruising or erythema.         Laboratory:  Recent Labs     05/01/20  1645   WBC 13.7*   RBC 5.71*   HEMOGLOBIN 15.1   HEMATOCRIT 51.7*   MCV 90.5   MCH 26.4*   MCHC 29.2*   RDW 61.3*   PLATELETCT 143*   MPV 10.5         No results for input(s): ALTSGPT, ASTSGOT, ALKPHOSPHAT, TBILIRUBIN, DBILIRUBIN, GAMMAGT, AMYLASE, LIPASE, ALB, PREALBUMIN, GLUCOSE in the last 72 hours.      No results for input(s): NTPROBNP in the last 72 hours.      No results for input(s): TROPONINT in the last 72 hours.    Imaging:  DX-CHEST-LIMITED (1 VIEW)   Final Result      Mild prominence of the pulmonary interstitium. No lobar consolidation or gross congestive  failure.      OUTSIDE IMAGES-DX CHEST   Final Result      EC-ECHOCARDIOGRAM COMPLETE W/O CONT    (Results Pending)   DX-CHEST-FOR LINE PLACEMENT Perform procedure in: Patient's Room    (Results Pending)   DX-CHEST-FOR LINE PLACEMENT Perform procedure in: Patient's Room    (Results Pending)         Assessment/Plan:    * Acute on chronic diastolic heart failure (HCC)  Assessment & Plan  Decompensated  Secondary to valvular heart disease and hypertension  Acute pulmonary edema found on x-ray  Cardiac echo has been ordered  Patient is hypotensive needs inotropic support  IV Lasix ordered twice daily  Strict ins and outs  Daily weights    Acute renal failure (ARF) ([facility])  Assessment & Plan  Unknown etiology at this point possible cardiorenal versus prerenal from sepsis  IV fluid hydration with normal saline  Monitor BMP and assess response  Avoid IV contrast/nephrotoxins/NSAIDs  Dose adjust meds for decreased GFR  Check urine electrolytes      Acute encephalopathy  Assessment & Plan  Secondary to sepsis and hypercapnia  GCS less than 12-needs intubation    Aortic stenosis  Assessment & Plan  History of  Check cardiac echo for further evaluation    Shock ([facility])  Assessment & Plan  Unknown etiology possible cardiogenic versus sepsis  Check procalcitonin  Levophed ordered to maintain a map above 65    Diarrhea  Assessment & Plan  Unknown etiology at this point  Check C. Difficile  Stool cultures ordered    Acute respiratory failure with hypoxia and hypercapnia (HCC)  Assessment & Plan  Patient was found to be in respiratory distress  ABG foundpH: 7.164 / PCO2 100/ PO2 99  Patient was found to be worsening lethargy and transferred to ICU for intubation    Essential hypertension  Assessment & Plan  Hold home antihypertensives in setting of shock    I spent a total of 35 minutes of non face to face time performing additional research, reviewing medical records from transferring facility, discussing plan of care with other  healthcare providers. Start time: 3 45 pm. End time: 4:20 pm

## 2020-05-02 PROBLEM — A41.9 SEPTIC SHOCK (HCC): Status: ACTIVE | Noted: 2020-01-01

## 2020-05-02 PROBLEM — R65.21 SEPTIC SHOCK (HCC): Status: ACTIVE | Noted: 2020-01-01

## 2020-05-02 PROBLEM — Z20.822 SUSPECTED COVID-19 VIRUS INFECTION: Status: ACTIVE | Noted: 2020-01-01

## 2020-05-02 PROBLEM — J18.9 PNEUMONIA DUE TO INFECTIOUS ORGANISM: Status: ACTIVE | Noted: 2020-01-01

## 2020-05-02 NOTE — ASSESSMENT & PLAN NOTE
This is Septic shock Present on admission  SIRS criteria identified on my evaluation include: Tachycardia, with heart rate greater than 90 BPM, Tachypnea, with respirations greater than 20 per minute and Leukocytosis, with WBC greater than 12,000  Source is likely pneumonia, + diarrhea,  covid ruled out with 3 serial negative studies  Presentation includes: hypervolemic with heart failure, unable to give sepsis bolus  \the patient remains hypotensive with systolic blood pressure less than 90 or MAP less than 65  Hemodynamic support with additional fluids and IV vasopressors as needed to maintain a SBP of 90 or MAP of 65  IV antibiotics as appropriate for source of sepsis  Reassessment: I have reassessed the patient's hemodynamic status    Status post antibiotics  Monitor closely need for restarting ->started zosyn 5/12   Cortisol normal, lactate normal on midodrine

## 2020-05-02 NOTE — PROGRESS NOTES
OVERNIGHT HOSPITALIST:    RAPID RESPONSE CALLED around 7:30 pm. Patient is more lethargic with increasing  Needs of oxygen: from 3 L now up to 8 L oxy-mask. Came to the room to evaluate the patient. Rapid Team by the bedside. Patient is lethargic, but able to answer simple questions after painful stimuli. Oriented to person and place.    Brief Hx: 79 yo F, admitted earlier today for CHF exacerbation (10 lbs weight gain in 1 month) and acute on chronic CKD. Coming from outside facility. She has elevated WBC at 13.7 and and COVID test was done that came back negative. Lasix was held due to hypotension. Most recent BP is 90/57.    Brief Evaluation:  Vitals: T: 36.1   HR: 61   RR 18   BP: 90/57    CV exam: RRR, no murmurs  Pulm: Diffuse rales on bibasilar area, no wheezing. Decrease air entry with use of accessory muscles.  Neuro: Lethargic but responding to painful stimuli, oriented to person and place, denies pain.     Point testing:   ABG: pH: 7.164 / PCO2 : >100/ PO2: 99  STAT Chest xRay is worse compared to the one done on outside facility):   IMPRESSION:   Mild prominence of the pulmonary interstitium. No lobar consolidation or gross congestive failure      Paged Intensivist and discussed with Dr. Latif. Transfering her STAT to ICU where she will need to be intubated.

## 2020-05-02 NOTE — ASSESSMENT & PLAN NOTE
Primarily related to medications, improved, agitation more of an issue now  multifactorial etiology  Limit sedative improving  Delirium like precautions

## 2020-05-02 NOTE — ASSESSMENT & PLAN NOTE
Mainly right heart failure RVSP 70 with reduced function will need sleep study, CTA chest negative for pe  LVH and mild AS  Dry fluid volumes when able  Currently on pressors for RV perfusion  Trend CVP and right sided pressure  Dry fluid volume as tolerate

## 2020-05-02 NOTE — PROGRESS NOTES
Critical aptt called per lab.  appt >240.  Heparin gtt paused at 1500.  Will resume at 1700 with a decrease by large dose down to 950 units per hr per protocol.

## 2020-05-02 NOTE — CARE PLAN
Adult Ventilation Update    Total Vent Days: 2  CMV 22/310/+10/50%    Patient Lines/Drains/Airways Status    Active Airway     Name: Placement date: Placement time: Site: Days:    Airway ETT Oral 7.5  05/01/20 2052   Oral  2                           Plateau Pressure: 20 (05/02/20 1035)  Static Compliance (ml / cm H2O): 41.1 (05/02/20 1512)    Patient failed trials because of    Barriers to SBT Weaning Trial Stopped due to:: Apnea > 30 seconds X 4(Will try again) (05/02/20 1512)  Length of Weaning Trial        Sputum/Suction  Cough: Non Productive (05/02/20 1512)  Sputum Amount: Unable to Evaluate (05/02/20 1512)  Sputum Color: Unable to Evaluate (05/02/20 1512)  Sputum Consistency: Unable to Evaluate (05/02/20 1512)

## 2020-05-02 NOTE — PROGRESS NOTES
RN skin check completed with Mary , RN  Devices in place. 2 PIVs, oxymask.   Skin check under devices, yes.  Skin assessment: Ears pink, blanching, Sacrum and buttock is red and pink from moisture but blanchable. Buttock is blanching. Left groin area has red/pink denuded moisture rash. Bilat feet are boggy. Bilat hands are slightly mottled and dusky.   Wound consult in.   Interventions: skin checks, 2 hours turns, mepilex. Off loading. Powder and inter dry to be used between folds.

## 2020-05-02 NOTE — PROGRESS NOTES
Critical Care Progress Note    Date of admission  5/1/2020    Chief Complaint  80 y.o. female admitted 5/1/2020 with increasing dyspnea, CHF versus pneumonia, intubated 5/1    Hospital Course    80 y.o. female who presented 5/1/2020 with respiratory failure with hypoxia.  She has a hx of hypertension, chf, aortic stenosis and was initially admitted to medical floor.  Per admission note 5 days of sob.  Worse when she lays down.  Diarrhea for 3 days.  No abdomianl pain.  Recently started on lasix outpatient due to lower ext edema and weight gain.  Transferred from [facility].  Labs were significant for renal failure with cr 2.6, troponin 0.08, wbc 12.  She has possible pneumonia on chest xray.  Covid suspected and negative test on admission.  Respiratory failure and transferred to ICU.  She had hypercapnic respiratory failure with hypoxia.  Lethargic. Intubated.  There were purulent secretions on intubation upper airway.  Bedside us showed enlarged right heart, I started on heparin drip. Avoid contrast exam for now given suspected covid of which may be reason for diarrhea.  Weaning o2 on ventilator after needing high peep. Levophed for shock.  Treating for pneumonia.  Given 500 ml IVF after intubation.  She is becoming hypervolemic at this time.  Added vasopressin.  Ongoing agitation despite propofol.  Required arterial line and central line.  No pericardial effusion, adequate LVEF at least moderate to normal function.  Started antibiotics.  Blood cx pending.  Moves all extremities and no focal deficits.        Interval Problem Update  Reviewed last 24 hour events:  RRT last night; intubated  COVID neg x 2 (5/1 and 5/2)  Versed gtt at 6; agitated on propofol?  SR 50 - 60  Art line  Norepi 5  Afebrile  Cortrak, start TF  No diarrhea; C.diff ordered  Heparin gtt 1200  Vent day 2  28/310/10/50 -> dec rate to 22, peep to 8; start SBT  7.53/36/131  C3/doxy  Holding losartin, toprol XL      Review of  Systems  Review of Systems   Unable to perform ROS: Intubated        Vital Signs for last 24 hours   Temp:  [35.3 °C (95.6 °F)-36.8 °C (98.2 °F)] 36.8 °C (98.2 °F)  Pulse:  [] 63  Resp:  [16-29] 28  BP: ()/() 97/40  SpO2:  [6 %-100 %] 97 %    Hemodynamic parameters for last 24 hours       Respiratory Information for the last 24 hours  Vent Mode: APVCMV  Rate (breaths/min): 28  Vt Target (mL): 310  PEEP/CPAP: 10  MAP: 14  Control VTE (exp VT): 308    Physical Exam   Physical Exam  Vitals signs and nursing note reviewed.   Constitutional:       Appearance: She is ill-appearing. She is not diaphoretic.   HENT:      Head: Normocephalic and atraumatic.      Right Ear: External ear normal.      Left Ear: External ear normal.      Nose: No congestion or rhinorrhea.      Mouth/Throat:      Mouth: Mucous membranes are dry.      Pharynx: No oropharyngeal exudate or posterior oropharyngeal erythema.   Eyes:      General: No scleral icterus.     Extraocular Movements: Extraocular movements intact.      Conjunctiva/sclera: Conjunctivae normal.      Pupils: Pupils are equal, round, and reactive to light.   Neck:      Musculoskeletal: Neck supple. No neck rigidity or muscular tenderness.   Cardiovascular:      Rate and Rhythm: Tachycardia present. Rhythm irregular.      Pulses: Normal pulses.      Heart sounds: Normal heart sounds. No murmur.   Pulmonary:      Breath sounds: Rhonchi present. No wheezing.      Comments: Intubated, diminished breath sounds  Abdominal:      General: There is no distension.      Palpations: There is no mass.      Tenderness: There is no abdominal tenderness. There is no guarding.   Musculoskeletal:         General: No swelling or tenderness.      Right lower leg: Edema present.      Left lower leg: Edema present.   Lymphadenopathy:      Cervical: No cervical adenopathy.   Skin:     Coloration: Skin is not jaundiced or pale.      Findings: No bruising, erythema, lesion or rash.    Neurological:      General: No focal deficit present.      Mental Status: She is alert.      Cranial Nerves: No cranial nerve deficit.      Sensory: No sensory deficit.      Motor: No weakness.      Coordination: Coordination normal.      Deep Tendon Reflexes: Reflexes normal.      Comments: Sedate, not following commands         Medications  Current Facility-Administered Medications   Medication Dose Route Frequency Provider Last Rate Last Dose   • cefTRIAXone (ROCEPHIN) 2 g in  mL IVPB  2 g Intravenous Q24HRS Fernandez Latif M.D.   Stopped at 05/02/20 0226   • heparin injection 3,200 Units  3,200 Units Intravenous PRN Fernandez Latif M.D.        And   • heparin infusion 25,000 units in 500 mL 0.45% NACL   Intravenous Continuous Fernandez Latif M.D. 24 mL/hr at 05/02/20 0832 1,200 Units/hr at 05/02/20 0832   • potassium chloride SA (Kdur) tablet 20 mEq  20 mEq Oral BID Thad Valenzuela M.D.   Stopped at 05/02/20 0600   • Pharmacy Consult Request  1 Each Other PHARMACY TO DOSE Thad Valenzuela M.D.       • allopurinol (ZYLOPRIM) tablet 100 mg  100 mg Oral BID Thad Valenzuela M.D.   Stopped at 05/02/20 0600   • ascorbic acid tablet 500 mg  500 mg Oral DAILY Thad Valenzuela M.D.   Stopped at 05/02/20 0600   • aspirin EC (ECOTRIN) tablet 81 mg  81 mg Oral DAILY Thad Valenzuela M.D.   Stopped at 05/02/20 0600   • cyclobenzaprine (FLEXERIL) tablet 10 mg  10 mg Oral TID PRN Thad Valenzuela M.D.       • losartan (COZAAR) tablet 100 mg  100 mg Oral DAILY Thad Valenzuela M.D.   Stopped at 05/01/20 1715   • metoprolol SR (TOPROL XL) tablet 50 mg  50 mg Oral DAILY Thad Valenzuela M.D.   Stopped at 05/01/20 1615   • senna-docusate (PERICOLACE or SENOKOT S) 8.6-50 MG per tablet 2 Tab  2 Tab Oral BID Thad Valenzuela M.D.   Stopped at 05/02/20 0600    And   • polyethylene glycol/lytes (MIRALAX) PACKET 1 Packet  1 Packet Oral QDAY PRN Thad Valenzuela M.D.        And   • magnesium hydroxide (MILK OF MAGNESIA) suspension 30 mL  30 mL Oral QDAY PRN  Thad Valenzuela M.D.        And   • bisacodyl (DULCOLAX) suppository 10 mg  10 mg Rectal QDAY PRN Thad Valenzuela M.D.       • acetaminophen (TYLENOL) tablet 650 mg  650 mg Oral Q6HRS PRN Thad Valenzuela M.D.       • ondansetron (ZOFRAN) syringe/vial injection 4 mg  4 mg Intravenous Q4HRS PRN Thad Valenzuela M.D.       • ondansetron (ZOFRAN ODT) dispertab 4 mg  4 mg Oral Q4HRS PRN Thad Valenzuela M.D.       • norepinephrine (Levophed) infusion 8 mg/250 mL (premix)  0-30 mcg/min Intravenous Continuous Fernandez Latif M.D. 9.4 mL/hr at 05/02/20 0811 5 mcg/min at 05/02/20 0811   • Respiratory Therapy Consult   Nebulization Continuous RT Fernandez Latif M.D.       • ipratropium-albuterol (DUONEB) nebulizer solution  3 mL Nebulization Q2HRS PRN (RT) Fernandez Latif M.D.       • famotidine (PEPCID) tablet 20 mg  20 mg Enteral Tube Q12HRS Fernandez Latif M.D.        Or   • famotidine (PEPCID) injection 20 mg  20 mg Intravenous Q12HRS Fernandez Latif M.D.   20 mg at 05/02/20 0448   • MD Alert...ICU Electrolyte Replacement per Pharmacy   Other PHARMACY TO DOSE Fernandez Latif M.D.       • lidocaine (XYLOCAINE) 1 % injection 1-2 mL  1-2 mL Tracheal Tube Q30 MIN PRN Fernandez Latif M.D.       • fentaNYL (SUBLIMAZE) injection 25 mcg  25 mcg Intravenous Q HOUR PRN Fernandez Latif M.D.        Or   • fentaNYL (SUBLIMAZE) injection 50 mcg  50 mcg Intravenous Q HOUR PRN Fernandez Latif M.D.        Or   • fentaNYL (SUBLIMAZE) injection 100 mcg  100 mcg Intravenous Q HOUR PRN Fernandez Latif M.D.   100 mcg at 05/01/20 2321   • ondansetron (ZOFRAN) syringe/vial injection 4 mg  4 mg Intravenous Q6HRS PRN Fernandez Latif M.D.       • ondansetron (ZOFRAN ODT) dispertab 4 mg  4 mg Oral Q6HRS PRN Fernandez Latif M.D.       • doxycycline (VIBRAMYCIN) 100 mg in  mL IVPB  100 mg Intravenous Q12HRS Fernandez Latif M.D.   Stopped at 05/02/20 0548   • SODIUM CHLORIDE 0.9 % IV SOLN            • SODIUM CHLORIDE 0.9 % IV SOLN            • midazolam (VERSED) premix  125 mg/125 mL NS  0-10 mg/hr Intravenous Continuous Augustine Encarnacion M.D. 6 mL/hr at 05/02/20 0811 6 mg/hr at 05/02/20 0811       Fluids    Intake/Output Summary (Last 24 hours) at 5/2/2020 1025  Last data filed at 5/2/2020 1000  Gross per 24 hour   Intake 1037.51 ml   Output 3300 ml   Net -2262.49 ml       Laboratory  Recent Labs     05/02/20  0054 05/02/20  0756   ISTATAPH 7.349* 7.539*   ISTATAPCO2 66.1* 36.4   ISTATAPO2 337* 131*   ISTATATCO2 38* 32   KIXKHNF2AYN 100* 99   ISTATARTHCO3 36.5* 31.0*   ISTATARTBE 8* 8*   ISTATTEMP 35.7 C 36.6 C   ISTATFIO2 100 60   ISTATSPEC Arterial Arterial   ISTATAPHTC 7.368* 7.546*   KPJOACPQ1KK 331* 128*         Recent Labs     05/01/20  1900 05/01/20  2300 05/02/20  0435   SODIUM  --  137 137   POTASSIUM  --  4.4 4.0   CHLORIDE  --  90* 91*   CO2  --  31 26   BUN  --  133* 115*   CREATININE  --  2.41* 2.08*   MAGNESIUM 3.0* 2.8*  --    PHOSPHORUS  --  7.6*  --    CALCIUM  --  8.7 8.5     Recent Labs     05/01/20  2300 05/02/20  0435   ALTSGPT 22 21   ASTSGOT 26 25   ALKPHOSPHAT 74 75   TBILIRUBIN 0.6 0.7   GLUCOSE 88 80     Recent Labs     05/01/20  1645 05/01/20  2300 05/02/20  0435   WBC 13.7* 10.3 13.7*   NEUTSPOLYS 68.60 70.10 71.00   LYMPHOCYTES 19.60* 18.90* 17.30*   MONOCYTES 10.30 8.60 9.10   EOSINOPHILS 0.70 1.60 2.00   BASOPHILS 0.30 0.30 0.20   ASTSGOT  --  26 25   ALTSGPT  --  22 21   ALKPHOSPHAT  --  74 75   TBILIRUBIN  --  0.6 0.7     Recent Labs     05/01/20  1645 05/01/20  2300 05/02/20  0152 05/02/20  0435 05/02/20  0728   RBC 5.71* 5.08  --  5.01  --    HEMOGLOBIN 15.1 13.6  --  13.5  --    HEMATOCRIT 51.7* 45.4  --  42.8  --    PLATELETCT 143* 127*  --  124*  --    PROTHROMBTM  --   --  15.6*  --   --    APTT  --   --  27.3  --  222.5*   INR  --   --  1.20*  --   --    FERRITIN  --  14.7  --   --   --        Imaging  X-Ray:  I have personally reviewed the images and compared with prior images.  Patchy infiltrates, left lower lobe atelectasis versus infiltrate,  ET tube alexey    Echo 5/2  CONCLUSIONS  No prior study is available for comparison.   Normal left ventricular chamber size.  Left ventricular ejection fraction is visually estimated to be greater   than 75%.  Severely dilated right ventricle.  Reduced right ventricular systolic function.  Mild aortic stenosis.  Estimated right ventricular systolic pressure  is 70 mmHg    Assessment/Plan  * Acute on chronic diastolic heart failure (HCC)  Assessment & Plan  Right heart enlarged with deviated septum on my bedside us  Echo ordered  Given some fluids, on pressors  Avoid contrast with suspicion of covid  Treat with heparin drip,less likely submassive as weaning o2 on vent after intubation  Will diurese once able to tolerate    Acute renal failure (ARF) ([facility])  Assessment & Plan  Likely from poor perfusion from sepsis    Pneumonia due to infectious organism  Assessment & Plan  Purulent secretions on intubation  Possibly from aspiration or cap  Antibiotics  Work up for covid    Suspected COVID-19 virus infection  Assessment & Plan  covid # 1 neg, repeat in 48 hours  Diarrhea may be a sign of covid  Respiratory failure  ab    Acute encephalopathy  Assessment & Plan  multifactorial    Aortic stenosis  Assessment & Plan  Contributory to right heart failure    Septic shock (HCC)  Assessment & Plan  This is Septic shock Present on admission  SIRS criteria identified on my evaluation include: Tachycardia, with heart rate greater than 90 BPM, Tachypnea, with respirations greater than 20 per minute and Leukocytosis, with WBC greater than 12,000  Source is likely pneumonia, + diarrhea, suspected covid  Presentation includes: hypervolemic with heart failure, unable to give sepsis bolus  \the patient remains hypotensive with systolic blood pressure less than 90 or MAP less than 65  Hemodynamic support with additional fluids and IV vasopressors as needed to maintain a SBP of 90 or MAP of 65  IV antibiotics as appropriate for source of  sepsis  Reassessment: I have reassessed the patient's hemodynamic status      Diarrhea  Assessment & Plan  ? From covid  Does not seem to have been treated with ab prior      Acute respiratory failure with hypoxia and hypercapnia (HCC)  Assessment & Plan  Right heart failure and sepsis/pneumonia  Suspected covid  On ventilator, intubated 5/1    Essential hypertension  Assessment & Plan  Keep sbp < 160       Updates: Continue full ventilator support, vent adjustments made.  Continue empiric antimicrobial therapy.  Heparin drip, ruling out COVID and evaluate for VTE.  Vascular studies negative for DVT.  No CTA yet with TYRONE and ruling out COVID.  Follow hemodynamics closely, currently on norepinephrine infusion.  Further recommendations to follow.  She is critically ill and at risk for further vital organ deterioration.  VTE:  Heparin  Ulcer: H2 Antagonist  Lines: Central Line  Ongoing indication addressed    I have performed a physical exam and reviewed and updated ROS and Plan today (5/2/2020). In review of yesterday's note (5/1/2020), there are no changes except as documented above.     Discussed patient condition and risk of morbidity and/or mortality with RN, RT, Pharmacy and QA team  The patient remains critically ill.  Critical care time = 36 minutes in directly providing and coordinating critical care and extensive data review.  No time overlap and excludes procedures.

## 2020-05-02 NOTE — PROGRESS NOTES
MD Bhavya at bedside. MD update on patient's condition. RT and charge nurse at bedside to prepare for intubation.

## 2020-05-02 NOTE — ASSESSMENT & PLAN NOTE
Keep SBP < 160 home medication include cozaar and metoprolol  Currently on norepinephrine monitor need for restarting

## 2020-05-02 NOTE — CODE DOCUMENTATION
Dr. Monique contacting Dr. Latif pending ICU transfer based on initial blood gas. RT obtaining second gas now.

## 2020-05-02 NOTE — ASSESSMENT & PLAN NOTE
Initially thought secondary to sepsis, does then treated, currently this is likely secondary to the patient's hypoxia

## 2020-05-02 NOTE — ASSESSMENT & PLAN NOTE
Purulent secretions on intubation  Possibly from aspiration or cap  Antibiotics- cefepime and doxycycline with last day of treatment 5/6  COVID PCR negative daily x3 days-CT scan does not reveal diffuse viral pneumonic process  Cultures negative so far, no sputum culture ever sent?  WBC increased after initiation Decadron, monitor for HCAP

## 2020-05-02 NOTE — ASSESSMENT & PLAN NOTE
Unclear etiology, doubt COVID, improved  Does not seem to have been treated with ABX prior  No bowel movement for the first 4 days, episode of diarrhea 5/5 and stool for C. difficile results negative

## 2020-05-02 NOTE — PROCEDURES
Procedures  Procedure: right internal jugular central line insertion, us guided    : Dr Latif    Reason: septic shock requiring vasopressors, heart failure, hypoxic respiratory failure    The patient's right neck region was prepped and draped in sterile fashion using chlorhexidine scrub. Anesthesia was achieved with 1% lidocaine. The right internal jugular vein was accessed under ultrasound guidance using a finder needle Venous blood was withdrawn and the needle was withdrawn after a guidewire was advanced through the needle catheter. A small incision was made with a blade scalpel and the needle was exchanged for a dilator over the guidewire until appropriate dilation was obtained. The dilator was removed and a central venous triple-lumen catheter was advanced over the guidewire and secured into place with 2 sutures at 16 cm. At time of procedure completion, all ports aspirated and flushed properly. Post-procedure x-ray adequate placement, no pneumothorax.  Sterile procedure done throughout entire procedure by all participating.    Complications: none    Blood loss: < 3 cc

## 2020-05-02 NOTE — PROCEDURES
Procedures  Arterial line, right radial artery, us guided.    Reason: hemodynamic monitoring and respiratory failure with frequent ABG, on vasopressors    Procedure:  Sterile procedure.  Draped appropriately.  Santana test normal.  Right radial artery, US guided.  Needle inserted with flash obtained.  Guidewire inserted through needle, catheter progressed over wire, wire removed, catheter sutured in place with two sutures, chlorhexidine patch and bandage applied.  Connected appropriately.  No complications. Less than 3 cc blood loss.

## 2020-05-02 NOTE — CONSULTS
Critical Care Consultation    Date of consult: 5/1/2020    Referring Physician  Fernandez Latif M.D.    Reason for Consultation  Respiratory failure with hypoxia    History of Presenting Illness  80 y.o. female who presented 5/1/2020 with respiratory failure with hypoxia.  She has a hx of hypertension, chf, aortic stenosis and was initially admitted to medical floor.  Per admission note 5 days of sob.  Worse when she lays down.  Diarrhea for 3 days.  No abdomianl pain.  Recently started on lasix outpatient due to lower ext edema and weight gain.  Transferred from [facility].  Labs were significant for renal failure with cr 2.6, troponin 0.08, wbc 12.  She has possible pneumonia on chest xray.  Covid suspected and negative test on admission.  Respiratory failure and transferred to ICU.  She had hypercapnic respiratory failure with hypoxia.  Lethargic. Intubated.  There were purulent secretions on intubation upper airway.  Bedside us showed enlarged right heart, I started on heparin drip. Avoid contrast exam for now given suspected covid of which may be reason for diarrhea.  Weaning o2 on ventilator after needing high peep. Levophed for shock.  Treating for pneumonia.  Given 500 ml IVF after intubation.  She is becoming hypervolemic at this time.  Added vasopressin.  Ongoing agitation despite propofol.  Required arterial line and central line.  No pericardial effusion, adequate LVEF at least moderate to normal function.  Started antibiotics.  Blood cx pending.  Moves all extremities and no focal deficits.      Code Status  Full Code    Review of Systems  Review of Systems   Unable to perform ROS: Mental status change       Past Medical History   has a past medical history of Acute hemorrhagic cystitis, Aortic stenosis, Arthritis, Asthma, ASTHMA (1/22/2013), BMI 38.0-38.9,adult, Cataract, Cellulitis, CHF (congestive heart failure) (HCC), Chronic UTI (1/22/2013), Gout, GOUT (1/22/2013), HTN (hypertension),  HTN, goal below 130/80 (1/22/2013), Hypertension, Joint pain, RBBB, and S/P left oophorectomy.    Surgical History   has a past surgical history that includes cholecystectomy (2004); tonsillectomy (1948); wrist orif (1953); salpingo-oophorectomy, unilateral (2001); lumbar decompression (2009); other (1966); ankle arthroscopy (1996); eye lesion excision (Left, 7/27/2016); and flap closure (Left, 7/27/2016).    Family History  family history includes Alcohol/Drug in her father; Cancer in her maternal grandmother; Diabetes in her sister; Heart Disease in her maternal grandmother and mother; Heart Disease (age of onset: 50) in her sister; Other in her father and sister.    Social History   reports that she has never smoked. She has never used smokeless tobacco. She reports that she does not drink alcohol or use drugs.    Medications  Home Medications     Reviewed by Martha Holm (Pharmacy Tech) on 05/01/20 at 1553  Med List Status: Unable to Obtain   Medication Last Dose Status   allopurinol (ZYLOPRIM) 100 MG TABS  Active   ascorbic acid (ASCORBIC ACID) 500 MG TABS  Active   aspirin EC (ECOTRIN) 81 MG TBEC  Active   cyclobenzaprine (FLEXERIL) 10 MG TABS  Active   hydrocodone-acetaminophen (NORCO) 5-325 MG Tab per tablet  Active   indomethacin (INDOCIN) 25 MG CAPS  Active   losartan (COZAAR) 100 MG TABS  Active   metoprolol SR (TOPROL XL) 50 MG TB24  Active   Multiple Vitamins-Minerals (ALIVE WOMENS ENERGY PO)  Active   Non Formulary Request  Active   olmesartan (BENICAR) 20 MG TABS  Active   Probiotic Product (ACIDOPHILUS PROBIOTIC BLEND PO)  Active   raloxifene (EVISTA) 60 MG TABS  Active              Current Facility-Administered Medications   Medication Dose Route Frequency Provider Last Rate Last Dose   • furosemide (LASIX) injection 20 mg  20 mg Intravenous BID DIURETIC Thad Valenzuela M.D.   Stopped at 05/01/20 1600   • potassium chloride SA (Kdur) tablet 20 mEq  20 mEq Oral BID Thad Valenzuela M.D.       •  Pharmacy Consult Request  1 Each Other PHARMACY TO DOSE Thad Valenzuela M.D.       • allopurinol (ZYLOPRIM) tablet 100 mg  100 mg Oral BID Thad Valenzuela M.D.       • [START ON 5/2/2020] ascorbic acid tablet 500 mg  500 mg Oral DAILY Thad Valenzuela M.D.       • [START ON 5/2/2020] aspirin EC (ECOTRIN) tablet 81 mg  81 mg Oral DAILY Thad Valenzuela M.D.       • cyclobenzaprine (FLEXERIL) tablet 10 mg  10 mg Oral TID PRN Thad Valenzuela M.D.       • losartan (COZAAR) tablet 100 mg  100 mg Oral DAILY Thad Valenzuela M.D.   Stopped at 05/01/20 1715   • metoprolol SR (TOPROL XL) tablet 50 mg  50 mg Oral DAILY Thad Valenzuela M.D.   Stopped at 05/01/20 1615   • senna-docusate (PERICOLACE or SENOKOT S) 8.6-50 MG per tablet 2 Tab  2 Tab Oral BID Thad Valenzuela M.D.        And   • polyethylene glycol/lytes (MIRALAX) PACKET 1 Packet  1 Packet Oral QDAY PRN Thad Valenzuela M.D.        And   • magnesium hydroxide (MILK OF MAGNESIA) suspension 30 mL  30 mL Oral QDAY PRN Thad Valenzuela M.D.        And   • bisacodyl (DULCOLAX) suppository 10 mg  10 mg Rectal QDAY PRN Thad Valenzuela M.D.       • acetaminophen (TYLENOL) tablet 650 mg  650 mg Oral Q6HRS PRN Thad Valenzuela M.D.       • ondansetron (ZOFRAN) syringe/vial injection 4 mg  4 mg Intravenous Q4HRS PRN Thad Valenzuela M.D.       • ondansetron (ZOFRAN ODT) dispertab 4 mg  4 mg Oral Q4HRS PRN Thad Valenzuela M.D.           Allergies  Allergies   Allergen Reactions   • Keflex Vomiting   • Macrobid [Nitrofurantoin Monohydrate Macrocrystals] Hives   • Minocin [Kdc:Yellow Dye+Edetic Acid+Minocycline+Sodium Benzoate+Brilliant Blue Fcf] Hives       Vital Signs last 24 hours  Temp:  [36.1 °C (96.9 °F)-36.1 °C (97 °F)] 36.1 °C (96.9 °F)  Pulse:  [61-66] 61  Resp:  [16-20] 18  BP: ()/(50-58) 88/53  SpO2:  [82 %-98 %] 89 %    Physical Exam  Physical Exam  Vitals signs and nursing note reviewed.   Constitutional:       General: She is in acute distress.      Appearance: She is ill-appearing and  toxic-appearing. She is not diaphoretic.   HENT:      Head: Normocephalic and atraumatic.      Right Ear: External ear normal.      Left Ear: External ear normal.      Nose: No congestion or rhinorrhea.      Mouth/Throat:      Mouth: Mucous membranes are dry.      Pharynx: No oropharyngeal exudate or posterior oropharyngeal erythema.   Eyes:      General: No scleral icterus.     Extraocular Movements: Extraocular movements intact.      Conjunctiva/sclera: Conjunctivae normal.      Pupils: Pupils are equal, round, and reactive to light.   Neck:      Musculoskeletal: Neck supple. No neck rigidity or muscular tenderness.   Cardiovascular:      Rate and Rhythm: Tachycardia present. Rhythm irregular.      Pulses: Normal pulses.      Heart sounds: Normal heart sounds. No murmur.   Pulmonary:      Effort: Respiratory distress present.      Breath sounds: Rhonchi present. No wheezing.   Abdominal:      General: There is no distension.      Palpations: There is no mass.      Tenderness: There is no abdominal tenderness. There is no guarding.   Musculoskeletal:         General: No swelling or tenderness.      Right lower leg: Edema present.      Left lower leg: Edema present.   Lymphadenopathy:      Cervical: No cervical adenopathy.   Skin:     Coloration: Skin is not jaundiced or pale.      Findings: No bruising, erythema, lesion or rash.   Neurological:      General: No focal deficit present.      Mental Status: She is alert. She is disoriented.      Cranial Nerves: No cranial nerve deficit.      Sensory: No sensory deficit.      Motor: No weakness.      Coordination: Coordination normal.      Deep Tendon Reflexes: Reflexes normal.         Fluids  No intake or output data in the 24 hours ending 05/01/20 2001    Laboratory  Recent Results (from the past 48 hour(s))   EKG    Collection Time: 05/01/20  2:28 PM   Result Value Ref Range    Report       [facility] Emergency Dept.    Test Date:  2020-05-01  Pt  Name:    CARLOS CARRANZA                 Department: ER  MRN:        [id number]                      Room:       [facility]  Gender:     Female                       Technician: [id number]  :        [id number]                   Requested By:SAM GARDNER  Order #:    [id number]                    Saurabh CORDOVA:    Measurements  Intervals                                Axis  Rate:       67                           P:          -4  SD:         216                          QRS:        102  QRSD:       156                          T:          -37  QT:         448  QTc:        473    Interpretive Statements  SINUS RHYTHM  ATRIAL PREMATURE COMPLEX  BORDERLINE AV CONDUCTION DELAY  RBBB AND LPFB  Compared to ECG 07/15/2016 12:51:34  Atrial premature complex(es) now present  Left posterior fascicular block now present     COVID/SARS CoV-2    Collection Time: 20  4:45 PM   Result Value Ref Range    COVID Order Status Received    CBC WITH DIFFERENTIAL    Collection Time: 20  4:45 PM   Result Value Ref Range    WBC 13.7 (H) 4.8 - 10.8 K/uL    RBC 5.71 (H) 4.20 - 5.40 M/uL    Hemoglobin 15.1 12.0 - 16.0 g/dL    Hematocrit 51.7 (H) 37.0 - 47.0 %    MCV 90.5 81.4 - 97.8 fL    MCH 26.4 (L) 27.0 - 33.0 pg    MCHC 29.2 (L) 33.6 - 35.0 g/dL    RDW 61.3 (H) 35.9 - 50.0 fL    Platelet Count 143 (L) 164 - 446 K/uL    MPV 10.5 9.0 - 12.9 fL    Neutrophils-Polys 68.60 44.00 - 72.00 %    Lymphocytes 19.60 (L) 22.00 - 41.00 %    Monocytes 10.30 0.00 - 13.40 %    Eosinophils 0.70 0.00 - 6.90 %    Basophils 0.30 0.00 - 1.80 %    Immature Granulocytes 0.50 0.00 - 0.90 %    Nucleated RBC 0.40 /100 WBC    Neutrophils (Absolute) 9.39 (H) 2.00 - 7.15 K/uL    Lymphs (Absolute) 2.68 1.00 - 4.80 K/uL    Monos (Absolute) 1.41 (H) 0.00 - 0.85 K/uL    Eos (Absolute) 0.10 0.00 - 0.51 K/uL    Baso (Absolute) 0.04 0.00 - 0.12 K/uL    Immature Granulocytes (abs) 0.07 0.00 - 0.11 K/uL    NRBC (Absolute) 0.05 K/uL    Hypochromia 1+     Anisocytosis 1+      Microcytosis 1+    LACTIC ACID    Collection Time: 05/01/20  4:45 PM   Result Value Ref Range    Lactic Acid 1.5 0.5 - 2.0 mmol/L   SARS-CoV-2, PCR (In-House)    Collection Time: 05/01/20  4:45 PM   Result Value Ref Range    SARS-CoV-2 Source NP Swab     SARS-CoV-2 by PCR NotDetected NotDetected   PERIPHERAL SMEAR REVIEW    Collection Time: 05/01/20  4:45 PM   Result Value Ref Range    Peripheral Smear Review see below    PLATELET ESTIMATE    Collection Time: 05/01/20  4:45 PM   Result Value Ref Range    Plt Estimation Decreased    MORPHOLOGY    Collection Time: 05/01/20  4:45 PM   Result Value Ref Range    RBC Morphology Present     Polychromia 1+     Poikilocytosis 1+     Ovalocytes 1+    DIFFERENTIAL COMMENT    Collection Time: 05/01/20  4:45 PM   Result Value Ref Range    Comments-Diff see below        Imaging  DX-CHEST-PORTABLE (1 VIEW)   Final Result      Stable chest. Persistent left lower lobe atelectasis or pneumonia.      DX-CHEST-FOR LINE PLACEMENT Perform procedure in: Patient's Room   Final Result      1.  Satisfactory positioning of endotracheal tube tip.      2.  Right IJV central line tip projects in satisfactory position. No pneumothorax.      3.  Unchanged mild central vascular congestion.      4.  Minimal atelectasis or pneumonia in the left lower lobe.      DX-CHEST-LIMITED (1 VIEW)   Final Result      Mild prominence of the pulmonary interstitium. No lobar consolidation or gross congestive failure.      OUTSIDE IMAGES-DX CHEST   Final Result      EC-ECHOCARDIOGRAM COMPLETE W/O CONT    (Results Pending)   DX-CHEST-PORTABLE (1 VIEW)    (Results Pending)       Assessment/Plan  * Acute on chronic diastolic heart failure (HCC)  Assessment & Plan  Right heart enlarged with deviated septum on my bedside us  Echo ordered  Given some fluids, on pressors  Avoid contrast with suspicion of covid  Treat with heparin drip,less likely submassive as weaning o2 on vent after intubation  Will diurese once able to  tolerate    Acute renal failure (ARF) (HCC)  Assessment & Plan  Likely from poor perfusion from sepsis    Pneumonia due to infectious organism  Assessment & Plan  Purulent secretions on intubation  Possibly from aspiration or cap  Antibiotics  Work up for covid    Suspected COVID-19 virus infection  Assessment & Plan  covid # 1 neg, repeat in 48 hours  Diarrhea may be a sign of covid  Respiratory failure  ab    Acute encephalopathy  Assessment & Plan  multifactorial    Aortic stenosis  Assessment & Plan  Contributory to right heart failure    Septic shock (HCC)  Assessment & Plan  This is Septic shock Present on admission  SIRS criteria identified on my evaluation include: Tachycardia, with heart rate greater than 90 BPM, Tachypnea, with respirations greater than 20 per minute and Leukocytosis, with WBC greater than 12,000  Source is likely pneumonia, + diarrhea, suspected covid  Presentation includes: hypervolemic with heart failure, unable to give sepsis bolus  \the patient remains hypotensive with systolic blood pressure less than 90 or MAP less than 65  Hemodynamic support with additional fluids and IV vasopressors as needed to maintain a SBP of 90 or MAP of 65  IV antibiotics as appropriate for source of sepsis  Reassessment: I have reassessed the patient's hemodynamic status      Diarrhea  Assessment & Plan  ? From covid  Does not seem to have been treated with ab prior      Acute respiratory failure with hypoxia and hypercapnia (HCC)  Assessment & Plan  Right heart failure and sepsis/pneumonia  Suspected covid  On ventilator, intubated 5/1    Essential hypertension  Assessment & Plan  Keep sbp < 160      Discussed patient condition and risk of morbidity and/or mortality with Hospitalist, RN, RT, Pharmacy, Code status disscussed and Charge nurse / hot rounds.      The patient remains critically ill.  Respiratory failure requiring intubation for mechanical ventilator.  Propofol drip, transitioned to versed drip  due to bradycardia.  Heparin drip, managing per seqeuntial ptt and adjusting as appropriate.  Levophed for map > 65 and sbp > 90.  Critical care time = 142 minutes in directly providing and coordinating critical care and extensive data review.  No time overlap and excludes procedures.

## 2020-05-02 NOTE — DIETARY
"Nutrition Support Assessment   Day 1 of admit.  Grace Leavitt is a 80 y.o. female with admitting DX of CHF exacerbation (HCC)     Current problem list:  Per ED: Grace Leavitt is a 80 y.o. female who presents shortness of breath and leg swelling.  Patient states she was recently started on Lasix.  Since then she has had leg swelling and diarrhea and shortness of breath.  Patient states she is getting worse not better...  Her weight is gone up almost 10 pounds over the last month.  PMH: Diastolic HF secondary to valvular heart disease and hypertension  Acute renal insufficiency, Diarrhea, Shock  Pt intubated .     Assessment:  Estimated Nutritional Needs based on:   Height: 157.5 cm (5' 2\")  Weight: 94.9 kg (209 lb 3.5 oz)  Ideal Body Weight: 49.9 kg (110 lb)  Percent Ideal Body Weight: 190.2  Body mass index is 38.27 kg/m²., BMI classification: Class II obesity    Calculation/Equation: REE per MSJ = 1384 kcal/day; RMR per PSU (vent L/min 7.7, T max/24 hours 36.9) = 1526 kcal/day (65-70% = 992-1068 kcal/day)  Total Calories / day: 1044 - 1329 (Calories / k - 14)  Total Grams Protein / day: 100 (Grams Protein / kg IBW: 2.0)     Evaluation:   1. Pt is intubated and unable to take PO diet.  2. Gastric Cortrak in place with orders to start TF.  3. Estimated needs based on critical care obesity guidelines (BMI >30). Noted that pt has been weighing ~10 lb above her UBW secondary to fluid retention, so dry weight is probably ~91 kg.  4. Labs: AG 20.0, , Cr 2.08, alb 2.9;  phos 7.6, mag 2.8, CRP 1.56.  5. Meds include levophed @ 3 mcg/min.  6. Moisture associated skin damage noted.   7. BM PTA (diarrhea).  8. Low-calorie, high-protein TF formula is indicated to meet estimated needs.    9. ?Nephrology consult     Malnutrition Risk: None identified @ this time.      Recommendations/Plan:  1. Start Peptamen Intense VHP @ 25 ml/hr and advance per protocol to goal rate 45 ml/hr to provide 1080 kcal, " "99 grams protein, and 907 ml free water per day.  2. Fluids per MD.  3. Will monitor renal function. TF @ goal will provide 1021 mg Na, 1469 mg K+, and 734 mg phos per day, all considered to be low on renal \"diet\".    RD following.   "

## 2020-05-02 NOTE — PROGRESS NOTES
Pt arrives to unit vis RN rapid transport. Pt A&Ox2 and lethargic. Pt HR is SR in 60s. BP cuurently 86/46. Pt placed on NRB mask by RT. MD notified and is on the way to bedside.

## 2020-05-02 NOTE — ASSESSMENT & PLAN NOTE
Likely from poor perfusion from sepsis/cardiac dysfunction  BUN increasing and creatinine close to normal ? Steroids vs pre-renal from left sided   Avoid nephrotoxins as able clinically  Renally dose medications were clinically appropriate  Monitor urine output and serial BMP  Monitoring renal function

## 2020-05-02 NOTE — CODE DOCUMENTATION
Dr. Latif placed call to RRT, updates provided on pt status. Orders to prep ICU room for intubation, 100 propofol, 40 mg rocuronium ordered. Pt attached to [facility] for transport.

## 2020-05-02 NOTE — ASSESSMENT & PLAN NOTE
Decompensated  The patient was attempted to be treated aggressively, she currently is placed on comfort care measures

## 2020-05-02 NOTE — PROGRESS NOTES
Cortrak Placement    Tube Team verified patient name and medical record number prior to tube placement.  Cortrak tube (55 inches, 10 Welsh) placed at 75 cm in right nare.  Per Cortrak picture, tube appears to be in the stomach.  Nursing Instructions: Awaiting KUB to confirm placement before use for medications or feeding. Once placement confirmed, flush tube with 30 ml of water, and then remove and save stylet, in patient medication drawer.

## 2020-05-02 NOTE — ASSESSMENT & PLAN NOTE
Initially intubated 5/, extubation 5/6, failed with stridor and reintubated 5/7, extubated 5/10  Right heart failure and sepsis/pneumonia  Suspected covid initially, PCR for COVID negative x3 now, isolation discontinued  CTA negative for PE, left lower lobe atelectasis/pneumonia suspected, diffuse viral process not seen  Transitioned heparin drip to Xarelto for atrial flutter    Patient with mild hypoxemia monitor closely for need for HFNC or bipap    Placed on Bipap for worsening hypercarbic respiratory failure with serial monitor ABG

## 2020-05-02 NOTE — PROGRESS NOTES
Report received from day shift RN, Blayne. Upon Pt assessment, Pt presents lethargic- only responsive to pain and hypoxic on 3L NC. Switched to oxymask, saturating low 90s and RR called. Dr. Monique at bedside. New orders for ABG and CXR. Pt saturation between 88-93% on 8-13L, borderline HoTN, worsening CXR, and pH 7.16 and CO2>100. Pt transferred to [facility] with RR team.

## 2020-05-02 NOTE — PROCEDURES
Procedures  Procedure: endotracheal intubation    : Dr Latif    Reason: Acute respiratory failure with hypoxia and hypercapnia, sepsis, heart failure    Permit was implied secondary to emergent situation. A MAC 3 blade was inserted into the oropharynx at which time the vocal cords were visualized. A 7.5-Stateless endotracheal tube was inserted and visualized going through the vocal cords. The stylette was removed. Colorimetric change was visualized on the CO2 meter. Breath sounds were heard in both lung fields equally. The endotracheal tube was placed at 24 cm, measured at the lips.  Post procedure cxray adequate placement.     Complications: none

## 2020-05-02 NOTE — ASSESSMENT & PLAN NOTE
covid PCR x3 all negative, intermittently performed daily 5/1, 5/2 and 5/3  Diarrhea may be a sign of covid, improved  Respiratory failure, persist  CTA most consistent with left lower lobe atelectasis and small effusion, query pneumonia there, diffuse viral pneumonia or PE not seen  Okay discontinue isolation

## 2020-05-02 NOTE — WOUND TEAM
Wound consult received for left groin/pannus and sacrum. Pt seen in [id number]. Left groin/pannus assessed. Cleansed area w/ warm wash cloth. Redness appears to have decreased from wound pictures. Does not appear to have yeast like appearance during assessment. Bedside nursing applied barrier paste. Continue to apply barrier paste at this time. Pt turned to left side. Sacrum and coccyx is pink but blanching. Redness appears to have decreased from pictures. Bedside RN applying barrier paste. Continue barrier paste at this time. Wound team signing off. No advanced wound care needs at this time.

## 2020-05-02 NOTE — ASSESSMENT & PLAN NOTE
Unknown etiology possible cardiogenic versus sepsis  Check procalcitonin  Levophed ordered to maintain a map above 65

## 2020-05-03 NOTE — PROGRESS NOTES
"Critical Care Progress Note    Date of admission  5/1/2020    Chief Complaint  80 y.o. female admitted 5/1/2020 with increasing dyspnea, CHF versus pneumonia, severe pulmonary hypertension, intubated 5/1    Hospital Course    \"80 y.o. female who presented 5/1/2020 with respiratory failure with hypoxia.  She has a hx of hypertension, chf, aortic stenosis and was initially admitted to medical floor.  Per admission note 5 days of sob.  Worse when she lays down.  Diarrhea for 3 days.  No abdomianl pain.  Recently started on lasix outpatient due to lower ext edema and weight gain.  Transferred from [facility].  Labs were significant for renal failure with cr 2.6, troponin 0.08, wbc 12.  She has possible pneumonia on chest xray.  Covid suspected and negative test on admission.  Respiratory failure and transferred to ICU.  She had hypercapnic respiratory failure with hypoxia.  Lethargic. Intubated.  There were purulent secretions on intubation upper airway.  Bedside us showed enlarged right heart, I started on heparin drip. Avoid contrast exam for now given suspected covid of which may be reason for diarrhea.  Weaning o2 on ventilator after needing high peep. Levophed for shock.  Treating for pneumonia.  Given 500 ml IVF after intubation.  She is becoming hypervolemic at this time.  Added vasopressin.  Ongoing agitation despite propofol.  Required arterial line and central line.  No pericardial effusion, adequate LVEF at least moderate to normal function.  Started antibiotics.  Blood cx pending.  Moves all extremities and no focal deficits.\" -Dr Bhavya 5/1     5/2 -severe pulmonary pretension noted on echocardiogram, preserved LVEF.  Continuing heparin drip and will hold off on CTA until renal function improved.  Stop Versed drip, PRN pushes of fentanyl if needed and now following some commands.  5/3 -titrated off norepinephrine.   Starting Lasix, off norepinephrine, plan for CTA to rule out PE if renal " function improves tomorrow.      Interval Problem Update  Reviewed last 24 hour events:  Versed gtt off  Now following, moves all ext; int agitation; prn fentanyl  Heparin gtt 800 u/hr  SR, int bigem  SBP 90 - 110  Off norepi  Afebrile, Tm = 99.3  cortrak and anat TF at goal  I/O = 1.7/3.9  CXR LLL inf, ETT OK, mild int edema  Vent day 3  22/310/8/40  7.49/44/90  SBT not anat yesterday; apnea  C3/doxy day 2  Inc K   Start Lasix  CTA tomorrow to r/o PE and ? Stop heparin   COVID PCR Neg 5/1, Neg 5/2 (repeated too soon), repeat today for a 48-hour interval study.  If negative can DC isolation.    Yesterday:   RRT last night; intubated  COVID neg x 2 (5/1 and 5/2)  Versed gtt at 6; agitated on propofol?  SR 50 - 60  Art line  Norepi 5  Afebrile  Cortrak, start TF  No diarrhea; C.diff ordered  Heparin gtt 1200  Vent day 2  28/310/10/50 -> dec rate to 22, peep to 8; start SBT  7.53/36/131  C3/doxy  Holding losartin, toprol XL      Review of Systems  Review of Systems   Unable to perform ROS: Intubated        Vital Signs for last 24 hours   Temp:  [36.7 °C (98.1 °F)] 36.7 °C (98.1 °F)  Pulse:  [60-96] 91  Resp:  [20-28] 22  BP: ()/(40-56) 92/47  SpO2:  [92 %-100 %] 100 %    Hemodynamic parameters for last 24 hours       Respiratory Information for the last 24 hours  Vent Mode: APVCMV  Rate (breaths/min): 22  Vt Target (mL): 310  PEEP/CPAP: 8  MAP: 12  Control VTE (exp VT): 301    Physical Exam   Physical Exam  Vitals signs and nursing note reviewed.   Constitutional:       Appearance: She is ill-appearing. She is not diaphoretic.   HENT:      Head: Normocephalic and atraumatic.      Right Ear: External ear normal.      Left Ear: External ear normal.      Nose: No congestion or rhinorrhea.      Mouth/Throat:      Mouth: Mucous membranes are dry.      Pharynx: No oropharyngeal exudate or posterior oropharyngeal erythema.   Eyes:      General: No scleral icterus.     Extraocular Movements: Extraocular movements intact.       Conjunctiva/sclera: Conjunctivae normal.      Pupils: Pupils are equal, round, and reactive to light.   Neck:      Musculoskeletal: Neck supple. No neck rigidity or muscular tenderness.   Cardiovascular:      Rate and Rhythm: Tachycardia present. Rhythm irregular.      Pulses: Normal pulses.      Heart sounds: Normal heart sounds. No murmur.   Pulmonary:      Breath sounds: Rhonchi present. No wheezing.      Comments: Intubated, diminished breath sounds  Abdominal:      General: There is no distension.      Palpations: There is no mass.      Tenderness: There is no abdominal tenderness. There is no guarding.   Musculoskeletal:         General: No swelling or tenderness.      Right lower leg: Edema present.      Left lower leg: Edema present.   Lymphadenopathy:      Cervical: No cervical adenopathy.   Skin:     Coloration: Skin is not jaundiced or pale.      Findings: No bruising, erythema, lesion or rash.   Neurological:      General: No focal deficit present.      Mental Status: She is alert.      Cranial Nerves: No cranial nerve deficit.      Sensory: No sensory deficit.      Motor: No weakness.      Coordination: Coordination normal.      Deep Tendon Reflexes: Reflexes normal.      Comments: Sedate, now following some commands, withdraws all extremities symmetrically         Medications  Current Facility-Administered Medications   Medication Dose Route Frequency Provider Last Rate Last Dose   • doxycycline monohydrate (ADOXA) tablet 100 mg  100 mg Enteral Tube Q12HRS Vish Carrington M.D.       • cefTRIAXone (ROCEPHIN) 2 g in  mL IVPB  2 g Intravenous Q24HRS Fernandez Latif M.D.   Stopped at 05/03/20 0519   • heparin injection 3,200 Units  3,200 Units Intravenous PRN Fernandez Latif M.D.        And   • heparin infusion 25,000 units in 500 mL 0.45% NACL   Intravenous Continuous Fernandez Latif M.D. 16 mL/hr at 05/03/20 0743 800 Units/hr at 05/03/20 0743   • Pharmacy Consult: Enteral tube insertion - review  meds/change route/product selection  1 Each Other PHARMACY TO DOSE Vish Carrington M.D.       • senna-docusate (PERICOLACE or SENOKOT S) 8.6-50 MG per tablet 2 Tab  2 Tab Enteral Tube BID Vish Carrington M.D.   2 Tab at 05/03/20 0451    And   • polyethylene glycol/lytes (MIRALAX) PACKET 1 Packet  1 Packet Enteral Tube QDAY PRN Vish Carrington M.D.        And   • magnesium hydroxide (MILK OF MAGNESIA) suspension 30 mL  30 mL Enteral Tube QDAY PRN Vish Carrington M.D.        And   • bisacodyl (DULCOLAX) suppository 10 mg  10 mg Rectal QDAY PRN Vish Carrington M.D.       • acetaminophen (TYLENOL) tablet 650 mg  650 mg Enteral Tube Q6HRS PRN Vish Carrington M.D.       • potassium chloride SA (Kdur) tablet 20 mEq  20 mEq Enteral Tube BID Vish Carrington M.D.   20 mEq at 05/03/20 0451   • allopurinol (ZYLOPRIM) tablet 100 mg  100 mg Enteral Tube BID Vish Carrington M.D.   100 mg at 05/03/20 0452   • ondansetron (ZOFRAN ODT) dispertab 4 mg  4 mg Enteral Tube Q6HRS PRN Vish Carrington M.D.       • ascorbic acid tablet 500 mg  500 mg Enteral Tube DAILY Vish Carrington M.D.   500 mg at 05/03/20 0451   • cyclobenzaprine (FLEXERIL) tablet 10 mg  10 mg Enteral Tube TID PRN Vish Carrington M.D.       • aspirin (ASA) chewable tab 81 mg  81 mg Enteral Tube DAILY Vish Carrington M.D.   81 mg at 05/03/20 0451   • Pharmacy Consult Request  1 Each Other PHARMACY TO DOSE Thad Valenzuela M.D.       • norepinephrine (Levophed) infusion 8 mg/250 mL (premix)  0-30 mcg/min Intravenous Continuous Fernandez Latif M.D.   Stopped at 05/02/20 1430   • Respiratory Therapy Consult   Nebulization Continuous RT Fernandez Latif M.D.       • ipratropium-albuterol (DUONEB) nebulizer solution  3 mL Nebulization Q2HRS PRN (RT) Fernandez Latif M.D.       • famotidine (PEPCID) tablet 20 mg  20 mg Enteral Tube Q12HRS Fernandez Latif M.D.   20 mg at 05/03/20 0451    Or   • famotidine (PEPCID) injection 20 mg  20 mg Intravenous Q12HRS Fernandez Latif M.D.   20 mg at  05/02/20 0448   • MD Alert...ICU Electrolyte Replacement per Pharmacy   Other PHARMACY TO DOSE Fernandez Latif M.D.       • lidocaine (XYLOCAINE) 1 % injection 1-2 mL  1-2 mL Tracheal Tube Q30 MIN PRN Fernandez Latif M.D.       • fentaNYL (SUBLIMAZE) injection 25 mcg  25 mcg Intravenous Q HOUR PRN Fernandez Latif M.D.        Or   • fentaNYL (SUBLIMAZE) injection 50 mcg  50 mcg Intravenous Q HOUR PRN Fernandez Latif M.D.        Or   • fentaNYL (SUBLIMAZE) injection 100 mcg  100 mcg Intravenous Q HOUR PRN Fernandez Latif M.D.   100 mcg at 05/03/20 0940   • ondansetron (ZOFRAN) syringe/vial injection 4 mg  4 mg Intravenous Q6HRS PRN Fernandez Latif M.D.       • midazolam (VERSED) premix 125 mg/125 mL NS  0-10 mg/hr Intravenous Continuous Augustine Encarnacion M.D.   Stopped at 05/02/20 1415       Fluids    Intake/Output Summary (Last 24 hours) at 5/3/2020 1000  Last data filed at 5/3/2020 0603  Gross per 24 hour   Intake 1615.75 ml   Output 2450 ml   Net -834.25 ml       Laboratory  Recent Labs     05/02/20  0054 05/02/20  0756 05/03/20  0400   ISTATAPH 7.349* 7.539* 7.495   ISTATAPCO2 66.1* 36.4 44.5*   ISTATAPO2 337* 131* 90*   ISTATATCO2 38* 32 36*   [id number] 100* 99 98   ISTATARTHCO3 36.5* 31.0* 34.3*   ISTATARTBE 8* 8* 10*   ISTATTEMP 35.7 C 36.6 C 37.1 C   ISTATFIO2 100 60 50   ISTATSPEC Arterial Arterial Arterial   ISTATAPHTC 7.368* 7.546* 7.493   [id number] 331* 128* 91*         Recent Labs     05/01/20  1900 05/01/20  2300 05/02/20  0435 05/03/20  0500   SODIUM  --  137 137 142   POTASSIUM  --  4.4 4.0 3.3*   CHLORIDE  --  90* 91* 99   CO2  --  31 26 28   BUN  --  133* 115* 79*   CREATININE  --  2.41* 2.08* 1.42*   MAGNESIUM 3.0* 2.8*  --  2.1   PHOSPHORUS  --  7.6*  --  2.6   CALCIUM  --  8.7 8.5 8.3*     Recent Labs     05/01/20  2300 05/02/20  0435 05/03/20  0500   ALTSGPT 22 21  --    ASTSGOT 26 25  --    ALKPHOSPHAT 74 75  --    TBILIRUBIN 0.6 0.7  --    PREALBUMIN  --   --  11.4*   GLUCOSE 88 80 111*     Recent  Labs     05/01/20  2300 05/02/20  0435 05/03/20  0500   WBC 10.3 13.7* 10.9*   NEUTSPOLYS 70.10 71.00 76.50*   LYMPHOCYTES 18.90* 17.30* 14.00*   MONOCYTES 8.60 9.10 8.00   EOSINOPHILS 1.60 2.00 0.80   BASOPHILS 0.30 0.20 0.20   ASTSGOT 26 25  --    ALTSGPT 22 21  --    ALKPHOSPHAT 74 75  --    TBILIRUBIN 0.6 0.7  --      Recent Labs     05/01/20  2300 05/02/20  0152 05/02/20  0435  05/02/20  1415 05/02/20  2321 05/03/20  0500   RBC 5.08  --  5.01  --   --   --  5.05   HEMOGLOBIN 13.6  --  13.5  --   --   --  13.3   HEMATOCRIT 45.4  --  42.8  --   --   --  43.0   PLATELETCT 127*  --  124*  --   --   --  130*   PROTHROMBTM  --  15.6*  --   --   --   --   --    APTT  --  27.3  --    < > >240.0* 116.8* 93.0*   INR  --  1.20*  --   --   --   --   --    FERRITIN 14.7  --   --   --   --   --   --     < > = values in this interval not displayed.       Imaging  X-Ray:  I have personally reviewed the images and compared with prior images.     Echo 5/2  CONCLUSIONS  No prior study is available for comparison.   Normal left ventricular chamber size.  Left ventricular ejection fraction is visually estimated to be greater   than 75%.  Severely dilated right ventricle.  Reduced right ventricular systolic function.  Mild aortic stenosis.  Estimated right ventricular systolic pressure  is 70 mmHg    Assessment/Plan  * Acute on chronic diastolic heart failure (HCC)  Assessment & Plan  Right heart enlarged with deviated septum on my bedside us  Echo ordered  Given some fluids, on pressors  Avoid contrast with suspicion of covid  Treat with heparin drip,less likely submassive as weaning o2 on vent after intubation  Will diurese once able to tolerate    Acute renal failure (ARF) ([facility])  Assessment & Plan  Likely from poor perfusion from sepsis    Pneumonia due to infectious organism  Assessment & Plan  Purulent secretions on intubation  Possibly from aspiration or cap  Antibiotics  Work up for covid    Suspected COVID-19 virus  infection  Assessment & Plan  covid # 1 neg, repeat in 48 hours  Diarrhea may be a sign of covid  Respiratory failure  ab    Acute encephalopathy  Assessment & Plan  multifactorial    Aortic stenosis  Assessment & Plan  Contributory to right heart failure    Septic shock (HCC)  Assessment & Plan  This is Septic shock Present on admission  SIRS criteria identified on my evaluation include: Tachycardia, with heart rate greater than 90 BPM, Tachypnea, with respirations greater than 20 per minute and Leukocytosis, with WBC greater than 12,000  Source is likely pneumonia, + diarrhea, suspected covid  Presentation includes: hypervolemic with heart failure, unable to give sepsis bolus  \the patient remains hypotensive with systolic blood pressure less than 90 or MAP less than 65  Hemodynamic support with additional fluids and IV vasopressors as needed to maintain a SBP of 90 or MAP of 65  IV antibiotics as appropriate for source of sepsis  Reassessment: I have reassessed the patient's hemodynamic status      Diarrhea  Assessment & Plan  ? From covid  Does not seem to have been treated with ab prior      Acute respiratory failure with hypoxia and hypercapnia (HCC)  Assessment & Plan  Right heart failure and sepsis/pneumonia  Suspected covid  On ventilator, intubated 5/1    Essential hypertension  Assessment & Plan  Keep sbp < 160     Update: Start Lasix today.  Follow renal function closely.  Now off norepinephrine.  Continue empiric antibiotics and follow-up cultures.  48-hour COVID PCR pending today and remove isolation if negative.  I have made further ventilator changes.  She is not yet appropriate for liberation but wean sedation and SAT/SBT as tolerated.  Continue heparin infusion, no evidence of DVT but with RVSP elevated consider CTA to evaluate for PE tomorrow if renal function improved.    VTE:  Heparin  Ulcer: H2 Antagonist  Lines: Central Line  Ongoing indication addressed    I have performed a physical exam and  reviewed and updated ROS and Plan today (5/3/2020). In review of yesterday's note (5/2/2020), there are no changes except as documented above.     Discussed patient condition and risk of morbidity and/or mortality with RN, RT, Pharmacy and QA team  The patient remains critically ill.  Critical care time = 39 minutes in directly providing and coordinating critical care and extensive data review.  No time overlap and excludes procedures.

## 2020-05-03 NOTE — CARE PLAN
Problem: Safety  Goal: Will remain free from injury  Note: Call light within reach, treaded socks in place, bed in lowest position and locked.  Hourly rounding in progress      Problem: Skin Integrity  Goal: Risk for impaired skin integrity will decrease  Note: Skin assessed. Pt turned and repositioned q2, heels floated with pillows, pillow in use to support bony prominences, mepilex in use, waffle cushion in use.

## 2020-05-03 NOTE — CARE PLAN
Adult Ventilation Update    Total Vent Days: 3  CMV 22/310/+8  Patient Lines/Drains/Airways Status    Active Airway     Name: Placement date: Placement time: Site: Days:    Airway ETT Oral 7.5  05/01/20 2052   Oral  3              Plateau Pressure: 20 (05/03/20 1014)  Static Compliance (ml / cm H2O): 39.2 (05/03/20 1410)    Patient failed trials because of    Barriers to SBT Weaning Trial Stopped due to:: RSBI >105 (Rapid Shallow Breathing Index) (05/03/20 1405)  Length of Weaning Trial Length of Weaning Trial (Hours): <5 mins (05/03/20 1405)      Sputum/Suction   Cough: Non Productive (05/03/20 1014)  Sputum Amount: Small (05/03/20 1014)  Sputum Color: White;Yellow (05/03/20 1014)  Sputum Consistency: Thick (05/03/20 1014)        Events/Summary/Plan: Placed pt on spont (05/03/20 1405)

## 2020-05-04 PROBLEM — E87.6 HYPOKALEMIA: Status: ACTIVE | Noted: 2020-01-01

## 2020-05-04 PROBLEM — I48.92 ATRIAL FLUTTER (HCC): Status: ACTIVE | Noted: 2020-01-01

## 2020-05-04 PROBLEM — E83.39 HYPOPHOSPHATEMIA: Status: ACTIVE | Noted: 2020-01-01

## 2020-05-04 PROBLEM — I27.20 PULMONARY HYPERTENSION (HCC): Status: ACTIVE | Noted: 2020-01-01

## 2020-05-04 NOTE — DISCHARGE PLANNING
Care Transition Team Discharge Planning    Anticipated Discharge Disposition: TBD    Action: Lsw noted pt is vented, and has two friends listed as emergency contacts. Lsw did not see any relatives in history.    Lsw spoke to [facility]. There are no known family members calling as of this time.     Lsw completed a request for [employer] search to be completed.     Barriers to Discharge: TBD    Plan: f/u w/ medical team, etc.

## 2020-05-04 NOTE — PROGRESS NOTES
Third Covid swab returned negative result. Dr. Latif updated via [employer] by charge RN.

## 2020-05-04 NOTE — CARE PLAN
Adult Ventilation Update    Total Vent Days: 4    Patient Lines/Drains/Airways Status    Active Airway     Name: Placement date: Placement time: Site: Days:    Airway ETT Oral 7.5  05/01/20 2052   Oral  4           Plateau Pressure: 20 (05/03/20 1014)  Static Compliance (ml / cm H2O): 26.7 (05/04/20 0132)    Sputum/Suction   Cough: Non Productive (05/04/20 0400)  Sputum Amount: Large (05/03/20 2000)  Sputum Color: White;Yellow (05/03/20 2000)  Sputum Consistency: Thick (05/03/20 2000)    Mobility  Level of Mobility: Level IV (05/03/20 2000)  Activity Performed: Unable to mobilize (05/03/20 2000)  Pt Calls for Assistance: No (05/03/20 2000)  Gait: Unable to Ambulate (05/03/20 2000)  Reason Not Mobilized: Unstable condition(RASS +4, pulling at ETT, not redirectable) (05/03/20 2000)  Mobilization Comments: (does not follow) (05/02/20 2000)

## 2020-05-04 NOTE — PROGRESS NOTES
Pt central line continued to ooze blood throughout shift. Sand bag applied and bleeding stopped. Central line dressing changed and pt placed on new sheets.  Will continue to monitor.

## 2020-05-04 NOTE — PROGRESS NOTES
"Critical Care Progress Note    Date of admission  5/1/2020    Chief Complaint  80 y.o. female admitted 5/1/2020 with increasing dyspnea, CHF versus pneumonia, severe pulmonary hypertension, intubated 5/1    Hospital Course    \"80 y.o. female who presented 5/1/2020 with respiratory failure with hypoxia.  She has a hx of hypertension, chf, aortic stenosis and was initially admitted to medical floor.  Per admission note 5 days of sob.  Worse when she lays down.  Diarrhea for 3 days.  No abdomianl pain.  Recently started on lasix outpatient due to lower ext edema and weight gain.  Transferred from [facility].  Labs were significant for renal failure with cr 2.6, troponin 0.08, wbc 12.  She has possible pneumonia on chest xray.  Covid suspected and negative test on admission.  Respiratory failure and transferred to ICU.  She had hypercapnic respiratory failure with hypoxia.  Lethargic. Intubated.  There were purulent secretions on intubation upper airway.  Bedside us showed enlarged right heart, I started on heparin drip. Avoid contrast exam for now given suspected covid of which may be reason for diarrhea.  Weaning o2 on ventilator after needing high peep. Levophed for shock.  Treating for pneumonia.  Given 500 ml IVF after intubation.  She is becoming hypervolemic at this time.  Added vasopressin.  Ongoing agitation despite propofol.  Required arterial line and central line.  No pericardial effusion, adequate LVEF at least moderate to normal function.  Started antibiotics.  Blood cx pending.  Moves all extremities and no focal deficits.\" -Dr Bhavya 5/1     5/2 -severe pulmonary pretension noted on echocardiogram, preserved LVEF.  Continuing heparin drip and will hold off on CTA until renal function improved.  Stop Versed drip, PRN pushes of fentanyl if needed and now following some commands.  5/3 -titrated off norepinephrine.   Starting Lasix, off norepinephrine, plan for CTA to rule out PE if renal " function improves tomorrow.      Interval Problem Update  Reviewed last 24 hour events:    Follows slowly  Agitated - few IV versed pops  Moves all 4 - follows well  SR 90s  SBp 90s  Edema unchanged by report  Afebrile  UO adequate - neg 1800 cc  TF cortrak goal  VentDay  PEEP 8, 40%  RSBI 64, very poor weans  Lasix IV  Heparin drip  Cef/Doxy 3/5  Cultures neg  COVID-19 negative on 5/1, 5/2 and 5/3  Magnesium 1.7, phosphorus 1.9 and potassium 3.4  Creatinine now normal  BMP improving over the weekend    Replete K/Phos/Mg  Mobilize  D/c Versed  PT/OT  CTA chest today, rule out PE, eval pneumonia    CTA chest completed  No evidence of PE  Left greater than right lower lobe atelectasis with minimal effusion   No obvious mass or adenopathy    Serial follow-up  Since patient back from CT heart rate is been elevated and appears to be atrial flutter  Blood pressure remains soft at times  Electrolytes reviewed again with RN from this a.m.  Check EKG  Continue heparin for now, had considered stopping it with negative CTA for pulmonary embolus and no evidence of DVT on noninvasive studies  Midodrine 5 mg every 8 hours  Cardiology consult for pulmonary hypertension and atrial arrhythmias which I suspect will be a chronic problem     YESTERDAY  Versed gtt off  Now following, moves all ext; int agitation; prn fentanyl  Heparin gtt 800 u/hr  SR, int bigem  SBP 90 - 110  Off norepi  Afebrile, Tm = 99.3  cortrak and anat TF at goal  I/O = 1.7/3.9  CXR LLL inf, ETT OK, mild int edema  Vent day 3  22/310/8/40  7.49/44/90  SBT not anat yesterday; apnea  C3/doxy day 2  Inc K   Start Lasix  CTA tomorrow to r/o PE and ? Stop heparin   COVID PCR Neg 5/1, Neg 5/2 (repeated too soon), repeat today for a 48-hour interval study.  If negative can DC isolation.    Review of Systems  Review of Systems   Unable to perform ROS: Intubated        Vital Signs for last 24 hours   Pulse:  [] 133  Resp:  [15-29] 15  BP: ()/(48-66) 99/63  SpO2:   [92 %-100 %] 100 %    Hemodynamic parameters for last 24 hours       Respiratory Information for the last 24 hours  Vent Mode: Spont  Rate (breaths/min): 22  Vt Target (mL): 310  PEEP/CPAP: 8  P Support: 5  MAP: 16  Length of Weaning Trial (Hours): 40 mins  Control VTE (exp VT): 293    Physical Exam   Physical Exam  Vitals signs and nursing note reviewed.   Constitutional:       Appearance: She is morbidly obese. She is ill-appearing. She is not diaphoretic.      Interventions: She is sedated, intubated and restrained.   HENT:      Head: Normocephalic and atraumatic.      Right Ear: External ear normal.      Left Ear: External ear normal.      Nose: No congestion or rhinorrhea.      Mouth/Throat:      Mouth: Mucous membranes are dry.      Pharynx: No oropharyngeal exudate or posterior oropharyngeal erythema.   Eyes:      General: No scleral icterus.     Extraocular Movements: Extraocular movements intact.      Conjunctiva/sclera: Conjunctivae normal.      Pupils: Pupils are equal, round, and reactive to light.   Neck:      Musculoskeletal: Neck supple. No neck rigidity or muscular tenderness.   Cardiovascular:      Rate and Rhythm: Tachycardia present. Rhythm irregular. Occasional extrasystoles are present.     Pulses: Normal pulses.      Heart sounds: Normal heart sounds. No murmur. No friction rub. No gallop.       Comments: Sinus rhythm  Pulmonary:      Effort: No respiratory distress. She is intubated.      Breath sounds: Decreased breath sounds (Bases) and rhonchi present. No wheezing or rales.      Comments: Intubated, diminished breath sounds  Abdominal:      General: Abdomen is protuberant. There is no distension.      Palpations: Abdomen is soft. There is no mass.      Tenderness: There is no abdominal tenderness. There is no right CVA tenderness, left CVA tenderness or guarding. Negative signs include Charles's sign.   Musculoskeletal:         General: No swelling or tenderness.      Right lower leg: Edema  present.      Left lower leg: Edema present.   Lymphadenopathy:      Cervical: No cervical adenopathy.   Skin:     Coloration: Skin is not cyanotic, jaundiced or pale.      Findings: No bruising, erythema, lesion or rash.      Nails: There is no clubbing.     Neurological:      General: No focal deficit present.      Mental Status: She is lethargic.      GCS: GCS eye subscore is 3. GCS verbal subscore is 1. GCS motor subscore is 6.      Cranial Nerves: No cranial nerve deficit.      Sensory: No sensory deficit.      Motor: No weakness.      Deep Tendon Reflexes: Reflexes normal.      Comments: Sedate/lethargic, follows commands and moves all 4, withdraws all extremities symmetrically, brainstem reflexes intact   Psychiatric:         Behavior: Behavior is cooperative.      Comments: Unable to completely assess, intubated, sedated/lethargic         Medications  Current Facility-Administered Medications   Medication Dose Route Frequency Provider Last Rate Last Dose   • potassium phosphates 30 mmol in D5W 500 mL ivpb  30 mmol Intravenous Once Mahad Sherman M.D. 83.3 mL/hr at 05/04/20 1208 30 mmol at 05/04/20 1208   • oxyCODONE immediate-release (ROXICODONE) tablet 5 mg  5 mg Oral Q4HRS PRN Mahad Sherman M.D.        Or   • oxyCODONE immediate release (ROXICODONE) tablet 10 mg  10 mg Oral Q4HRS PRN Mahad Sherman M.D.       • enoxaparin (LOVENOX) inj 40 mg  40 mg Subcutaneous DAILY Mahad Sherman M.D.       • midodrine (PROAMATINE) tablet 5 mg  5 mg Enteral Tube TID WITH MEALS Mahad Sherman M.D.       • doxycycline monohydrate (ADOXA) tablet 100 mg  100 mg Enteral Tube Q12HRS Vish Carrington M.D.   100 mg at 05/04/20 0507   • furosemide (LASIX) injection 20 mg  20 mg Intravenous BID DIURETIC Vish Carrington M.D.   20 mg at 05/04/20 1629   • potassium bicarbonate (KLYTE) effervescent tablet 25 mEq  25 mEq Enteral Tube BID Vish Carrington M.D.   25 mEq at 05/04/20 0506   • cefTRIAXone (ROCEPHIN) 2 g  in  mL IVPB  2 g Intravenous Q24HRS Fernandez Latif M.D.   Stopped at 05/04/20 0536   • Pharmacy Consult: Enteral tube insertion - review meds/change route/product selection  1 Each Other PHARMACY TO DOSE Vish Carrington M.D.       • senna-docusate (PERICOLACE or SENOKOT S) 8.6-50 MG per tablet 2 Tab  2 Tab Enteral Tube BID Vish Carrington M.D.   2 Tab at 05/04/20 0507    And   • polyethylene glycol/lytes (MIRALAX) PACKET 1 Packet  1 Packet Enteral Tube QDAY PRN Vish Carrington M.D.        And   • magnesium hydroxide (MILK OF MAGNESIA) suspension 30 mL  30 mL Enteral Tube QDAY PRN Vish Carrington M.D.        And   • bisacodyl (DULCOLAX) suppository 10 mg  10 mg Rectal QDAY PRN Vish Carrington M.D.       • acetaminophen (TYLENOL) tablet 650 mg  650 mg Enteral Tube Q6HRS PRN Vish Carrington M.D.       • allopurinol (ZYLOPRIM) tablet 100 mg  100 mg Enteral Tube BID Vish Carrington M.D.   100 mg at 05/04/20 0507   • ondansetron (ZOFRAN ODT) dispertab 4 mg  4 mg Enteral Tube Q6HRS PRN Vish Carrington M.D.       • ascorbic acid tablet 500 mg  500 mg Enteral Tube DAILY Vish Carrington M.D.   500 mg at 05/04/20 0507   • cyclobenzaprine (FLEXERIL) tablet 10 mg  10 mg Enteral Tube TID PRN Vish Carrington M.D.       • aspirin (ASA) chewable tab 81 mg  81 mg Enteral Tube DAILY Vish Carrington M.D.   81 mg at 05/04/20 0507   • Pharmacy Consult Request  1 Each Other PHARMACY TO DOSE Thad Valenzuela M.D.       • Respiratory Therapy Consult   Nebulization Continuous RT Fernandez Latif M.D.       • ipratropium-albuterol (DUONEB) nebulizer solution  3 mL Nebulization Q2HRS PRN (RT) Fernandez Latif M.D.       • famotidine (PEPCID) tablet 20 mg  20 mg Enteral Tube Q12HRS Fernandez Latif M.D.   20 mg at 05/04/20 0506    Or   • famotidine (PEPCID) injection 20 mg  20 mg Intravenous Q12HRS Fernandez Latif M.D.   20 mg at 05/02/20 0448   • MD Alert...ICU Electrolyte Replacement per Pharmacy   Other PHARMACY TO DOSE Fernandez Latif M.D.        • lidocaine (XYLOCAINE) 1 % injection 1-2 mL  1-2 mL Tracheal Tube Q30 MIN PRN Fernandez Latif M.D.       • fentaNYL (SUBLIMAZE) injection 25 mcg  25 mcg Intravenous Q HOUR PRN Fernandez Latif M.D.        Or   • fentaNYL (SUBLIMAZE) injection 50 mcg  50 mcg Intravenous Q HOUR PRN Fernandez Latif M.D.   50 mcg at 05/04/20 1518    Or   • fentaNYL (SUBLIMAZE) injection 100 mcg  100 mcg Intravenous Q HOUR PRN Fernandez Latif M.D.   100 mcg at 05/04/20 1628   • ondansetron (ZOFRAN) syringe/vial injection 4 mg  4 mg Intravenous Q6HRS PRN Fernandez Latif M.D.           Fluids    Intake/Output Summary (Last 24 hours) at 5/4/2020 1746  Last data filed at 5/4/2020 1600  Gross per 24 hour   Intake 1530 ml   Output 3650 ml   Net -2120 ml       Laboratory  Recent Labs     05/02/20  0756 05/03/20  0400 05/04/20  0420   ISTATAPH 7.539* 7.495 7.493   ISTATAPCO2 36.4 44.5* 49.2*   ISTATAPO2 131* 90* 58*   ISTATATCO2 32 36* 39*   IODQEBO4OGP 99 98 91*   ISTATARTHCO3 31.0* 34.3* 37.8*   ISTATARTBE 8* 10* 13*   ISTATTEMP 36.6 C 37.1 C 37.4 C   ISTATFIO2 60 50 30   ISTATSPEC Arterial Arterial Arterial   ISTATAPHTC 7.546* 7.493 7.487   ZPICREGI0YS 128* 91* 60*         Recent Labs     05/01/20  2300 05/02/20  0435 05/03/20  0500 05/04/20  0504   SODIUM 137 137 142 141   POTASSIUM 4.4 4.0 3.3* 3.4*   CHLORIDE 90* 91* 99 98   CO2 31 26 28 31   * 115* 79* 50*   CREATININE 2.41* 2.08* 1.42* 0.99   MAGNESIUM 2.8*  --  2.1 1.7   PHOSPHORUS 7.6*  --  2.6 1.9*   CALCIUM 8.7 8.5 8.3* 8.7     Recent Labs     05/01/20 2300 05/02/20  0435 05/03/20  0500 05/04/20  0504 05/04/20  1200   ALTSGPT 22 21  --  13  --    ASTSGOT 26 25  --  18  --    ALKPHOSPHAT 74 75  --  61  --    TBILIRUBIN 0.6 0.7  --  0.6  --    PREALBUMIN  --   --  11.4*  --  9.4*   GLUCOSE 88 80 111* 126*  --      Recent Labs     05/01/20 2300 05/02/20 0435 05/03/20  0500 05/04/20  0504   WBC 10.3 13.7* 10.9* 11.4*   NEUTSPOLYS 70.10 71.00 76.50* 77.20*   LYMPHOCYTES 18.90*  17.30* 14.00* 11.90*   MONOCYTES 8.60 9.10 8.00 8.90   EOSINOPHILS 1.60 2.00 0.80 1.30   BASOPHILS 0.30 0.20 0.20 0.20   ASTSGOT 26 25  --  18   ALTSGPT 22 21  --  13   ALKPHOSPHAT 74 75  --  61   TBILIRUBIN 0.6 0.7  --  0.6     Recent Labs     05/01/20  2300 05/02/20  0152 05/02/20  0435  05/03/20  0500 05/03/20  1200 05/04/20  0504   RBC 5.08  --  5.01  --  5.05  --  4.90   HEMOGLOBIN 13.6  --  13.5  --  13.3  --  12.8   HEMATOCRIT 45.4  --  42.8  --  43.0  --  43.0   PLATELETCT 127*  --  124*  --  130*  --  132*   PROTHROMBTM  --  15.6*  --   --   --   --   --    APTT  --  27.3  --    < > 93.0* 80.1* 60.4*   INR  --  1.20*  --   --   --   --   --    FERRITIN 14.7  --   --   --   --   --   --     < > = values in this interval not displayed.       Imaging  X-Ray:  I have personally reviewed the images and compared with prior images.     Echo 5/2  CONCLUSIONS  No prior study is available for comparison.   Normal left ventricular chamber size.  Left ventricular ejection fraction is visually estimated to be greater   than 75%.  Severely dilated right ventricle.  Reduced right ventricular systolic function.  Mild aortic stenosis.  Estimated right ventricular systolic pressure  is 70 mmHg    Assessment/Plan  * Acute respiratory failure with hypoxia and hypercapnia (HCC)  Assessment & Plan  Right heart failure and sepsis/pneumonia  Suspected covid initially, PCR for COVID negative x3 now  CTA negative for PE, left lower lobe atelectasis/pneumonia suspected, diffuse viral process not seen  Probably can stop heparin drip from a DVT/PE standpoint since both studies are negative  On ventilator, intubated 5/1  RT protocols/ventilator bundle  SAT/SBT when clinically appropriate  Not ready for liberation trial    Pulmonary hypertension (HCC)  Assessment & Plan  RVSP 70  Significant dilation left atrium  BMI 40 query component of PREMA/OHS  Suspect chronic hypoxemia  CTA negative for PE 5/4  No clear valvular abnormalities on echo  with preserved LV function  New onset atrial flutter 5/4  Continue anticoagulation for now and consult cardiology    Atrial flutter (HCC)  Assessment & Plan  Intermittently with RVR  New onset 5/4  Echo with severe pulmonary pretension  CTA with no evidence of pulmonary embolus but dilated left atrium  Optimize electrolytes  Midodrine to support blood pressure, intravenous Sanket-Synephrine/vasopressin if necessary  May benefit from cardiology consult for pulmonary hypertension/atrial flutter which I suspect may become a chronic problem for her  Continue heparin drip for now    Septic shock ([facility])  Assessment & Plan  This is Septic shock Present on admission  SIRS criteria identified on my evaluation include: Tachycardia, with heart rate greater than 90 BPM, Tachypnea, with respirations greater than 20 per minute and Leukocytosis, with WBC greater than 12,000  Source is likely pneumonia, + diarrhea,  covid ruled out with 3 serial negative studies  Presentation includes: hypervolemic with heart failure, unable to give sepsis bolus  \the patient remains hypotensive with systolic blood pressure less than 90 or MAP less than 65  Hemodynamic support with additional fluids and IV vasopressors as needed to maintain a SBP of 90 or MAP of 65  IV antibiotics as appropriate for source of sepsis  Reassessment: I have reassessed the patient's hemodynamic status      Acute on chronic diastolic heart failure (HCC)  Assessment & Plan  Right heart enlarged with deviated septum on my bedside us  Echo noted  Pressors as needed, limiting IV fluids  COVID PCR negative x3  Initially treated with IV heparin, noninvasive study and CTA chest both negative for thrombus  Worse diuresis as BP allows  Morbidly obese, query OHS/PREMA    Pneumonia due to infectious organism  Assessment & Plan  Purulent secretions on intubation  Possibly from aspiration or cap  Antibiotics- cefepime and doxycycline with last day of treatment 5/6  COVID PCR negative  daily x3 days-CT scan does not reveal diffuse viral pneumonic process  Cultures negative so far  White count mildly elevated and inflammatory markers elevated    Acute renal failure (ARF) (HCC)  Assessment & Plan  Likely from poor perfusion from sepsis  Improved  Avoid nephrotoxins as able clinically  Renally dose medications were clinically appropriate  Monitor urine output and serial BMP    Acute encephalopathy  Assessment & Plan  multifactorial, improved  Monitor    Aortic stenosis  Assessment & Plan  Contributory to right heart failure  Monitor for need for cardiology consultation    Diarrhea  Assessment & Plan  Unclear etiology, doubt COVID, improved  Does not seem to have been treated with ab prior      Essential hypertension  Assessment & Plan  Keep sbp < 160  Resume home regimen when clinically appropriate including Benicar, Cozaar and Toprol-XL    Hypokalemia  Assessment & Plan  Replete, goal greater than 4.0, ERP  Also replete magnesium to greater than 2.0    Hypophosphatemia  Assessment & Plan  Replete, goal greater than 2.5, ERP    Suspected COVID-19 virus infection  Assessment & Plan  covid PCR x3 all negative, intermittently performed daily 5/1, 5/2 and 5/3  Diarrhea may be a sign of covid, improved  Respiratory failure, persist  CTA most consistent with left lower lobe atelectasis and small effusion, query pneumonia there, diffuse viral pneumonia or PE not seen         VTE:  Heparin  Ulcer: H2 Antagonist  Lines: Central Line  Ongoing indication addressed    I have performed a physical exam and reviewed and updated ROS and Plan today (5/4/2020). In review of yesterday's note (5/3/2020), there are no changes except as documented above.     Discussed patient condition and risk of morbidity and/or mortality with RN, RT, Pharmacy, Charge nurse / hot rounds and Patient     The patient remains critically ill.  Patient remains on full vent support and is inappropriate for liberation trial.  I have serially  assessed mechanics and the ventilator and made changes with RT.  COVID study x3 is now negative and CTA chest rules out a PE and for the most part pneumonia except for left lower lobe atelectasis/pneumonia with a very small effusion.  We can discontinue COVID isolation.  With no evidence of DVT can switch to prophylactic Lovenox.  We will continue Lasix, antibiotics and aggressive pulmonary toilet.  Patient remains at high risk for increased morbidity mortality without above critical care interventions.    Critical care time = 85 minutes in directly providing and coordinating critical care and extensive data review.  No time overlap and excludes procedures.

## 2020-05-04 NOTE — HEART FAILURE PROGRAM
Cardiovascular Nurse Navigator () Advanced Heart Failure Program HF New Diagnosis Consult Note:     Patient was transferred from [facility] for SOB and new onset renal failure. She was noted to SOB and LE edema as well. She had, per ERP note been recently started on furosemide, does not say what for.     Echocardiogram shows severely dilated right ventricle, she has no encounters with cardiology or pulmonology.     She was admitted to a medical floor but has had to be intubated and is currently in T637. Per respiratory note today, patient failed SBT Weaning Trials.    Please see below for required HF measures should patient become well enough to discharge. I did ask hospital schedulers to monitor for HF f/u appointment.    Daily Nurse: please begin to fill out the HF checklist (pink sheet in hard chart) and use it to guide your daily care.    Discharge Nurse: please ensure completeness of the HF checklist (pink sheet in hard chart) and have it co-signed by the charge RN before the patient leaves the hospital.     Many thanks, Peggy, Cardiovascular Nurse Navigator, RN, [facility] [phone], & [employer] M-F (excluding holidays).      HF Measures:  1. Documentation of LV systolic function (echo or cath) PTA, during this hospitalization, or plan to assess post discharge or reason for not assessing documented  2. Documentation of fluid intake and urine output every nursing shift  3. 2 hour post diuretic assessment documented 2 hours after diuretic given  4. HF Patient Education using the Living Well With Heart Failure Booklet and Symptom Tracker documented every nursing shift  5. Nutrition consult for diet education  6. Daily weights (one weight documented every 24 hours) on a standing scale unless standing is contraindicated in which case bed scale can be used - have patient write weight on symptom tracker  7. For LVEF less than or equal to 40%, ACE-I, ARNI or ARB prescribed at discharge   8. For LVEF less than or  equal to 40%, an Evidence Based Beta Blocker (bisoprolol, carvedilol, toprol xl) must be prescribed at discharge  9. For LVEF less than or equal to 35% aldosterone blockade prescribed at discharge  10. The combination of hydralazine and isosorbide dinitrate is recommended to reduce morbidity and mortality for patients self-described  Americans with NYHA class III-IV HFrEF (EF 40% or less), receiving optimal therapy with ACE inhibitors and beta blockers, unless contraindicated (Class I, YASIR: A).  11. If a HF patient is diabetic or is newly diagnosed with DM: prescribed diabetes treatment at discharge in the form of glycemic control (diet or anti-hyperglycemic medication) or f/u appointment for diabetes management scheduled at discharge.  12. If a HF patient has diabetes: prescribed lipid lowering medication at discharge  13. Documented smoking cessation advice or counseling  14. If a HF patient has a-fib: anticoagulation is prescribed upon discharge or contraindication is documented  15. Screening for and administering immunizations as long as no contraindications: Pneumonia (regardless of age) and Influenza  16. Written discharge instructions include:  ? Daily weights  ? Record weight on tracker  ? Bring tracker to appointments  ? Call MD for weight gain of 3lb /day or 5lb/week  ? HF medication teaching  ? Low sodium diet  ? Follow up appointment within seven calendar days of d/c must include: date, time and location  ? Activity  ? Worsening symptoms    What if any of the above HF measures are contraindicated?  ? Request that the discharging provider document the medication/intervention and the contraindication specifically in a progress note  ? For example: “no CHF meds due to hypotension” is not enough. It needs to say: “No ACE-I, ARNI, ARB due to hypotension”; “No Beta Blockade due to bradycardia”…

## 2020-05-04 NOTE — INFECTION CONTROL
Patient has had three COVID-19 negatives with at least 48 hours apart, per physician notes and algorithm patient is appropriate for de-escalation of isolation for COVID-19 at this time.

## 2020-05-04 NOTE — CARE PLAN
Problem: Nutritional:  Goal: Nutrition support tolerated and meeting greater than 85% of estimated needs  Outcome: MET   Peptamen Intense VHP @ 45 mL/hr (final goal rate).

## 2020-05-04 NOTE — CARE PLAN
Problem: Pain Management  Goal: Pain level will decrease to patient's comfort goal  Note: Pain assessed q2 hours using CPOT scale. Medicated per MAR and positioned for comfort       Problem: Skin Integrity  Goal: Risk for impaired skin integrity will decrease  Note: Pt turned and repositioned q2, heels floated with pillows, pillow in use to support bony prominences, mepilex in use, waffle cushion in use interdry applied to pannus/groin and under breasts.

## 2020-05-05 NOTE — PROGRESS NOTES
Patient was cardioverted at 1533 by Dr. Dudley with 120J after receiving 50 mcg of Fentanyl at 1530 and 2mg of Versed at 1531. Patient was a RASS -5 pre cardioversion. Patient now in a NSR at 70bpm and /56 (74). Will continue to monitor.

## 2020-05-05 NOTE — DISCHARGE PLANNING
Care Transition Team Discharge Planning    Anticipated Discharge Disposition: TBD    Action: Lsw received email. There are no NOK results for this pt.     Barriers to Discharge: TBD    Plan: f/u w/ medical team, etc.

## 2020-05-05 NOTE — PROGRESS NOTES
"Critical Care Progress Note    Date of admission  5/1/2020    Chief Complaint  80 y.o. female admitted 5/1/2020 with increasing dyspnea, CHF versus pneumonia, severe pulmonary hypertension, intubated 5/1    Hospital Course    \"80 y.o. female who presented 5/1/2020 with respiratory failure with hypoxia.  She has a hx of hypertension, chf, aortic stenosis and was initially admitted to medical floor.  Per admission note 5 days of sob.  Worse when she lays down.  Diarrhea for 3 days.  No abdomianl pain.  Recently started on lasix outpatient due to lower ext edema and weight gain.  Transferred from [facility].  Labs were significant for renal failure with cr 2.6, troponin 0.08, wbc 12.  She has possible pneumonia on chest xray.  Covid suspected and negative test on admission.  Respiratory failure and transferred to ICU.  She had hypercapnic respiratory failure with hypoxia.  Lethargic. Intubated.  There were purulent secretions on intubation upper airway.  Bedside us showed enlarged right heart, I started on heparin drip. Avoid contrast exam for now given suspected covid of which may be reason for diarrhea.  Weaning o2 on ventilator after needing high peep. Levophed for shock.  Treating for pneumonia.  Given 500 ml IVF after intubation.  She is becoming hypervolemic at this time.  Added vasopressin.  Ongoing agitation despite propofol.  Required arterial line and central line.  No pericardial effusion, adequate LVEF at least moderate to normal function.  Started antibiotics.  Blood cx pending.  Moves all extremities and no focal deficits.\" -Dr Bhavya 5/1     5/2 -severe pulmonary pretension noted on echocardiogram, preserved LVEF.  Continuing heparin drip and will hold off on CTA until renal function improved.  Stop Versed drip, PRN pushes of fentanyl if needed and now following some commands.  5/3 -titrated off norepinephrine.   Starting Lasix, off norepinephrine, plan for CTA to rule out PE if renal " function improves tomorrow.      Interval Problem Update  Reviewed last 24 hour events:     Follows - remains slow - more awake today  SR -> Aflutter - rate ok except w/ exertion  SBp 90-100s  Afeb, T-max 100.4  WBC 12.5  UO great  I/Os  Neg 2.15 L  TF goal  Heparin drip  Platelet count slightly down at 115, no bleeding clinically  Renal function mildly improved  COVID 2nd study neg  C diff pending from this AM  VentDay#5  PEEP 8, 30%  Weans slt better ready for liberation  CXR slt better opacities bilaterally    EKG, follow-up a flutter  Cards eval Re: Pulmonary hypertension and atrial flutter  K repletion  Resume beta-blocker, metoprolol 25 mg twice daily  Consider adding ACE inhibitor tomorrow if hemodynamics acceptable    Repeat EKG reviewed, remains in a flutter but rate acceptable    Case reviewed with cardiology at length at the bedside  We will load with amiodarone bolus and started drip  Consider cardioversion although we both suspects she may not stay in sinus with her degree of pulmonary pretension and thus the amiodarone to help  Electrolytes optimized    Reviewed case at bedside with cardiology.  Reviewed echocardiogram and CTA along with current EKGs and hemodynamics.  Patient cardioverted successfully into sinus rhythm after completion of amnio bolus and currently on amnio drip.  Reviewed resuming home medicines for hypertension versus a flutter rate control.  Cardiology prefers resuming losartan over metoprolol at half the usual dose, orders adjusted.  Additionally will initiate anticoagulation with oral agent, Eliquis, for anticoagulation for unknown duration of atrial flutter.  Severe pulmonary pretension also fairly prudent the anticoagulated at least for now.  Doubt thromboembolic disease or valvular disease etiology for pulmonary pretension.  Suspect combination of underlying respiratory and sleep apnea issues versus less likely primary pulmonary pretension.  Follow-up echocardiogram once  hemodynamically improved and off the ventilator on reasonable supplemental oxygen     YESTERDAY  Follows slowly  Agitated - few IV versed pops  Moves all 4 - follows well  SR 90s  SBp 90s  Edema unchanged by report  Afebrile  UO adequate - neg 1800 cc  TF cortrak goal  VentDay  PEEP 8, 40%  RSBI 64, very poor weans  Lasix IV  Heparin drip  Cef/Doxy 3/5  Cultures neg  COVID-19 negative on 5/1, 5/2 and 5/3  Magnesium 1.7, phosphorus 1.9 and potassium 3.4  Creatinine now normal  BMP improving over the weekend    Replete K/Phos/Mg  Mobilize  D/c Versed  PT/OT  CTA chest today, rule out PE, eval pneumonia    CTA chest completed  No evidence of PE  Left greater than right lower lobe atelectasis with minimal effusion   No obvious mass or adenopathy    Serial follow-up  Since patient back from CT heart rate is been elevated and appears to be atrial flutter  Blood pressure remains soft at times  Electrolytes reviewed again with RN from this a.m.  Check EKG  Continue heparin for now, had considered stopping it with negative CTA for pulmonary embolus and no evidence of DVT on noninvasive studies  Midodrine 5 mg every 8 hours  Cardiology consult for pulmonary hypertension and atrial arrhythmias which I suspect will be a chronic problem    Review of Systems  Review of Systems   Unable to perform ROS: Intubated        Vital Signs for last 24 hours   Temp:  [37.5 °C (99.5 °F)-37.8 °C (100 °F)] 37.5 °C (99.5 °F)  Pulse:  [] 68  Resp:  [15-30] 28  BP: ()/(49-78) 127/69  SpO2:  [97 %-100 %] 100 %    Hemodynamic parameters for last 24 hours       Respiratory Information for the last 24 hours  Vent Mode: Spont  Rate (breaths/min): 18  Vt Target (mL): 310  PEEP/CPAP: 8  P Support: 5  MAP: 12  Length of Weaning Trial (Hours): 1.5  Control VTE (exp VT): 306    Physical Exam   Physical Exam  Vitals signs and nursing note reviewed.   Constitutional:       Appearance: She is morbidly obese. She is ill-appearing. She is not  diaphoretic.      Interventions: She is sedated, intubated and restrained.      Comments: Follows a little better today   HENT:      Head: Normocephalic and atraumatic.      Right Ear: External ear normal.      Left Ear: External ear normal.      Nose: No congestion or rhinorrhea.      Mouth/Throat:      Mouth: Mucous membranes are dry.      Pharynx: No oropharyngeal exudate or posterior oropharyngeal erythema.   Eyes:      General: No scleral icterus.     Extraocular Movements: Extraocular movements intact.      Conjunctiva/sclera: Conjunctivae normal.      Pupils: Pupils are equal, round, and reactive to light.   Neck:      Musculoskeletal: Neck supple. No neck rigidity or muscular tenderness.   Cardiovascular:      Rate and Rhythm: Tachycardia present. Rhythm irregular. Occasional extrasystoles are present.     Pulses: Normal pulses.      Heart sounds: Normal heart sounds. No murmur. No friction rub. No gallop.       Comments: Remains in atrial flutter  Pulmonary:      Effort: No respiratory distress. She is intubated.      Breath sounds: Decreased breath sounds (Bases) and rhonchi present. No wheezing or rales.      Comments: Intubated, diminished breath sounds  Abdominal:      General: Abdomen is protuberant. There is no distension.      Palpations: Abdomen is soft. There is no mass.      Tenderness: There is no abdominal tenderness. There is no right CVA tenderness, left CVA tenderness or guarding. Negative signs include Charles's sign.   Musculoskeletal:         General: No swelling or tenderness.      Right lower leg: Edema present.      Left lower leg: Edema present.   Lymphadenopathy:      Cervical: No cervical adenopathy.   Skin:     Coloration: Skin is not cyanotic, jaundiced or pale.      Findings: No bruising, erythema, lesion or rash.      Nails: There is no clubbing.     Neurological:      General: No focal deficit present.      Mental Status: She is easily aroused. She is lethargic.      GCS: GCS eye  subscore is 4. GCS verbal subscore is 1. GCS motor subscore is 6.      Cranial Nerves: No cranial nerve deficit.      Sensory: No sensory deficit.      Motor: No weakness.      Deep Tendon Reflexes: Reflexes normal.      Comments: Sedate/lethargic, follows commands and moves all 4, withdraws all extremities symmetrically, brainstem reflexes intact   Psychiatric:         Behavior: Behavior is cooperative.      Comments: Unable to completely assess, intubated, sedated/lethargic the most part         Medications  Current Facility-Administered Medications   Medication Dose Route Frequency Provider Last Rate Last Dose   • amiodarone (CORDARONE) 450 mg in D5W 250 mL Infusion  0.5-1 mg/min Intravenous Continuous Mahad Sherman M.D. 33 mL/hr at 05/05/20 1345 1 mg/min at 05/05/20 1345   • losartan (COZAAR) tablet 25 mg  25 mg Oral BID Mahad Sherman M.D.       • oxyCODONE immediate-release (ROXICODONE) tablet 5 mg  5 mg Oral Q4HRS PRN Mahad Sherman M.D.        Or   • oxyCODONE immediate release (ROXICODONE) tablet 10 mg  10 mg Oral Q4HRS PRN Mahad Sherman M.D.       • midodrine (PROAMATINE) tablet 5 mg  5 mg Enteral Tube TID WITH MEALS Mahad Sherman M.D.   5 mg at 05/05/20 1051   • MD Alert...HEPARIN WEIGHT BASED PROTOCOL Pharmacist to implement   Other PRN Mahad Sherman M.D.       • heparin injection 3,200 Units  3,200 Units Intravenous PRN Mahad Sherman M.D.        And   • heparin infusion 25,000 units in 500 mL 0.45% NACL   Intravenous Continuous Mahad Sherman M.D. 16 mL/hr at 05/05/20 0400 800 Units/hr at 05/05/20 0400   • doxycycline monohydrate (ADOXA) tablet 100 mg  100 mg Enteral Tube Q12HRS Vish Carrington M.D.   100 mg at 05/05/20 0504   • furosemide (LASIX) injection 20 mg  20 mg Intravenous BID DIURETIC Vish Carrington M.D.   20 mg at 05/05/20 0504   • potassium bicarbonate (KLYTE) effervescent tablet 25 mEq  25 mEq Enteral Tube BID Vish Carrington M.D.   25 mEq at 05/05/20  0503   • cefTRIAXone (ROCEPHIN) 2 g in  mL IVPB  2 g Intravenous Q24HRS Fernandez Latif M.D.   Stopped at 05/05/20 0534   • Pharmacy Consult: Enteral tube insertion - review meds/change route/product selection  1 Each Other PHARMACY TO DOSE Vish Carrington M.D.       • acetaminophen (TYLENOL) tablet 650 mg  650 mg Enteral Tube Q6HRS PRN Vish Carrington M.D.       • allopurinol (ZYLOPRIM) tablet 100 mg  100 mg Enteral Tube BID Vish Carrington M.D.   100 mg at 05/05/20 0504   • ondansetron (ZOFRAN ODT) dispertab 4 mg  4 mg Enteral Tube Q6HRS PRN Vish Carrington M.D.       • ascorbic acid tablet 500 mg  500 mg Enteral Tube DAILY Vish Carrington M.D.   500 mg at 05/05/20 0504   • cyclobenzaprine (FLEXERIL) tablet 10 mg  10 mg Enteral Tube TID PRN Vish Carrington M.D.       • aspirin (ASA) chewable tab 81 mg  81 mg Enteral Tube DAILY Vish Carrington M.D.   81 mg at 05/05/20 0504   • Pharmacy Consult Request  1 Each Other PHARMACY TO DOSE Thad Valenzuela M.D.       • Respiratory Therapy Consult   Nebulization Continuous RT Fernandez Latif M.D.       • ipratropium-albuterol (DUONEB) nebulizer solution  3 mL Nebulization Q2HRS PRN (RT) Fernandez Latif M.D.       • famotidine (PEPCID) tablet 20 mg  20 mg Enteral Tube Q12HRS Fernandez Latif M.D.   20 mg at 05/05/20 0600    Or   • famotidine (PEPCID) injection 20 mg  20 mg Intravenous Q12HRS Fernandez Latif M.D.   20 mg at 05/02/20 0448   • MD Alert...ICU Electrolyte Replacement per Pharmacy   Other PHARMACY TO DOSE Fernandez Latif M.D.       • lidocaine (XYLOCAINE) 1 % injection 1-2 mL  1-2 mL Tracheal Tube Q30 MIN PRN Fernandez Latif M.D.       • fentaNYL (SUBLIMAZE) injection 25 mcg  25 mcg Intravenous Q HOUR PRN Fernandez Latif M.D.        Or   • fentaNYL (SUBLIMAZE) injection 50 mcg  50 mcg Intravenous Q HOUR PRN Fernandez Latif M.D.   50 mcg at 05/05/20 1530    Or   • fentaNYL (SUBLIMAZE) injection 100 mcg  100 mcg Intravenous Q HOUR PRN Fernandez Latif M.D.   100 mcg at  05/04/20 1628   • ondansetron (ZOFRAN) syringe/vial injection 4 mg  4 mg Intravenous Q6HRS PRN Fernandez Latif M.D.           Fluids    Intake/Output Summary (Last 24 hours) at 5/5/2020 1612  Last data filed at 5/5/2020 1400  Gross per 24 hour   Intake 1808.63 ml   Output 2715 ml   Net -906.37 ml       Laboratory  Recent Labs     05/03/20  0400 05/04/20  0420 05/05/20  0354   ISTATAPH 7.495 7.493 7.437   ISTATAPCO2 44.5* 49.2* 52.3*   ISTATAPO2 90* 58* 78   ISTATATCO2 36* 39* 37*   ZBHRUPB4ZUS 98 91* 96   ISTATARTHCO3 34.3* 37.8* 35.3*   ISTATARTBE 10* 13* 9*   ISTATTEMP 37.1 C 37.4 C 37.5 C   ISTATFIO2 50 30 30   ISTATSPEC Arterial Arterial Arterial   ISTATAPHTC 7.493 7.487 7.429   JOISUQRI2QP 91* 60* 81         Recent Labs     05/03/20  0500 05/04/20  0504 05/04/20 2000 05/05/20  0415   SODIUM 142 141 140 138   POTASSIUM 3.3* 3.4* 4.4 3.8   CHLORIDE 99 98 95* 96   CO2 28 31 33 31   BUN 79* 50* 43* 41*   CREATININE 1.42* 0.99 0.88 0.84   MAGNESIUM 2.1 1.7 2.0  --    PHOSPHORUS 2.6 1.9*  --   --    CALCIUM 8.3* 8.7 8.8 8.7     Recent Labs     05/03/20  0500 05/04/20  0504 05/04/20  1200 05/04/20 2000 05/05/20  0415   ALTSGPT  --  13  --   --   --    ASTSGOT  --  18  --   --   --    ALKPHOSPHAT  --  61  --   --   --    TBILIRUBIN  --  0.6  --   --   --    PREALBUMIN 11.4*  --  9.4*  --   --    GLUCOSE 111* 126*  --  127* 126*     Recent Labs     05/03/20  0500 05/04/20  0504 05/05/20 0415   WBC 10.9* 11.4* 12.5*   NEUTSPOLYS 76.50* 77.20* 69.30   LYMPHOCYTES 14.00* 11.90* 15.60*   MONOCYTES 8.00 8.90 11.20   EOSINOPHILS 0.80 1.30 3.50   BASOPHILS 0.20 0.20 0.20   ASTSGOT  --  18  --    ALTSGPT  --  13  --    ALKPHOSPHAT  --  61  --    TBILIRUBIN  --  0.6  --      Recent Labs     05/03/20  0500 05/03/20  1200 05/04/20  0504 05/05/20 0415   RBC 5.05  --  4.90 4.89   HEMOGLOBIN 13.3  --  12.8 13.2   HEMATOCRIT 43.0  --  43.0 41.8   PLATELETCT 130*  --  132* 115*   APTT 93.0* 80.1* 60.4* 60.2*       Imaging  X-Ray:  I  have personally reviewed the images and compared with prior images.     Echo 5/2  CONCLUSIONS  No prior study is available for comparison.   Normal left ventricular chamber size.  Left ventricular ejection fraction is visually estimated to be greater   than 75%.  Severely dilated right ventricle.  Reduced right ventricular systolic function.  Mild aortic stenosis.  Estimated right ventricular systolic pressure  is 70 mmHg    Assessment/Plan  * Acute respiratory failure with hypoxia and hypercapnia (HCC)  Assessment & Plan  Right heart failure and sepsis/pneumonia  Suspected covid initially, PCR for COVID negative x3 now, isolation discontinued  CTA negative for PE, left lower lobe atelectasis/pneumonia suspected, diffuse viral process not seen  Probably can stop heparin drip from a DVT/PE standpoint since both studies are negative, treating with heparin for a flutter  On ventilator, intubated 5/1  RT protocols/ventilator bundle  SAT/SBT when clinically appropriate  Mobilize when cardiac rhythm stabilized  Still not ready for liberation trial    Pulmonary hypertension (HCC)  Assessment & Plan  RVSP 70  Significant dilation left atrium  BMI 40 query component of PREMA/OHS  Suspect chronic hypoxemia  CTA negative for PE 5/4  No clear valvular abnormalities beyond mild left ear on echo with preserved LV function  New onset atrial flutter 5/4  Continue anticoagulation for now and consult cardiology  Resuming beta-blocker then ACE inhibitor, hopefully this will help    Atrial flutter (HCC)  Assessment & Plan  Intermittently with RVR, rate controlled except for with exertion  New onset 5/4  Echo with severe pulmonary pretension and mild left ear  CTA with no evidence of pulmonary embolus but dilated left atrium  Continue to optimize electrolytes  Midodrine to support blood pressure, intravenous Sanket-Synephrine/vasopressin if necessary  Obtain cardiology consult for pulmonary hypertension/atrial flutter which I suspect may  become a chronic problem for her  Continue heparin drip for now, consider switching to enteral NOAC    Septic shock ([facility])  Assessment & Plan  This is Septic shock Present on admission  SIRS criteria identified on my evaluation include: Tachycardia, with heart rate greater than 90 BPM, Tachypnea, with respirations greater than 20 per minute and Leukocytosis, with WBC greater than 12,000  Source is likely pneumonia, + diarrhea,  covid ruled out with 3 serial negative studies  Presentation includes: hypervolemic with heart failure, unable to give sepsis bolus  \the patient remains hypotensive with systolic blood pressure less than 90 or MAP less than 65  Hemodynamic support with additional fluids and IV vasopressors as needed to maintain a SBP of 90 or MAP of 65  IV antibiotics as appropriate for source of sepsis  Reassessment: I have reassessed the patient's hemodynamic status      Acute on chronic diastolic heart failure (HCC)  Assessment & Plan  Right heart enlarged with deviated septum on my bedside us  Echo noted, severe pulmonary pretension  Pressors as needed, limiting IV fluids  Resume losartan when hemodynamics okay-5/5, hold beta-blocker for now  COVID PCR negative x3, isolation removed  Initially treated with IV heparin, noninvasive study and CTA chest both negative for thrombus  Forced diuresis as BP allows  Morbidly obese, query OHS/PREMA    Pneumonia due to infectious organism  Assessment & Plan  Purulent secretions on intubation  Possibly from aspiration or cap  Antibiotics- cefepime and doxycycline with last day of treatment 5/6  COVID PCR negative daily x3 days-CT scan does not reveal diffuse viral pneumonic process  Cultures negative so far, no sputum culture ever sent?  White count mildly elevated and inflammatory markers elevated    Acute renal failure (ARF) ([facility])  Assessment & Plan  Likely from poor perfusion from sepsis/cardiac dysfunction  Improved slightly again  Avoid nephrotoxins as able  clinically  Renally dose medications were clinically appropriate  Monitor urine output and serial BMP    Acute encephalopathy  Assessment & Plan  multifactorial, improved again  Limit sedation as clinically appropriate  Monitor    Aortic stenosis  Assessment & Plan  Contributory to right heart failure #  Mild echocardiogram report  Cardiology consultation requested 5/5    Diarrhea  Assessment & Plan  Unclear etiology, doubt COVID, improved  Does not seem to have been treated with ab prior  No bowel movement for the first 4 days, episode of diarrhea 5/5 and stool for C. difficile sent by nursing staff, it was ordered on admission, results pending      Essential hypertension  Assessment & Plan  Keep SBp < 160  Resume home regimen when clinically appropriate including Benicar, Cozaar and Toprol-XL   Resume Cozaar first, start with split dose of half what she was taking at home, reviewed with cardiology  Continue diuresis and if hemodynamically susceptible after resuming Cozaar consider adding back low-dose beta-blocker    Hypokalemia  Assessment & Plan  Replete, goal greater than 4.0, ERP  Also replete magnesium to greater than 2.0    Hypophosphatemia  Assessment & Plan  Replete, goal greater than 2.5, ERP    Suspected COVID-19 virus infection  Assessment & Plan  covid PCR x3 all negative, intermittently performed daily 5/1, 5/2 and 5/3  Diarrhea may be a sign of covid, improved  Respiratory failure, persist  CTA most consistent with left lower lobe atelectasis and small effusion, query pneumonia there, diffuse viral pneumonia or PE not seen     VTE:  Heparin  Ulcer: H2 Antagonist  Lines: Central Line  Ongoing indication addressed    I have performed a physical exam and reviewed and updated ROS and Plan today (5/5/2020). In review of yesterday's note (5/4/2020), there are no changes except as documented above.     Discussed patient condition and risk of morbidity and/or mortality with RN, RT, Pharmacy, Charge nurse /  hot rounds and Patient     The patient remains critically ill.  Remains on full vent support and is not ready for liberation trial.  Remains in a flutter and will start amiodarone and consider cardioversion, cardiology consult.  We will continue heparin for now although she does not need it for DVT PE which are ruled out for the most part with CTA and noninvasive studies.  Optimizing electrolytes at this time.  Completed antibiotics for presumed pneumonia.  Work-up for pulmonary pretension ongoing, CTA negative for PE, body habitus consistent with PREMA but degree of pulmonary pretension worse than would be expected with PREMA alone, cardiology consult pending.  Continuing forced diuresis with IV Lasix.  Patient remains at high risk for increased morbidity mortality without above critical care interventions.    Critical care time = 85 minutes in directly providing and coordinating critical care and extensive data review.  No time overlap and excludes procedures.

## 2020-05-05 NOTE — ASSESSMENT & PLAN NOTE
RVSP 70  Significant dilation left atrium  BMI 40 query component of PREMA/OHS will need outpatient sleep study and pulmonary and cardiology followup  Suspect chronic hypoxemia  CTA negative for PE 5/4  No clear valvular abnormalities beyond mild left ear on echo with preserved LV function  New onset atrial flutter 5/4, recurrent 5/6-7  Continue apixaban

## 2020-05-05 NOTE — CONSULTS
Cardiology Initial Consultation    Date of Service  5/5/2020    Referring Physician  Mahad Sherman    Reason for Consultation  Atrial flutter    History of Presenting Illness  Airam Leavitt is a 80 y.o. female who was transferred from [place] on May 1 with progressive dyspnea and acute kidney injury.  Her renal function has normalized since admission.  Echo shows hyperdynamic LV function without obstruction, aortic stenosis the upper end of mild range with peak and mean valve gradients of 38 and 18 respectively, and severe pulmonary artery hypertension with pulmonary pressure of 70 and RV enlargement.    Yesterday, the patient went into atrial flutter and at times has had rapid ventricular response particular when she is moved around.  The patient is currently intubated so history of any prior atrial fibrillation events are not obtainable from the patient.    EKG from the fourth was personally reviewed.  Interpretation is that this demonstrates atrial flutter with variable ventricular response rate and underlying right bundle branch block.  The right bundle branch block has been present previously and was noted on the prior EKG of 2016.  Review of Systems the  Review of Systems   Unable to perform ROS: Intubated       Past Medical History   has a past medical history of Acute hemorrhagic cystitis, Aortic stenosis, Arthritis, Asthma, ASTHMA (1/22/2013), BMI 38.0-38.9,adult, Cataract, Cellulitis, CHF (congestive heart failure) (HCC), Chronic UTI (1/22/2013), Gout, GOUT (1/22/2013), HTN (hypertension), HTN, goal below 130/80 (1/22/2013), Hypertension, Joint pain, RBBB, and S/P left oophorectomy.    Surgical History   has a past surgical history that includes cholecystectomy (2004); tonsillectomy (1948); wrist orif (1953); salpingo-oophorectomy, unilateral (2001); lumbar decompression (2009); other (1966); ankle arthroscopy (1996); eye lesion excision (Left, 7/27/2016); and flap closure (Left,  7/27/2016).    Family History  family history includes Alcohol/Drug in her father; Cancer in her maternal grandmother; Diabetes in her sister; Heart Disease in her maternal grandmother and mother; Heart Disease (age of onset: 50) in her sister; Other in her father and sister.    Social History   reports that she has never smoked. She has never used smokeless tobacco. She reports that she does not drink alcohol or use drugs.    Medications  Prior to Admission Medications   Prescriptions Last Dose Informant Patient Reported? Taking?   Multiple Vitamins-Minerals (ALIVE WOMENS ENERGY PO)   Yes No   Sig: Take  by mouth.   Non Formulary Request   Yes No   Sig: Eye promise 1 tab daily   Probiotic Product (ACIDOPHILUS PROBIOTIC BLEND PO)   Yes No   Sig: Take  by mouth.   allopurinol (ZYLOPRIM) 100 MG TABS   No No   Sig: Take 1 Tab by mouth 2 times a day.   ascorbic acid (ASCORBIC ACID) 500 MG TABS   Yes No   Sig: Take 500 mg by mouth every day.     aspirin EC (ECOTRIN) 81 MG TBEC   Yes No   Sig: Take 81 mg by mouth every day.   cyclobenzaprine (FLEXERIL) 10 MG TABS   No No   Sig: Take 1 Tab by mouth 3 times a day as needed for Muscle Spasms.   hydrocodone-acetaminophen (NORCO) 5-325 MG Tab per tablet   Yes No   Sig: Take 1 Tab by mouth every 24 hours as needed. FOR PAIN   indomethacin (INDOCIN) 25 MG CAPS   No No   Sig: Take 1 Cap by mouth 2 times a day, with meals.   losartan (COZAAR) 100 MG TABS   No No   Sig: Take 1 Tab by mouth every day.   metoprolol SR (TOPROL XL) 50 MG TB24   No No   Sig: Take 1 Tab by mouth every day.   olmesartan (BENICAR) 20 MG TABS   No No   Sig: Take 1 Tab by mouth every day.   raloxifene (EVISTA) 60 MG TABS   No No   Sig: Take 1 Tab by mouth every day.      Facility-Administered Medications: None       Allergies  Allergies   Allergen Reactions   • Keflex Vomiting   • Macrobid [Nitrofurantoin Monohydrate Macrocrystals] Hives   • Minocin [Kdc:Yellow Dye+Edetic Acid+Minocycline+Sodium  Benzoate+Brilliant Blue Fcf] Hives       Vital signs in last 24 hours  Temp:  [37.5 °C (99.5 °F)-37.8 °C (100 °F)] 37.5 °C (99.5 °F)  Pulse:  [] 77  Resp:  [15-30] 20  BP: ()/(49-78) 105/62  SpO2:  [94 %-100 %] 99 %    Physical Exam  Physical Exam  Constitutional:       General: She is awake. She is not in acute distress.     Interventions: She is intubated and restrained.   Eyes:      General: No scleral icterus.     Pupils: Pupils are equal, round, and reactive to light.   Cardiovascular:      Rate and Rhythm: Normal rate and regular rhythm.      Heart sounds: Murmur present.   Pulmonary:      Effort: She is intubated.      Breath sounds: No wheezing or rales.   Abdominal:      General: There is no distension.      Tenderness: There is no abdominal tenderness.   Musculoskeletal:      Right lower leg: No edema.      Left lower leg: No edema.   Skin:     General: Skin is warm and dry.         Atrial flutter: The patient developed atrial flutter yesterday.  At times her ventricular response rate is fast.  She has been heparinized since yesterday.  I suspect etiology of her atrial flutter secondary to her severely elevated pulmonary pressures.  Because intermittently fast ventricular response rates that are difficult to control.  Would recommend cardioversion.  She has been given a loading dose of amiodarone and is on an infusion.  She is already intubated so cardioversion should be a low risk procedure as her airway is already protected.  I have discussed with nursing and Dr. Roman and will plan cardioversion and symptoms medications are available.    Pulmonary hypertension: According to records she was short of breath for almost a week prior to admission.  Her white count on admission was 13,700.  We do not have a prior echo to compare her baseline pulmonary pressures when she is not in acute distress.    Respiratory failure: The patient is currently debated and on a ventilator.  Thus far COVID PCR  has been negative multiple times.    Aortic stenosis, mean gradient is less than 20 which puts it in the upper range of mild.  Because of her hyperdynamic LV at the time of the echo, the aortic stenosis gradient may be mildly overestimated.  Certainly it is not critical and can be followed as an outpatient.    Acute kidney injury: Kidney function is normalized since admission    Nitish Dudley M.D.   Cardiologist, [facility]  [phone]

## 2020-05-05 NOTE — THERAPY
Attempted PT evaluation. RN reports cardiology on their way for cardioversion. Will re-attempt as able.

## 2020-05-05 NOTE — ASSESSMENT & PLAN NOTE
intermittently with RVR, rate controlled except for with exertion  New onset 5/4  Recurrent 5/6-7 with recurrent respiratory failure and reintubation, complicated by requirement of norepinephrine infusion  Echo with severe pulmonary pretension and mild AS  CTA with no evidence of pulmonary embolus but dilated left atrium  Continue to optimize electrolytes  Continue apixaban and amiodarone enterally  Repeat elective cardioversion successful 5/9  Remains in sinus rhythm, contnued on amiodarone

## 2020-05-05 NOTE — PROGRESS NOTES
Dr. Sherman notified about patients rhythm changing into aflutter with slightly soft BP 90/40's. Instructed to keep patient on heparin gtt and too recheck labs. Potassium Phosphate is still running currently. Will recheck BMP and Mag when that is finished. Patient started on Midodrine. Will continue to monitor.

## 2020-05-05 NOTE — PROCEDURES
Procedures elective cardioversion with conscious sedation    Preoperative diagnosis: Atrial flutter  Postoperative diagnosis: Sinus rhythm  Procedure elective cardioversion:  The patient was intubated and on a ventilator at the time of procedure.  She was sedated using 50 mcg of fentanyl and 2 mg of intravenous Versed.  After appropriate sedation was obtained, she was cardioverted once to the 120 J synchronous mode with resultant sinus rhythm.  There were no complications.

## 2020-05-05 NOTE — CARE PLAN
Adult Ventilation Update    Total Vent Days: 6    Patient Lines/Drains/Airways Status      Active Airway       Name: Placement date: Placement time: Site: Days:    Airway ETT Oral 7.5  05/01/20 2052   Oral  6                    In the last 24 hours, the patient tolerated SBT for 1.5 hours on settings of 5/8 30%    $ FVC / Vital Capacity (liters) : 0.6 (05/05/20 0356)  NIF (cm H2O) : -20 (05/05/20 0356)  Rapid Shallow Breathing Index (RR/VT): 72 (05/05/20 0356)  Plateau Pressure: 22 (05/04/20 1838)  Static Compliance (ml / cm H2O): 34.1 (05/05/20 0218)    Patient failed trials because of    Barriers to SBT Weaning Trial Stopped due to:: Pt weaned for 1 hour and returned to rest settings per protocol (05/05/20 0356)  Length of Weaning Trial Length of Weaning Trial (Hours): 1.5 (05/05/20 0356)       Cough: Non Productive (05/05/20 0213)  Sputum Amount: Scant (05/05/20 0000)  Sputum Color: White (05/05/20 0000)  Sputum Consistency: Thick (05/05/20 0000)      Events/Summary/Plan: placed pt back on resting vent settings (05/05/20 0356)

## 2020-05-05 NOTE — CARE PLAN
Adult Ventilation Update    Total Vent Days: 4   CMV 18/310/+8/30%    Patient Lines/Drains/Airways Status    Active Airway     Name: Placement date: Placement time: Site: Days:    Airway ETT Oral 7.5  05/01/20 2052   Oral  4              In the last 12 hours, the patient tolerated SBT for 1.5hrs on settings of 5/8.    $ FVC / Vital Capacity (liters) : 0.5 (05/04/20 0500)  NIF (cm H2O) : -12 (05/04/20 0500)  Rapid Shallow Breathing Index (RR/VT): 56 (05/04/20 0500)  Plateau Pressure: 20 (05/03/20 1014)  Static Compliance (ml / cm H2O): 42.1 (05/04/20 1558)    Patient failed trials because of    Barriers to SBT Weaning Trial Stopped due to:: RSBI >105 (Rapid Shallow Breathing Index) (05/03/20 1405)  Length of Weaning Trial Length of Weaning Trial (Hours): 1 hr (05/04/20 0500)      Sputum/Suction   Cough: Non Productive (05/04/20 1600)  Sputum Amount: Large (05/03/20 2000)  Sputum Color: White;Yellow (05/03/20 2000)  Sputum Consistency: Thick (05/03/20 2000)      Events/Summary/Plan: Pt placed on spont  (05/04/20 1558)

## 2020-05-06 NOTE — FLOWSHEET NOTE
05/06/20 2765   Events/Summary/Plan   Events/Summary/Plan Patient extubated without incident to 4 L n/c.   Vital Signs   Pulse (!) 110   Respiration (!) 24   Pulse Oximetry 90 %  (Simultaneous filing. User may not have seen previous data.)   Oxygen   O2 (LPM) 4   FiO2% 30 %   O2 Delivery Device Ventilator;Silicone Nasal Cannula  (extubated to nasal cannula)

## 2020-05-06 NOTE — PROGRESS NOTES
Cardiology Follow Up Progress Note    Date of Service  5/6/2020    Attending Physician  Vish Carrington M.D.    Chief complaint: Atrial flutter    HPI  Airam Leavitt is a 80 y.o. female seen in consult yesterday for evaluation of atrial flutter.  Successfully cardioverted and remains in sinus rhythm on amiodarone infusion.  Echo showed preserved LV systolic function and severe pulmonary hypertension of uncertain cause.    She remains intubated is alert today and moderately agitated    Review of Systems  Review of Systems   Unable to perform ROS: Intubated       Vital signs in last 24 hours  Temp:  [36.9 °C (98.4 °F)-37.1 °C (98.8 °F)] 37.1 °C (98.8 °F)  Pulse:  [] 104  Resp:  [16-31] 27  BP: ()/(51-74) 108/66  SpO2:  [77 %-100 %] 94 %    Physical Exam  Physical Exam  Constitutional:       Appearance: She is not ill-appearing.   Eyes:      General: No scleral icterus.     Pupils: Pupils are equal, round, and reactive to light.   Cardiovascular:      Rate and Rhythm: Regular rhythm. Tachycardia present.      Heart sounds: Murmur present. Systolic murmur present with a grade of 2/6.   Pulmonary:      Effort: No respiratory distress.      Breath sounds: No wheezing.   Abdominal:      General: Abdomen is flat. There is no distension.      Palpations: Abdomen is soft.      Tenderness: There is no abdominal tenderness.   Musculoskeletal:      Right lower leg: No edema.      Left lower leg: No edema.   Neurological:      Mental Status: She is alert.   Psychiatric:         Behavior: Behavior is agitated.         Lab Review  Lab Results   Component Value Date/Time    WBC 10.0 05/06/2020 05:21 AM    RBC 5.05 05/06/2020 05:21 AM    HEMOGLOBIN 13.2 05/06/2020 05:21 AM    HEMATOCRIT 43.1 05/06/2020 05:21 AM    MCV 85.3 05/06/2020 05:21 AM    MCH 26.1 (L) 05/06/2020 05:21 AM    MCHC 30.6 (L) 05/06/2020 05:21 AM    MPV 11.1 05/06/2020 05:21 AM      Lab Results   Component Value Date/Time    SODIUM 139 05/06/2020  05:21 AM    POTASSIUM 4.1 05/06/2020 05:21 AM    CHLORIDE 96 05/06/2020 05:21 AM    CO2 31 05/06/2020 05:21 AM    GLUCOSE 98 05/06/2020 05:21 AM    BUN 33 (H) 05/06/2020 05:21 AM    CREATININE 0.90 05/06/2020 05:21 AM      Lab Results   Component Value Date/Time    ASTSGOT 18 05/04/2020 05:04 AM    ALTSGPT 13 05/04/2020 05:04 AM     Lab Results   Component Value Date/Time    CHOLSTRLTOT 139 06/27/2013 09:25 AM    LDL 80 06/27/2013 09:25 AM    HDL 39 (A) 06/27/2013 09:25 AM    TRIGLYCERIDE 99 05/01/2020 11:00 PM    TROPONINT 68 (H) 05/01/2020 11:00 PM       Atrial flutter: Reverted to sinus rhythm with cardioversion yesterday and remains in sinus rhythm.  Will change to oral amiodarone.  We will continue the short-term, but not long-term because of her pulmonary artery hypertension.    Anticoagulation: Patient's renal function has recovered and returned to normal.  She will be transition from heparin to apixaban.    Pulmonary hypertension: Etiology is uncertain.  She denies any cigarette abuse.  As noted above, white count admission was elevated.  When her pulmonary status stabilizes, would recommend repeat limited echo to reassess pulmonary pressures.    Aortic stenosis: Mild valve gradients by echo even with hyperdynamic LV systolic function.    Acute kidney injury: Resolved    Please contact me with any questions.    Nitish Dudley M.D.   Cardiologist, [facility]  [phone] - [phone]

## 2020-05-06 NOTE — PROGRESS NOTES
Discussed negative C-Diff results with Dr. Lal.  Per Dr. Lal, discontinue special contact isolation orders.

## 2020-05-06 NOTE — PROGRESS NOTES
"Critical Care Progress Note    Date of admission  5/1/2020    Chief Complaint  80 y.o. female admitted 5/1/2020 with increasing dyspnea, CHF versus pneumonia, severe pulmonary hypertension, intubated 5/1    Hospital Course    \"80 y.o. female who presented 5/1/2020 with respiratory failure with hypoxia.  She has a hx of hypertension, chf, aortic stenosis and was initially admitted to medical floor.  Per admission note 5 days of sob.  Worse when she lays down.  Diarrhea for 3 days.  No abdomianl pain.  Recently started on lasix outpatient due to lower ext edema and weight gain.  Transferred from [facility].  Labs were significant for renal failure with cr 2.6, troponin 0.08, wbc 12.  She has possible pneumonia on chest xray.  Covid suspected and negative test on admission.  Respiratory failure and transferred to ICU.  She had hypercapnic respiratory failure with hypoxia.  Lethargic. Intubated.  There were purulent secretions on intubation upper airway.  Bedside us showed enlarged right heart, I started on heparin drip. Avoid contrast exam for now given suspected covid of which may be reason for diarrhea.  Weaning o2 on ventilator after needing high peep. Levophed for shock.  Treating for pneumonia.  Given 500 ml IVF after intubation.  She is becoming hypervolemic at this time.  Added vasopressin.  Ongoing agitation despite propofol.  Required arterial line and central line.  No pericardial effusion, adequate LVEF at least moderate to normal function.  Started antibiotics.  Blood cx pending.  Moves all extremities and no focal deficits.\" -Dr Bhavya 5/1     5/2 -severe pulmonary pretension noted on echocardiogram, preserved LVEF.  Continuing heparin drip and will hold off on CTA until renal function improved.  Stop Versed drip, PRN pushes of fentanyl if needed and now following some commands.  5/3 -titrated off norepinephrine.   Starting Lasix, off norepinephrine, plan for CTA to rule out PE if renal " function improves tomorrow.      Interval Problem Update  Reviewed last 24 hour events:     More alert today  Remains in SR/ST  Amiodarone drip  Heparin drip  SBp 120-170s  UO good  I/Os neg 364c  Lasix  Losarten  TF goal  VENT Day#6  PEEP 10, fiO2 30%  Secretions min  CXR min change  ABX complete  C dif nTmeg  Tm 99.9  WBC 10 normal    Drop PEEP to 8  Stop Midodrine  Enteral NOAC  Enteral Amiodarone  SAT/SBT  Resume BB?    Liberation trial performed  Patient bit anxious to get ET tube and mildly tachypneic  After 1 hour CPAP weaning parameters and blood gas obtained  ABG 7.40/52/64  RSBI 63, NIF -38, VC 0.8    We will extubate  Auto BiPAP or AVAPS bridge as needed  Mobilize  Incentive spirometry  RT protocols with nebulizer treatments every 4 for now  Hemodynamics are acceptable  No further a flutter    Serial F/u  Patient initially doing well post extubation, patient seen and examined x2, reviewed with RN and subsequently decided to keep arterial line for now  Subsequently called by RN for stridor, RT administering racemic epinephrine and changing to coolmist aerosol, monitoring for need for intubation/follow-up blood gas     YESTERDAY  Follows - remains slow - more awake today  SR -> Aflutter - rate ok except w/ exertion  SBp 90-100s  Afeb, T-max 100.4  WBC 12.5  UO great  I/Os  Neg 2.15 L  TF goal  Heparin drip  Platelet count slightly down at 115, no bleeding clinically  Renal function mildly improved  COVID 2nd study neg  C diff pending from this AM  VentDay#5  PEEP 8, 30%  Weans slt better ready for liberation  CXR slt better opacities bilaterally    EKG, follow-up a flutter  Cards eval Re: Pulmonary hypertension and atrial flutter  K repletion  Resume beta-blocker, metoprolol 25 mg twice daily  Consider adding ACE inhibitor tomorrow if hemodynamics acceptable    Repeat EKG reviewed, remains in a flutter but rate acceptable    Case reviewed with cardiology at length at the bedside  We will load with  amiodarone bolus and started drip  Consider cardioversion although we both suspects she may not stay in sinus with her degree of pulmonary pretension and thus the amiodarone to help  Electrolytes optimized    Reviewed case at bedside with cardiology.  Reviewed echocardiogram and CTA along with current EKGs and hemodynamics.  Patient cardioverted successfully into sinus rhythm after completion of amnio bolus and currently on amnio drip.  Reviewed resuming home medicines for hypertension versus a flutter rate control.  Cardiology prefers resuming losartan over metoprolol at half the usual dose, orders adjusted.  Additionally will initiate anticoagulation with oral agent, Eliquis, for anticoagulation for unknown duration of atrial flutter.  Severe pulmonary pretension also fairly prudent the anticoagulated at least for now.  Doubt thromboembolic disease or valvular disease etiology for pulmonary pretension.  Suspect combination of underlying respiratory and sleep apnea issues versus less likely primary pulmonary pretension.  Follow-up echocardiogram once hemodynamically improved and off the ventilator on reasonable supplemental oxygen    Review of Systems  Review of Systems   Unable to perform ROS: Acuity of condition   Remains intubated    Vital Signs for last 24 hours   Temp:  [36.9 °C (98.4 °F)-37.1 °C (98.8 °F)] 37.1 °C (98.8 °F)  Pulse:  [] 140  Resp:  [16-36] 34  BP: ()/(51-87) 141/87  SpO2:  [77 %-100 %] 91 %    Hemodynamic parameters for last 24 hours       Respiratory Information for the last 24 hours  Vent Mode: Spont  Rate (breaths/min): 18  Vt Target (mL): 310  PEEP/CPAP: 8  P Support: 5  MAP: 12  Length of Weaning Trial (Hours): 1.25  Control VTE (exp VT): 284    Physical Exam   Physical Exam  Vitals signs and nursing note reviewed.   Constitutional:       Appearance: She is morbidly obese. She is ill-appearing (Improved). She is not diaphoretic.      Interventions: She is sedated, intubated  and restrained.      Comments: Follows a little better today   HENT:      Head: Normocephalic and atraumatic.      Right Ear: External ear normal.      Left Ear: External ear normal.      Nose: No congestion or rhinorrhea.      Mouth/Throat:      Mouth: Mucous membranes are dry.      Pharynx: No oropharyngeal exudate or posterior oropharyngeal erythema.   Eyes:      General: No scleral icterus.     Extraocular Movements: Extraocular movements intact.      Pupils: Pupils are equal, round, and reactive to light.   Neck:      Musculoskeletal: Neck supple. No neck rigidity or muscular tenderness.   Cardiovascular:      Rate and Rhythm: Tachycardia present. Rhythm irregular. Occasional extrasystoles are present.     Pulses: Normal pulses.      Heart sounds: Normal heart sounds. No murmur. No friction rub. No gallop.       Comments: Sinus rhythm  Pulmonary:      Effort: No respiratory distress. She is intubated.      Breath sounds: Decreased breath sounds (Bases) and rhonchi present. No wheezing or rales.      Comments: Intubated, diminished breath sounds  Abdominal:      General: Abdomen is protuberant. There is no distension.      Palpations: Abdomen is soft. There is no mass.      Tenderness: There is no abdominal tenderness. There is no right CVA tenderness, left CVA tenderness or guarding. Negative signs include Charles's sign.   Musculoskeletal:         General: No swelling or tenderness.      Right lower leg: Edema (Improved) present.      Left lower leg: Edema (Improved) present.   Lymphadenopathy:      Cervical: No cervical adenopathy.   Skin:     Capillary Refill: Capillary refill takes less than 2 seconds.      Coloration: Skin is not cyanotic, jaundiced or pale.      Findings: No bruising, erythema, lesion or rash.      Nails: There is no clubbing.     Neurological:      General: No focal deficit present.      Mental Status: She is easily aroused. She is lethargic.      GCS: GCS eye subscore is 4. GCS verbal  subscore is 1. GCS motor subscore is 6.      Cranial Nerves: No cranial nerve deficit.      Sensory: No sensory deficit.      Motor: No weakness.      Deep Tendon Reflexes: Reflexes normal.      Comments: Awake and follows commands and moves all 4, withdraws all extremities symmetrically, brainstem reflexes intact   Psychiatric:         Mood and Affect: Mood is anxious.         Behavior: Behavior is not agitated. Behavior is cooperative.      Comments: Unable to completely assess, intubated, Anxious to get ET tube out, cooperative       Medications  Current Facility-Administered Medications   Medication Dose Route Frequency Provider Last Rate Last Dose   • RACEPINEPHRINE HCL 2.25 % INH NEBU            • apixaban (ELIQUIS) tablet 5 mg  5 mg Enteral Tube BID Nitish Dudley M.D.   5 mg at 05/06/20 0942   • amiodarone (CORDARONE) tablet 200 mg  200 mg Enteral Tube TWICE DAILY Nitish Dudley M.D.   200 mg at 05/06/20 0941   • racepinephrine (MICRONEFRIN) 2.25 % nebulizer solution 0.5 mL  0.5 mL Nebulization Q4HRS (RT) Marcos Barboza M.D.   0.5 mL at 05/06/20 1655   • losartan (COZAAR) tablet 25 mg  25 mg Oral BID Mahad Sherman M.D.   25 mg at 05/06/20 0524   • oxyCODONE immediate-release (ROXICODONE) tablet 5 mg  5 mg Oral Q4HRS PRN Mahad Sherman M.D.        Or   • oxyCODONE immediate release (ROXICODONE) tablet 10 mg  10 mg Oral Q4HRS PRN Mahad Sherman M.D.       • furosemide (LASIX) injection 20 mg  20 mg Intravenous BID DIURETIC Vish Carrington M.D.   20 mg at 05/06/20 1525   • potassium bicarbonate (KLYTE) effervescent tablet 25 mEq  25 mEq Enteral Tube BID Vish Carrington M.D.   25 mEq at 05/06/20 0524   • Pharmacy Consult: Enteral tube insertion - review meds/change route/product selection  1 Each Other PHARMACY TO DOSE Vish Carrington M.D.       • acetaminophen (TYLENOL) tablet 650 mg  650 mg Enteral Tube Q6HRS PRN Vish Carrington M.D.       • allopurinol (ZYLOPRIM) tablet 100 mg  100 mg  Enteral Tube BID Vish Carrington M.D.   100 mg at 05/06/20 0524   • ondansetron (ZOFRAN ODT) dispertab 4 mg  4 mg Enteral Tube Q6HRS PRN Vish Carrington M.D.       • ascorbic acid tablet 500 mg  500 mg Enteral Tube DAILY Vish Carrington M.D.   500 mg at 05/06/20 0524   • cyclobenzaprine (FLEXERIL) tablet 10 mg  10 mg Enteral Tube TID PRN Vish Carrington M.D.       • Pharmacy Consult Request  1 Each Other PHARMACY TO DOSE Thad Valenzuela M.D.       • Respiratory Therapy Consult   Nebulization Continuous RT Fernandez Latif M.D.       • ipratropium-albuterol (DUONEB) nebulizer solution  3 mL Nebulization Q2HRS PRN (RT) Fernandez Latif M.D.       • famotidine (PEPCID) tablet 20 mg  20 mg Enteral Tube Q12HRS Fernandez Latif M.D.   20 mg at 05/06/20 0524    Or   • famotidine (PEPCID) injection 20 mg  20 mg Intravenous Q12HRS Fernandez Latif M.D.   20 mg at 05/06/20 1656   • MD Alert...ICU Electrolyte Replacement per Pharmacy   Other PHARMACY TO DOSE Fernandez Latif M.D.       • lidocaine (XYLOCAINE) 1 % injection 1-2 mL  1-2 mL Tracheal Tube Q30 MIN PRN Fernandez Latif M.D.       • fentaNYL (SUBLIMAZE) injection 25 mcg  25 mcg Intravenous Q HOUR PRN Fernandez Latif M.D.   25 mcg at 05/06/20 0858    Or   • fentaNYL (SUBLIMAZE) injection 50 mcg  50 mcg Intravenous Q HOUR PRN Fernandez Latif M.D.   50 mcg at 05/06/20 1150    Or   • fentaNYL (SUBLIMAZE) injection 100 mcg  100 mcg Intravenous Q HOUR PRN Fernandez Latif M.D.   100 mcg at 05/04/20 1628   • ondansetron (ZOFRAN) syringe/vial injection 4 mg  4 mg Intravenous Q6HRS PRN Fernandez Latif M.D.           Fluids    Intake/Output Summary (Last 24 hours) at 5/6/2020 1832  Last data filed at 5/6/2020 1600  Gross per 24 hour   Intake 1476.15 ml   Output 2540 ml   Net -1063.85 ml       Laboratory  Recent Labs     05/05/20  0354 05/06/20  0736 05/06/20  1315   ISTATAPH 7.437 7.414 7.419   ISTATAPCO2 52.3* 53.7* 52.0*   ISTATAPO2 78 80 64   ISTATATCO2 37* 36* 35*   [id number] 96 96 92*    ISTATARTHCO3 35.3* 34.4* 33.6*   ISTATARTBE 9* 8* 7*   ISTATTEMP 37.5 C 98.4 F 98.8 F   ISTATFIO2 30 30 30   ISTATSPEC Arterial Arterial Arterial   ISTATAPHTC 7.429 7.416 7.417   [id number] 81 80 64         Recent Labs     05/04/20 0504 05/04/20 2000 05/05/20 0415 05/06/20  0521   SODIUM 141 140 138 139   POTASSIUM 3.4* 4.4 3.8 4.1   CHLORIDE 98 95* 96 96   CO2 31 33 31 31   BUN 50* 43* 41* 33*   CREATININE 0.99 0.88 0.84 0.90   MAGNESIUM 1.7 2.0  --   --    PHOSPHORUS 1.9*  --   --   --    CALCIUM 8.7 8.8 8.7 9.0     Recent Labs     05/04/20 0504 05/04/20 1200 05/04/20 2000 05/05/20 0415 05/06/20 0521   ALTSGPT 13  --   --   --   --    ASTSGOT 18  --   --   --   --    ALKPHOSPHAT 61  --   --   --   --    TBILIRUBIN 0.6  --   --   --   --    PREALBUMIN  --  9.4*  --   --   --    GLUCOSE 126*  --  127* 126* 98     Recent Labs     05/04/20 0504 05/05/20 0415 05/06/20  0521   WBC 11.4* 12.5* 10.0   NEUTSPOLYS 77.20* 69.30 65.40   LYMPHOCYTES 11.90* 15.60* 17.60*   MONOCYTES 8.90 11.20 12.50   EOSINOPHILS 1.30 3.50 3.60   BASOPHILS 0.20 0.20 0.30   ASTSGOT 18  --   --    ALTSGPT 13  --   --    ALKPHOSPHAT 61  --   --    TBILIRUBIN 0.6  --   --      Recent Labs     05/04/20 0504 05/05/20 0415 05/06/20 0521   RBC 4.90 4.89 5.05   HEMOGLOBIN 12.8 13.2 13.2   HEMATOCRIT 43.0 41.8 43.1   PLATELETCT 132* 115* 129*   APTT 60.4* 60.2* 47.3*       Imaging  X-Ray:  I have personally reviewed the images and compared with prior images.     Echo 5/2  CONCLUSIONS  No prior study is available for comparison.   Normal left ventricular chamber size.  Left ventricular ejection fraction is visually estimated to be greater   than 75%.  Severely dilated right ventricle.  Reduced right ventricular systolic function.  Mild aortic stenosis.  Estimated right ventricular systolic pressure  is 70 mmHg    Assessment/Plan  * Acute respiratory failure with hypoxia and hypercapnia (HCC)  Assessment & Plan  Right heart failure and  sepsis/pneumonia  Suspected covid initially, PCR for COVID negative x3 now, isolation discontinued  CTA negative for PE, left lower lobe atelectasis/pneumonia suspected, diffuse viral process not seen  Transitioned heparin drip to Xarelto for a flutter and for pulmonary hypertension  On ventilator, intubated 5/1  Liberation trial 5/6, monitor for the need for reintubation/AVAPS bridge-auto BiPAP  With elevated BMI and pulmonary pretension monitor for apnea/hypoventilation post extubation  RT protocols/ventilator bundle-incentive spirometry  SAT/SBT ongoing  Mobilize post extubation when respiratory status acceptable    Pulmonary hypertension (HCC)  Assessment & Plan  RVSP 70  Significant dilation left atrium  BMI 40 query component of PREMA/OHS  Suspect chronic hypoxemia  CTA negative for PE 5/4  No clear valvular abnormalities beyond mild left ear on echo with preserved LV function  New onset atrial flutter 5/4  Continue anticoagulation for now and consult cardiology  Resuming beta-blocker then ACE inhibitor, hopefully this will help    Atrial flutter (HCC)  Assessment & Plan  Intermittently with RVR, rate controlled except for with exertion  New onset 5/4  Echo with severe pulmonary pretension and mild AS  CTA with no evidence of pulmonary embolus but dilated left atrium  Continue to optimize electrolytes  Stop midodrine, BP improved-continue to monitor for the need for pressors  Obtain cardiology consult for pulmonary hypertension/atrial flutter which I suspect may become a chronic problem for her  Heparin transition to Xarelto    Septic shock (HCC)  Assessment & Plan  This is Septic shock Present on admission  SIRS criteria identified on my evaluation include: Tachycardia, with heart rate greater than 90 BPM, Tachypnea, with respirations greater than 20 per minute and Leukocytosis, with WBC greater than 12,000  Source is likely pneumonia, + diarrhea,  covid ruled out with 3 serial negative studies  Presentation  includes: hypervolemic with heart failure, unable to give sepsis bolus  \the patient remains hypotensive with systolic blood pressure less than 90 or MAP less than 65  Hemodynamic support with additional fluids and IV vasopressors as needed to maintain a SBP of 90 or MAP of 65  IV antibiotics as appropriate for source of sepsis  Reassessment: I have reassessed the patient's hemodynamic status      Acute on chronic diastolic heart failure (HCC)  Assessment & Plan  Right heart enlarged with deviated septum on my bedside us  Echo noted, severe pulmonary pretension  Pressors as needed, limiting IV fluids  Resume losartan when hemodynamics okay-5/5, hold beta-blocker for now  COVID PCR negative x3, isolation removed  Initially treated with IV heparin, noninvasive study and CTA chest both negative for thrombus  Forced diuresis as BP allows  Morbidly obese, query OHS/PREMA  Cardiology following    Pneumonia due to infectious organism  Assessment & Plan  Purulent secretions on intubation  Possibly from aspiration or cap  Antibiotics- cefepime and doxycycline with last day of treatment 5/6  COVID PCR negative daily x3 days-CT scan does not reveal diffuse viral pneumonic process  Cultures negative so far, no sputum culture ever sent?  White count has normalized    Acute renal failure (ARF) (HCC)  Assessment & Plan  Likely from poor perfusion from sepsis/cardiac dysfunction  BUN continues to improve and creatinine remains normal  Avoid nephrotoxins as able clinically  Renally dose medications were clinically appropriate  Monitor urine output and serial BMP    Acute encephalopathy  Assessment & Plan  Back to baseline  multifactorial  Limit sedation as clinically appropriate  Monitor    Aortic stenosis  Assessment & Plan  Contributory to right heart failure?  Mild echocardiogram report  Cardiology consultation noted    Diarrhea  Assessment & Plan  Unclear etiology, doubt COVID, improved  Does not seem to have been treated with ABX  prior  No bowel movement for the first 4 days, episode of diarrhea 5/5 and stool for C. difficile sent by nursing staff, it was ordered on admission, results negative    Essential hypertension  Assessment & Plan  Keep SBp < 160  Resume home regimen when clinically appropriate including Benicar, Cozaar and Toprol-XL   Resume Cozaar first, start with split dose of half what she was taking at home, reviewed with cardiology  Continue diuresis and if hemodynamically susceptible after resuming Cozaar consider adding back low-dose beta-blocker, was on 50 mg HL once a day, consider 12.5 mg every 12H    Hypokalemia  Assessment & Plan  Replete, goal greater than 4.0, ERP  Also replete magnesium to greater than 2.0    Hypophosphatemia  Assessment & Plan  Replete, goal greater than 2.5, ERP    Suspected COVID-19 virus infection  Assessment & Plan  covid PCR x3 all negative, intermittently performed daily 5/1, 5/2 and 5/3  Diarrhea may be a sign of covid, improved  Respiratory failure, persist  CTA most consistent with left lower lobe atelectasis and small effusion, query pneumonia there, diffuse viral pneumonia or PE not seen  Okay discontinue isolation     VTE:  Heparin  Ulcer: H2 Antagonist  Lines: Central Line  Ongoing indication addressed    I have performed a physical exam and reviewed and updated ROS and Plan today (5/6/2020). In review of yesterday's note (5/5/2020), there are no changes except as documented above.     Discussed patient condition and risk of morbidity and/or mortality with RN, RT, Pharmacy, Charge nurse / hot rounds and Patient     The patient remains critically ill.  Tolerating breathing trial with acceptable blood gases and acceptable weaning parameters although vital capacity bit low and patient with hypercarbia although compensated.  Will liberate her from the ventilator with caution, low threshold to initiate AVAPS BiPAP therapy.  Will attempt to mobilize aggressively given her elevated BMI and  respiratory status/abnormal ABG.  Will transition from IV heparin to enteral anticoagulants from IV amiodarone to oral amiodarone for now.  Continuing forced diuresis with IV Lasix and on losartan for blood pressure and afterload reduction, consider resuming her beta-blocker.  Patient remains at high risk for increased morbidity mortality without above critical care interventions.    Critical care time = 45 minutes in directly providing and coordinating critical care and extensive data review.  No time overlap and excludes procedures.

## 2020-05-06 NOTE — FLOWSHEET NOTE
20 0714   Ventiliation   $ Ventilation - Subsequent Yes   Ventilator Management Group   Intensivist Group Yes   General Vent Information   Ventilator Number [id number]   Vent Mode APVCMV   Vent Alarms   Ventilation Alarms Reviewed / Activated Yes   Upper Pressure Limit Alarm 45   Lower Pressure Limit 4   Low VE Alarm 4   High Respiratory Rate Alarm 45   Low Respiratory Rate Alarm 8   Low VT Alarm 150   Apnea Parameters Checked / Activated Yes   % Leak Alarm Off   Vent Settings   FiO2% 30 %   Rate (breaths/min) 18   Vent Temp HME Yes   Vt Target (mL) 310   TI (Seconds) 0.8   PEEP/CPAP 10   Pramp 50   Trigger Type Flow Trigger   Sensitivity Setting 5   Vent Readings   PIP 16    Minute Volume 7.1   Control VTE (exp VT) 426   f Total (Breaths/Min) 25   I:E Ratio 12.0   MAP 12   PEEP/CPAP MONITORED 10   Static Compliance (ml / cm H2O) 11.2   R exp 5   Weaning Trial   Weaning Trial Initiated Yes   Length of Weaning Trial (Hours) 1.0   Weaning Parameters   $ FVC / Vital Capacity (liters)  1   NIF (cm H2O)  -30   Rapid Shallow Breathing Index (RR/VT) 65   VAP   Oral Care Mouth Swabbed   Subglottic Suctioning Device RCP's No (Comment)   Head of Bed Elevated Greater or equal to 30 degrees

## 2020-05-06 NOTE — THERAPY
Attempted PT evaluation. RN reports pt is being extubated this afternoon. Will hold for now and re-attempt later/tomorrow as able/appropriate.

## 2020-05-06 NOTE — THERAPY
Occupational Therapy Contact Note    OT order received. Pt getting extubated this afternoon, will defer eval until after extubation for most accurate assessment of capabilities.    Geovanna Gaston, OTR/L

## 2020-05-06 NOTE — CARE PLAN
Problem: Respiratory:  Goal: Respiratory status will improve  Outcome: PROGRESSING AS EXPECTED  Note: Weaning to extubate this afternoon as patient tolerates     Problem: Mobility  Goal: Risk for activity intolerance will decrease  Outcome: PROGRESSING AS EXPECTED

## 2020-05-07 NOTE — PROCEDURES
Intubation  Date/Time: 5/7/2020 2:07 AM  Performed by: Nitish Lal Jr., D.O.  Authorized by: Nitish Lal Jr., D.O.     Consent:     Consent obtained:  Emergent situation  Pre-procedure details:     Patient status:  Altered mental status    Mallampati score:  I    Pretreatment meds: etomidate.    Paralytics:  Rocuronium  Procedure details:     Preoxygenation:  Nonrebreather mask    Intubation method:  Oral    Oral intubation technique:  Video-assisted    Laryngoscope type:  GlideScope    Laryngoscope blade:  Mac 3    Cormack-Lehane Classification:  Grade 1    Tube size (mm):  7.5    Tube type:  Cuffed    Number of attempts:  1    Tube visualized through cords: yes    Placement assessment:     ETT to teeth:  25    Tube secured with:  ETT rahman    Breath sounds:  Equal    Placement verification: chest rise, condensation, CXR verification, direct visualization, equal breath sounds and ETCO2 detector      CXR findings:  ETT in proper place  Post-procedure details:     Patient tolerance of procedure:  Tolerated well, no immediate complications  Comments:      Mild periglottic edema noted

## 2020-05-07 NOTE — PROGRESS NOTES
Cortrak Placement    Tube Team verified patient name and medical record number prior to tube placement.  Cortrak tube (43 inches, 10 [place]) placed at 70 cm in left nare.  Per Cortrak picture, tube appears to be in the stomach.  Nursing Instructions: Awaiting KUB to confirm placement before use for medications or feeding. Once placement confirmed, flush tube with 30 ml of water, and then remove and save stylet, in patient medication drawer.

## 2020-05-07 NOTE — PROGRESS NOTES
"Critical Care Progress Note    Date of admission  5/1/2020    Chief Complaint  80 y.o. female admitted 5/1/2020 with increasing dyspnea, CHF versus pneumonia, severe pulmonary hypertension, intubated 5/1    Hospital Course    \"80 y.o. female who presented 5/1/2020 with respiratory failure with hypoxia.  She has a hx of hypertension, chf, aortic stenosis and was initially admitted to medical floor.  Per admission note 5 days of sob.  Worse when she lays down.  Diarrhea for 3 days.  No abdomianl pain.  Recently started on lasix outpatient due to lower ext edema and weight gain.  Transferred from [facility].  Labs were significant for renal failure with cr 2.6, troponin 0.08, wbc 12.  She has possible pneumonia on chest xray.  Covid suspected and negative test on admission.  Respiratory failure and transferred to ICU.  She had hypercapnic respiratory failure with hypoxia.  Lethargic. Intubated.  There were purulent secretions on intubation upper airway.  Bedside us showed enlarged right heart, I started on heparin drip. Avoid contrast exam for now given suspected covid of which may be reason for diarrhea.  Weaning o2 on ventilator after needing high peep. Levophed for shock.  Treating for pneumonia.  Given 500 ml IVF after intubation.  She is becoming hypervolemic at this time.  Added vasopressin.  Ongoing agitation despite propofol.  Required arterial line and central line.  No pericardial effusion, adequate LVEF at least moderate to normal function.  Started antibiotics.  Blood cx pending.  Moves all extremities and no focal deficits.\" -Dr Bhavya 5/1     5/2 -severe pulmonary pretension noted on echocardiogram, preserved LVEF.  Continuing heparin drip and will hold off on CTA until renal function improved.  Stop Versed drip, PRN pushes of fentanyl if needed and now following some commands.  5/3 -titrated off norepinephrine.   Starting Lasix, off norepinephrine, plan for CTA to rule out PE if renal " function improves tomorrow.      Interval Problem Update  Reviewed last 24 hour events:     Retubed  Early AM  Awake and follow  Weaned off Prop and Fent started -> 100  SR/AFlutter  SBp 90s  NE drip 4 -> 1  UO good  I/Os neg 918cc  Tm 99.9  WBC 16.6  TF goal  VentDay#6   PEEP 8, 60%  ABG noted  CXR mild diffuse opacities  Secretions min  Eliquis  Off ABX yesterday  Decadron  EKG QTc 545    D/c Zofran, optimize electrolytes, avoid QT prolonging agents  Add fentanyl continuous infusion and as needed IV pops and wean off propofol  If necessary employ dexmedetomidine, hemodynamically tolerates this better than propofol  Decadron - 8 doses and start assessing for cuff leak tomorrow    Case reviewed with cardiology at length at the bedside  Reviewed medical management of recurrent atrial flutter  We will need to get off norepinephrine and optimize electrolytes first before will realize good control of a flutter  Repeat cardioversion if hemodynamics become problematic but wait till clinically improved before elective conversion again    Serial follow-up  Oxygenation improved over the course the day and FiO2 weaned  Hemodynamics with labile blood pressure and heart rate  Weaned off propofol and fentanyl initiated  Norepinephrine weaned off after weaning off propofol     YESTERDAY  More alert today  Remains in SR/ST  Amiodarone drip  Heparin drip  SBp 120-170s  UO good  I/Os neg 364c  Lasix  [place]  TF goal  VENT Day#6  PEEP 10, fiO2 30%  Secretions min  CXR min change  ABX complete  C dif nTmeg  Tm 99.9  WBC 10 normal    Drop PEEP to 8  Stop Midodrine  Enteral NOAC  Enteral Amiodarone  SAT/SBT  Resume BB?    Liberation trial performed  Patient bit anxious to get ET tube and mildly tachypneic  After 1 hour CPAP weaning parameters and blood gas obtained  ABG 7.40/52/64  RSBI 63, NIF -38, VC 0.8    We will extubate  Auto BiPAP or AVAPS bridge as needed  Mobilize  Incentive spirometry  RT protocols with nebulizer treatments  every 4 for now  Hemodynamics are acceptable  No further a flutter    Serial F/u  Patient initially doing well post extubation, patient seen and examined x2, reviewed with RN and subsequently decided to keep arterial line for now  Subsequently called by RN for stridor, RT administering racemic epinephrine and changing to coolmist aerosol, monitoring for need for intubation/follow-up blood gas    Review of Systems  Review of Systems   Unable to perform ROS: Acuity of condition   Remains intubated    Vital Signs for last 24 hours   Temp:  [36.6 °C (97.9 °F)-37.2 °C (99 °F)] 36.9 °C (98.4 °F)  Pulse:  [] 81  Resp:  [17-31] 28  BP: ()/(43-91) 102/57  SpO2:  [52 %-100 %] 97 %    Hemodynamic parameters for last 24 hours       Respiratory Information for the last 24 hours  Vent Mode: APVCMV  Rate (breaths/min): 28  Vt Target (mL): 310  PEEP/CPAP: 8  MAP: 10  Control VTE (exp VT): 395    Physical Exam   Physical Exam  Vitals signs and nursing note reviewed.   Constitutional:       Appearance: She is morbidly obese. She is ill-appearing (Improved). She is not diaphoretic.      Interventions: She is sedated, intubated and restrained.      Comments: Following, somnolent with propofol   HENT:      Head: Normocephalic and atraumatic.      Right Ear: External ear normal.      Left Ear: External ear normal.      Nose: No congestion or rhinorrhea.      Mouth/Throat:      Mouth: Mucous membranes are dry.      Pharynx: No oropharyngeal exudate or posterior oropharyngeal erythema.   Eyes:      General: No scleral icterus.     Extraocular Movements: Extraocular movements intact.      Pupils: Pupils are equal, round, and reactive to light.   Neck:      Musculoskeletal: Neck supple. No neck rigidity or muscular tenderness.   Cardiovascular:      Rate and Rhythm: Tachycardia present. Rhythm irregular. Occasional extrasystoles are present.     Pulses: Normal pulses.      Heart sounds: Normal heart sounds. No murmur. No friction  rub. No gallop.       Comments: Labile rate and rhythm-SB/SR/ST/a flutter  Pulmonary:      Effort: No respiratory distress. She is intubated.      Breath sounds: Decreased breath sounds (Bases) and rhonchi present. No wheezing or rales.   Abdominal:      General: Abdomen is protuberant. There is no distension.      Palpations: Abdomen is soft. There is no mass.      Tenderness: There is no abdominal tenderness. There is no right CVA tenderness, left CVA tenderness or guarding. Negative signs include Charles's sign.   Musculoskeletal:         General: No swelling or tenderness.      Right lower leg: Edema (Improved) present.      Left lower leg: Edema (Improved) present.   Lymphadenopathy:      Cervical: No cervical adenopathy.   Skin:     Capillary Refill: Capillary refill takes less than 2 seconds.      Coloration: Skin is not cyanotic, jaundiced or pale.      Findings: No bruising, erythema, lesion or rash.      Nails: There is no clubbing.     Neurological:      General: No focal deficit present.      Mental Status: She is easily aroused. She is lethargic.      GCS: GCS eye subscore is 4. GCS verbal subscore is 1. GCS motor subscore is 6.      Cranial Nerves: No cranial nerve deficit.      Sensory: No sensory deficit.      Motor: No weakness.      Deep Tendon Reflexes: Reflexes normal.      Comments: Awake and follows commands and moves all 4, withdraws all extremities symmetrically, brainstem reflexes intact   Psychiatric:         Mood and Affect: Mood is not anxious.         Behavior: Behavior is not agitated. Behavior is cooperative.      Comments: Unable to completely assess, intubated, anxiety controlled with propofol, patient a bit somnolent/slow       Medications  Current Facility-Administered Medications   Medication Dose Route Frequency Provider Last Rate Last Dose   • ipratropium-albuterol (DUONEB) nebulizer solution  3 mL Nebulization Q4HRS (RT) Nitish Lal Jr., D.O.   3 mL at 05/07/20 1927   •  lactated ringers infusion  1,000 mL Intravenous Once TIGRE Champagne Jr..OGina   Stopped at 05/07/20 0215   • famotidine (PEPCID) tablet 20 mg  20 mg Enteral Tube Q12HRS TIGRE Champagne Jr..OGina        Or   • famotidine (PEPCID) injection 20 mg  20 mg Intravenous Q12HRS TIGRE Champagne Jr..O.   20 mg at 05/07/20 1712   • senna-docusate (PERICOLACE or SENOKOT S) 8.6-50 MG per tablet 2 Tab  2 Tab Enteral Tube BID TIGRE Champagne Jr..O.   2 Tab at 05/07/20 1712    And   • polyethylene glycol/lytes (MIRALAX) PACKET 1 Packet  1 Packet Enteral Tube QDAY PRN TIGRE Champagne Jr..O.        And   • magnesium hydroxide (MILK OF MAGNESIA) suspension 30 mL  30 mL Enteral Tube QDAY PRN LEVON Champagne Jr.OGina        And   • bisacodyl (DULCOLAX) suppository 10 mg  10 mg Rectal QDAY PRN TIGRE Champagne Jr..O.       • MD Alert...ICU Electrolyte Replacement per Pharmacy   Other PHARMACY TO DOSE TIGRE Champagne Jr..GABRIELLA.       • lidocaine (XYLOCAINE) 1 % injection 1-2 mL  1-2 mL Tracheal Tube Q30 MIN PRN TIGRE Champagne Jr..O.       • dexamethasone (DECADRON) injection 4 mg  4 mg Intravenous Q6HRS Mahad Sherman M.D.   4 mg at 05/07/20 1712   • norepinephrine (Levophed) infusion 8 mg/250 mL (premix)  0-30 mcg/min Intravenous Continuous TIGRE Champagne Jr..OGina   Stopped at 05/07/20 1500   • oxyCODONE immediate-release (ROXICODONE) tablet 5 mg  5 mg Enteral Tube Q4HRS PRN Mahad Sherman M.D.        Or   • oxyCODONE immediate release (ROXICODONE) tablet 10 mg  10 mg Enteral Tube Q4HRS PRN Mahad Sherman M.D.       • losartan (COZAAR) tablet 25 mg  25 mg Enteral Tube BID Mahad Sherman M.D.   Stopped at 05/07/20 1800   • fentaNYL (SUBLIMAZE) 50 mcg/mL in 50mL (Continuous Infusion)   Intravenous Continuous Mahad Sherman M.D. 2 mL/hr at 05/07/20 1906 100 mcg/hr at 05/07/20 1906   • Dexmedetomidine HCl-Dextrose 200MCG/50ML -5% SOLN  0.1-1.5 mcg/kg/hr Intravenous Continuous Mahad Sherman M.D.    Stopped at 05/07/20 1030   • apixaban (ELIQUIS) tablet 5 mg  5 mg Enteral Tube BID Nitish Dudley M.D.   5 mg at 05/07/20 1712   • amiodarone (CORDARONE) tablet 200 mg  200 mg Enteral Tube TWICE DAILY Nitish Dudley M.D.   200 mg at 05/07/20 1713   • furosemide (LASIX) injection 20 mg  20 mg Intravenous BID DIURETIC Vish Carrington M.D.   20 mg at 05/07/20 1544   • potassium bicarbonate (KLYTE) effervescent tablet 25 mEq  25 mEq Enteral Tube BID Vish Carrington M.D.   25 mEq at 05/07/20 1712   • Pharmacy Consult: Enteral tube insertion - review meds/change route/product selection  1 Each Other PHARMACY TO DOSE Vish Carrington M.D.       • acetaminophen (TYLENOL) tablet 650 mg  650 mg Enteral Tube Q6HRS PRN Vish Carrington M.D.       • allopurinol (ZYLOPRIM) tablet 100 mg  100 mg Enteral Tube BID Vish Carrington M.D.   100 mg at 05/07/20 1712   • ascorbic acid tablet 500 mg  500 mg Enteral Tube DAILY Vish Carrington M.D.   500 mg at 05/07/20 0511   • cyclobenzaprine (FLEXERIL) tablet 10 mg  10 mg Enteral Tube TID PRN Vish Carrington M.D.       • Respiratory Therapy Consult   Nebulization Continuous RT Fernandez Latif M.D.       • ipratropium-albuterol (DUONEB) nebulizer solution  3 mL Nebulization Q2HRS PRN (RT) Fernandez Latif M.D.       • fentaNYL (SUBLIMAZE) injection 25 mcg  25 mcg Intravenous Q HOUR PRN Fernandez Latif M.D.   25 mcg at 05/07/20 0237    Or   • fentaNYL (SUBLIMAZE) injection 50 mcg  50 mcg Intravenous Q HOUR PRN Fernandez Latif M.D.   50 mcg at 05/06/20 1150    Or   • fentaNYL (SUBLIMAZE) injection 100 mcg  100 mcg Intravenous Q HOUR PRN Fernandez Latif M.D.   100 mcg at 05/07/20 1809       Fluids    Intake/Output Summary (Last 24 hours) at 5/7/2020 2037  Last data filed at 5/7/2020 2000  Gross per 24 hour   Intake 1455.26 ml   Output 1590 ml   Net -134.74 ml       Laboratory  Recent Labs     05/06/20 2025 05/06/20  2346 05/07/20  0337   ISTATAPH 7.271* 7.327* 7.569*   ISTATAPCO2 82.6* 71.0* 40.1*    ISTATAPO2 126* 140* 284*   ISTATATCO2 40* 39* 38*   [id number] 98 99 100*   ISTATARTHCO3 38.0* 37.2* 36.6*   ISTATARTBE 7* 8* 13*   ISTATTEMP 37.5 C 98.0 F 37.5 C   ISTATFIO2 100 100 100   ISTATSPEC Arterial Arterial Arterial   ISTATAPHTC 7.264* 7.332* 7.561*   [id number] 129* 138* 286*         Recent Labs     05/05/20 0415 05/06/20  0521 05/07/20  0243   SODIUM 138 139 137   POTASSIUM 3.8 4.1 4.2   CHLORIDE 96 96 94*   CO2 31 31 30   BUN 41* 33* 36*   CREATININE 0.84 0.90 0.99   CALCIUM 8.7 9.0 9.3     Recent Labs     05/05/20 0415 05/06/20 0521 05/07/20  0243   GLUCOSE 126* 98 199*     Recent Labs     05/05/20 0415 05/06/20 0521 05/07/20  0243   WBC 12.5* 10.0 16.6*   NEUTSPOLYS 69.30 65.40 92.60*   LYMPHOCYTES 15.60* 17.60* 3.10*   MONOCYTES 11.20 12.50 3.60   EOSINOPHILS 3.50 3.60 0.00   BASOPHILS 0.20 0.30 0.10     Recent Labs     05/05/20 0415 05/06/20 0521 05/07/20  0243   RBC 4.89 5.05 4.94   HEMOGLOBIN 13.2 13.2 13.3   HEMATOCRIT 41.8 43.1 43.4   PLATELETCT 115* 129* 128*   APTT 60.2* 47.3*  --        Imaging  X-Ray:  I have personally reviewed the images and compared with prior images.     Echo 5/2  CONCLUSIONS  No prior study is available for comparison.   Normal left ventricular chamber size.  Left ventricular ejection fraction is visually estimated to be greater   than 75%.  Severely dilated right ventricle.  Reduced right ventricular systolic function.  Mild aortic stenosis.  Estimated right ventricular systolic pressure  is 70 mmHg    Assessment/Plan  * Acute respiratory failure with hypoxia and hypercapnia (HCC)  Assessment & Plan  Initially intubated 5/1  Right heart failure and sepsis/pneumonia  Suspected covid initially, PCR for COVID negative x3 now, isolation discontinued  CTA negative for PE, left lower lobe atelectasis/pneumonia suspected, diffuse viral process not seen  Transitioned heparin drip to Xarelto for a flutter and for pulmonary hypertension  Extubation 5/6, failed with  stridor and reintubated early a.m. 5/7  Decadron 4 mg IV every 6x8 doses  Monitor cuff leak test daily  Probable SBT's again a.m. 5/9  Early bridged with AVAPS with next extubation  Consider nebulized lidocaine and/or racemic epi prophylactically with next extubation  RT protocols/ventilator bundle  SAT/SBT as clinically appropriate  Mobilize as hemodynamics and respiratory status allow    Pulmonary hypertension (HCC)  Assessment & Plan  RVSP 70  Significant dilation left atrium  BMI 40 query component of PREMA/OHS  Suspect chronic hypoxemia  CTA negative for PE 5/4  No clear valvular abnormalities beyond mild left ear on echo with preserved LV function  New onset atrial flutter 5/4, recurrent 5/6-7  Continue apixaban  Cardiology consulting  Resuming Cozaar, holding beta-blocker    Atrial flutter (HCC)  Assessment & Plan  Intermittently with RVR, rate controlled except for with exertion  New onset 5/4  Recurrent 5/6-7 with recurrent respiratory failure and reintubation, complicated by requirement of norepinephrine infusion  Echo with severe pulmonary pretension and mild AS  CTA with no evidence of pulmonary embolus but dilated left atrium  Continue to optimize electrolytes  Wean norepinephrine  Continue apixaban and amiodarone  Discussed use of beta-blockers for rate control if needed, alternatively if BP low consider digoxin  Monitor for the need to cardiovert again, no for now    Septic shock (HCC)  Assessment & Plan  This is Septic shock Present on admission  SIRS criteria identified on my evaluation include: Tachycardia, with heart rate greater than 90 BPM, Tachypnea, with respirations greater than 20 per minute and Leukocytosis, with WBC greater than 12,000  Source is likely pneumonia, + diarrhea,  covid ruled out with 3 serial negative studies  Presentation includes: hypervolemic with heart failure, unable to give sepsis bolus  \the patient remains hypotensive with systolic blood pressure less than 90 or MAP  less than 65  Hemodynamic support with additional fluids and IV vasopressors as needed to maintain a SBP of 90 or MAP of 65  IV antibiotics as appropriate for source of sepsis  Reassessment: I have reassessed the patient's hemodynamic status      Acute on chronic diastolic heart failure (HCC)  Assessment & Plan  Right heart enlarged with deviated septum on initial bedside us  Echo noted, severe pulmonary pretension  Titrating norepinephrine infusion intermittently, limiting IV fluids, will avoid propofol for sedation due to to negative effects on BP  Resume losartan when hemodynamics okay-5/5, hold beta-blocker for now  COVID PCR negative x3, isolation removed  Initially treated with IV heparin, noninvasive study and CTA chest both negative for thrombus  Forced diuresis as BP allows  Morbidly obese, query OHS/PREMA  Cardiology following, reviewing with them daily    Pneumonia due to infectious organism  Assessment & Plan  Purulent secretions on intubation  Possibly from aspiration or cap  Antibiotics- cefepime and doxycycline with last day of treatment 5/6  COVID PCR negative daily x3 days-CT scan does not reveal diffuse viral pneumonic process  Cultures negative so far, no sputum culture ever sent?  WBC increased after initiation Decadron, monitor for HCAP    Acute renal failure (ARF) (HCC)  Assessment & Plan  Likely from poor perfusion from sepsis/cardiac dysfunction  BUN slightly increased and creatinine remains normal  Avoid nephrotoxins as able clinically  Renally dose medications were clinically appropriate  Monitor urine output and serial BMP    Acute encephalopathy  Assessment & Plan  Sedate once again on the ventilator  multifactorial  Limit sedation as clinically appropriate, transition from propofol to fentanyl and/or dexmedetomidine infusions  Monitor    Aortic stenosis  Assessment & Plan  Contributory to right heart failure?  Mild echocardiogram report  Cardiology consultation  noted    Diarrhea  Assessment & Plan  Unclear etiology, doubt COVID, improved  Does not seem to have been treated with ABX prior  No bowel movement for the first 4 days, episode of diarrhea 5/5 and stool for C. difficile sent by nursing staff, it was ordered on admission, results negative  Continue to monitor    Essential hypertension  Assessment & Plan  Keep SBp < 160  Resume home regimen when clinically appropriate including Benicar, Cozaar and Toprol-XL   Resume Cozaar first, start with split dose of half what she was taking at home, reviewed with cardiology  Continue diuresis and if hemodynamically susceptible after resuming Cozaar consider adding back low-dose beta-blocker, was on 50 mg HL once a day, consider 12.5 mg every 12H if hemodynamics are acceptable    Hypokalemia  Assessment & Plan  Replete, goal greater than 4.0, ERP  Also replete magnesium to greater than 2.0    Hypophosphatemia  Assessment & Plan  Replete, goal greater than 2.5, ERP    Suspected COVID-19 virus infection  Assessment & Plan  covid PCR x3 all negative, intermittently performed daily 5/1, 5/2 and 5/3  Diarrhea may be a sign of covid, improved  Respiratory failure, persist  CTA most consistent with left lower lobe atelectasis and small effusion, query pneumonia there, diffuse viral pneumonia or PE not seen  Okay discontinue isolation     VTE:  Heparin  Ulcer: H2 Antagonist  Lines: Central Line  Ongoing indication addressed    I have performed a physical exam and reviewed and updated ROS and Plan today (5/7/2020). In review of yesterday's note (5/6/2020), there are no changes except as documented above.     Discussed patient condition and risk of morbidity and/or mortality with RN, RT, Pharmacy, Charge nurse / hot rounds and Patient     The patient remains critically ill.  Patient developed stridor a bit late post extubation and subsequently became agitated and pulled out BiPAP resulting in worsening hypoxemia requiring reintubation.   Decadron therapy initiated.  Oxygenation improved this a.m.  Patient hemodynamically a bit compromised since she went back and a flutter and had hypotension requiring norepinephrine which in part may have been related to sedation with propofol.  Transition to dexmedetomidine and fentanyl and propofol and norepinephrine weaned off after active titration today.  Patient remains at high risk for increased morbidity and mortality without above critical care interventions.    Critical care time = 50 minutes in directly providing and coordinating critical care and extensive data review.  No time overlap and excludes procedures.

## 2020-05-07 NOTE — CARE PLAN
Problem: Venous Thromboembolism (VTW)/Deep Vein Thrombosis (DVT) Prevention:  Goal: Patient will participate in Venous Thrombosis (VTE)/Deep Vein Thrombosis (DVT)Prevention Measures  Outcome: PROGRESSING AS EXPECTED  Flowsheets  Taken 5/7/2020 0800  Mechanical Prophylaxis: SCDs, Sequential Compression Device  Taken 5/7/2020 0942  Pharmacologic Prophylaxis Used: Apixaban     Problem: Fluid Volume:  Goal: Will maintain balanced intake and output  Outcome: PROGRESSING AS EXPECTED  Intervention: Monitor, educate, and encourage compliance with therapeutic intake of liquids  Note: Strict I&O.  Bridges catheter in place.     Problem: Pain Management  Goal: Pain level will decrease to patient's comfort goal  Outcome: PROGRESSING AS EXPECTED  Note: Q2hr pain assessments using CPOT scale.  Pain medication administered as necessary and as prescribed per the medication regimen.

## 2020-05-07 NOTE — CARE PLAN
Problem: Oxygenation:  Goal: Maintain adequate oxygenation dependent on patient condition  Outcome: NOT MET     Problem: Bronchoconstriction:  Goal: Improve in air movement and diminished wheezing  Outcome: NOT MET       Vent Day 10.  CMV 24, 310, +8 and 40%   Duonebs given via aerogen Q4.    No spont trial today, patient was re intubated early this morning.    Plan Continue mechanical ventilation X 48 hours then possibly extubating patient

## 2020-05-07 NOTE — PROGRESS NOTES
Pulmonary/Critical Care Medicine   Progress Note    Date of service: 5/7/2020  Time: 2:13 AM    Called to bedside emergently by RN for patient acutely worsened after removing BiPAP mask.  Oxygen saturation in the low 50s, patient clearly encephalopathic and grabbing at objects nonsensically stating she wanted her Bridges removed.  Respiratory rate increased to the 40s and 50s and work of breathing worsened.  Patient was emergently intubated for mechanical ventilation, ventilation and sedation orders placed.  Mild periglottic edema noted, will start scheduled steroids to be continued until repeat extubation attempt.    Nitish Lal Jr., TIGRE.O.  [facility]

## 2020-05-07 NOTE — PROGRESS NOTES
Cardiology Follow Up Progress Note    Date of Service  5/7/2020    Attending Physician  Vish Carrington M.D.    Chief Complaint   Atrial fib and atrial flutter    HPI  Airam Leavitt is a 80 y.o. female admitted 5/1/2020 with respiratory failure and was seen in consultation for atrial flutter.  She was cardioverted from atrial flutter to sinus rhythm successfully on the fifth.  Yesterday, she was extubated briefly but then had progressive respiratory failure and required reintubation.  She developed atrial fibrillation about 6:00 this morning.  Currently rate is controlled.  She remains on amiodarone and anticoagulation.      Review of Systems  Review of Systems   Unable to perform ROS: Intubated       Vital signs in last 24 hours  Temp:  [36.6 °C (97.9 °F)-36.8 °C (98.2 °F)] 36.6 °C (97.9 °F)  Pulse:  [] 79  Resp:  [16-36] 25  BP: ()/(43-91) 101/54  SpO2:  [52 %-100 %] 100 %    Physical Exam  Physical Exam  Constitutional:       Interventions: She is sedated and intubated.   Cardiovascular:      Rate and Rhythm: Normal rate. Rhythm irregular.   Pulmonary:      Effort: No respiratory distress. She is intubated.      Breath sounds: No wheezing or rales.   Abdominal:      General: There is no distension.      Palpations: Abdomen is soft.   Musculoskeletal:      Right lower leg: No edema.      Left lower leg: No edema.   Skin:     General: Skin is warm and dry.         Lab Review  Lab Results   Component Value Date/Time    WBC 16.6 (H) 05/07/2020 02:43 AM    RBC 4.94 05/07/2020 02:43 AM    HEMOGLOBIN 13.3 05/07/2020 02:43 AM    HEMATOCRIT 43.4 05/07/2020 02:43 AM    MCV 87.9 05/07/2020 02:43 AM    MCH 26.9 (L) 05/07/2020 02:43 AM    MCHC 30.6 (L) 05/07/2020 02:43 AM    MPV 11.2 05/07/2020 02:43 AM      Lab Results   Component Value Date/Time    SODIUM 137 05/07/2020 02:43 AM    POTASSIUM 4.2 05/07/2020 02:43 AM    CHLORIDE 94 (L) 05/07/2020 02:43 AM    CO2 30 05/07/2020 02:43 AM    GLUCOSE 199 (H)  05/07/2020 02:43 AM    BUN 36 (H) 05/07/2020 02:43 AM    CREATININE 0.99 05/07/2020 02:43 AM      Lab Results   Component Value Date/Time    ASTSGOT 18 05/04/2020 05:04 AM    ALTSGPT 13 05/04/2020 05:04 AM     Lab Results   Component Value Date/Time    CHOLSTRLTOT 139 06/27/2013 09:25 AM    LDL 80 06/27/2013 09:25 AM    HDL 39 (A) 06/27/2013 09:25 AM    TRIGLYCERIDE 99 05/01/2020 11:00 PM    TROPONINT 68 (H) 05/01/2020 11:00 PM       No results for input(s): NTPROBNP in the last 72 hours.  Atrial fibrillation.  Patient the onset of atrial fibrillation about 6 this morning.  Rate is controlled.  Because of her severe underlying pulmonary hypertension and respiratory disease, it will probably be difficult to keep her in sinus rhythm.  Would favor rate control for the moment.  Continue amiodarone as this will also help with rate control and may help her revert to sinus rhythm.    Anticoagulation: She can be changed to oral anticoagulant    Pulmonary hypertension: Etiology of this remains uncertain.  There is no history of cigarette smoking.  We will reevaluate pulmonary pressures when she is more stable    Acute kidney injury: Resolved    History of hypertension: She was on losartan 100 mg and metoprolol prior to admission.  Nitish Dudley M.D.   Cardiologist, [facility]  [phone]

## 2020-05-07 NOTE — THERAPY
"Physical Therapy Evaluation completed.   Bed Mobility:  Supine to Sit: Maximal Assist  Transfers: Sit to Stand: Unable to Participate   Gait: Level Of Assist: Unable to Participate   Plan of Care: Will benefit from Physical Therapy 3 times per week  Discharge Recommendations: Equipment: Will Continue to Assess for Equipment Needs. Post-acute therapy Discharge to a transitional care facility for continued skilled therapy services.    See \"Rehab Therapy-Acute\" Patient Summary Report for complete documentation.     80 year old female who was admitted due to Acute on chronic DHF, renal failure, respiratory failure, encephalopathy. PMHx includes HTN and aortic stenosis.  Patient was extubated yesterday, however had to be intubated this morning secondary to patient significant desaturation.  Patient demonstrates limitations in activity tolerance, strength and balance which are likely contributing to patient's poor functional mobility. Patient able to perform bed mobility and demonstrates good core strength and postural control in sitting.  Patient did experience whole body tremor after sitting > 5 minutes, patient unable to state if this baseline, vitals remain steady, however returned to bed as patient fatiguing.  Tremors stopped upon return to bed.  Expect patient to make good progress once she is able to be extubated.  As of not, patient is not safe to go home independent, but demonstrates good potential.  Recommend discharge to post acute facility prior to being d/c home at this time.         Hany Almaguer PT  "

## 2020-05-07 NOTE — DIETARY
Nutrition support weekly update:  Day 6 of admit.  Grace Leavitt is a 80 y.o. female with admitting DX of CHF exacerbation (HCC).    Tube feeding initiated on 5/2.  Current TF via gastric Cortrak: Peptamen Intense VHP @ goal rate 45 ml/hr, providing 1080 kcal, 99 grams protein, and 907 ml free water per day.    Assessment:  Weight 94.8 kg with BMI 38.23  Current weight is consistent with admit weight.  -9.6 L fluid per I/O.    Estimated needs:  REE per [facility] = 1383 kcal/day  RMR per [facility] (vent L/min 7.4, T max/24 hours 38.3) = 1625 kcal/day (65-70% = 5005-4184 kcal/day)  11-14 kcal/kg = 9644-3496 kcal/day  2.0 grams protein/kg IBW (50 kg) = 100 grams protein/day    Evaluation:   1. Pt was extubated 5/6, but required re-intubation early this morning.   2. TF @ goal, meeting estimated needs.   3. Labs reviewed. Glucose 199. Pre-albumin and CRP worse from 5/3 to 5/4.   4. Meds include decadron, lasix, electrolyte replacement, and bowel regimen.   5. Small BM today.  6. Current feeding regimen remains appropriate. Formula is low-CHO, providing just 82 grams/day @ goal.     Malnutrition risk: None identified @ this time.    Recommendations/Plan:  1. Continue TF formula and rate.  2. Fluids per MD.  3. Monitor glucose and maintain levels <180 mg/dL.     RD following.

## 2020-05-08 NOTE — CARE PLAN
Problem: Ventilation Defect:  Goal: Ability to achieve and maintain unassisted ventilation or tolerate decreased levels of ventilator support  Outcome: NOT MET   Vent day 7  24/310/+8, 30%  SBT performed

## 2020-05-08 NOTE — PROGRESS NOTES
"Critical Care Progress Note    Date of admission  5/1/2020    Chief Complaint  80 y.o. female admitted 5/1/2020 with increasing dyspnea, CHF versus pneumonia, severe pulmonary hypertension, intubated 5/1    Hospital Course    \"80 y.o. female who presented 5/1/2020 with respiratory failure with hypoxia.  She has a hx of hypertension, chf, aortic stenosis and was initially admitted to medical floor.  Per admission note 5 days of sob.  Worse when she lays down.  Diarrhea for 3 days.  No abdomianl pain.  Recently started on lasix outpatient due to lower ext edema and weight gain.  Transferred from [facility].  Labs were significant for renal failure with cr 2.6, troponin 0.08, wbc 12.  She has possible pneumonia on chest xray.  Covid suspected and negative test on admission.  Respiratory failure and transferred to ICU.  She had hypercapnic respiratory failure with hypoxia.  Lethargic. Intubated.  There were purulent secretions on intubation upper airway.  Bedside us showed enlarged right heart, I started on heparin drip. Avoid contrast exam for now given suspected covid of which may be reason for diarrhea.  Weaning o2 on ventilator after needing high peep. Levophed for shock.  Treating for pneumonia.  Given 500 ml IVF after intubation.  She is becoming hypervolemic at this time.  Added vasopressin.  Ongoing agitation despite propofol.  Required arterial line and central line.  No pericardial effusion, adequate LVEF at least moderate to normal function.  Started antibiotics.  Blood cx pending.  Moves all extremities and no focal deficits.\" -Dr Latif 5/1         Interval Problem Update  Reviewed last 24 hour events:     Case reviewed with cardiology, hold off on repeat cardioversion till she is proved clinically and perhaps off the ventilator, continuing anticoagulation and amiodarone with gentle forced diuresis    Awake and follows  Fentanyl 150  Aflutter 80-130s  SBp 120s  IV Metoprolol x one  Off NE " drip  UO good  I/Os + 2.6  Lasix  TF goal  Tm 99.5  WBC 20 - steroids  VentDay#7  PEEP 8, 30%  CXR looks better  PLt 144, improved  Renal function slt worse  Lasix BiD  Enteral Amiodarone  Decadron    Cuff baptiste eval daily  SBT in AM  Continue forced diuresis but reduce K/lasix doses  PT/OT eval and treat  Mg/Phos/K repletion  Monitor for need to repeat cardioversion  Continue amiodarone    Serial follow-up  Heart rate control but still in flutter  Blood pressure bit labile, down to 90 at times  Mentation remains good, attempting to wean off fentanyl       YESTERDAY  Retubed  Early AM  Awake and follow  Weaned off Prop and Fent started -> 100  SR/AFlutter  SBp 90s  NE drip 4 -> 1  UO good  I/Os neg 918cc  Tm 99.9  WBC 16.6  TF goal  VentDay#6   PEEP 8, 60%  ABG noted  CXR mild diffuse opacities  Secretions min  Eliquis  Off ABX yesterday  Decadron  EKG QTc 545    D/c Zofran, optimize electrolytes, avoid QT prolonging agents  Add fentanyl continuous infusion and as needed IV pops and wean off propofol  If necessary employ dexmedetomidine, hemodynamically tolerates this better than propofol  Decadron - 8 doses and start assessing for cuff leak tomorrow    Case reviewed with cardiology at length at the bedside  Reviewed medical management of recurrent atrial flutter  We will need to get off norepinephrine and optimize electrolytes first before will realize good control of a flutter  Repeat cardioversion if hemodynamics become problematic but wait till clinically improved before elective conversion again    Serial follow-up  Oxygenation improved over the course the day and FiO2 weaned  Hemodynamics with labile blood pressure and heart rate  Weaned off propofol and fentanyl initiated  Norepinephrine weaned off after weaning off propofol    Review of Systems  Review of Systems   Unable to perform ROS: Acuity of condition   Remains intubated    Vital Signs for last 24 hours   Temp:  [36.7 °C (98.1 °F)-37.4 °C (99.3 °F)]  37.4 °C (99.3 °F)  Pulse:  [] 97  Resp:  [14-30] 20  BP: ()/(46-86) 96/60  SpO2:  [91 %-98 %] 93 %    Hemodynamic parameters for last 24 hours       Respiratory Information for the last 24 hours  Vent Mode: APVCMV  Rate (breaths/min): 20  Vt Target (mL): 310  PEEP/CPAP: 8  P Support: 5  MAP: 11  Length of Weaning Trial (Hours): 1.5  Control VTE (exp VT): 351    Physical Exam   Physical Exam  Vitals signs and nursing note reviewed.   Constitutional:       Appearance: She is morbidly obese. She is ill-appearing (Improved). She is not toxic-appearing or diaphoretic.      Interventions: She is sedated, intubated and restrained.      Comments: Following, somnolent with propofol   HENT:      Head: Normocephalic and atraumatic.      Right Ear: External ear normal.      Left Ear: External ear normal.      Nose: No congestion or rhinorrhea.      Mouth/Throat:      Mouth: Mucous membranes are dry.      Pharynx: No oropharyngeal exudate or posterior oropharyngeal erythema.   Eyes:      General: No scleral icterus.     Extraocular Movements: Extraocular movements intact.      Pupils: Pupils are equal, round, and reactive to light.   Neck:      Musculoskeletal: Neck supple. No neck rigidity or muscular tenderness.   Cardiovascular:      Rate and Rhythm: Tachycardia present. Rhythm irregular. Occasional extrasystoles are present.     Pulses: Normal pulses.      Heart sounds: Normal heart sounds. No murmur. No friction rub. No gallop.       Comments: A flutter  Pulmonary:      Effort: No respiratory distress. She is intubated.      Breath sounds: Decreased breath sounds (Bases) and rhonchi (bilateral) present. No wheezing or rales.   Abdominal:      General: Abdomen is protuberant. There is no distension.      Palpations: Abdomen is soft. There is no mass.      Tenderness: There is no abdominal tenderness. There is no right CVA tenderness, left CVA tenderness or guarding. Negative signs include Charles's sign.    Musculoskeletal:         General: No swelling or tenderness.      Right lower leg: Edema (Improved) present.      Left lower leg: Edema (Improved) present.   Lymphadenopathy:      Cervical: No cervical adenopathy.   Skin:     Capillary Refill: Capillary refill takes less than 2 seconds.      Coloration: Skin is not cyanotic, jaundiced or pale.      Findings: No bruising, erythema, lesion or rash.      Nails: There is no clubbing.     Neurological:      General: No focal deficit present.      Mental Status: She is easily aroused. She is lethargic.      GCS: GCS eye subscore is 4. GCS verbal subscore is 1. GCS motor subscore is 6.      Cranial Nerves: No cranial nerve deficit.      Sensory: No sensory deficit.      Motor: No weakness.      Deep Tendon Reflexes: Reflexes normal.      Comments: Awake and follows commands and moves all 4, withdraws all extremities symmetrically, brainstem reflexes intact   Psychiatric:         Mood and Affect: Mood is not anxious.         Behavior: Behavior is not agitated. Behavior is cooperative.      Comments: Unable to assess completely, off heavy sedation now unable to follow       Medications  Current Facility-Administered Medications   Medication Dose Route Frequency Provider Last Rate Last Dose   • Metoprolol Tartrate (LOPRESSOR) injection 5 mg  5 mg Intravenous Q5 MIN PRN Oziel Singh M.D.   5 mg at 05/08/20 0543   • [START ON 5/9/2020] famotidine (PEPCID) tablet 20 mg  20 mg Enteral Tube DAILY Mahad Sherman M.D.       • [START ON 5/9/2020] potassium bicarbonate (KLYTE) effervescent tablet 25 mEq  25 mEq Enteral Tube DAILY Mahad Sherman M.D.       • [START ON 5/9/2020] furosemide (LASIX) injection 20 mg  20 mg Intravenous Q DAY Mahad Sherman M.D.       • ipratropium-albuterol (DUONEB) nebulizer solution  3 mL Nebulization Q4HRS (RT) Nitish Lal Jr., D.O.   3 mL at 05/08/20 1049   • senna-docusate (PERICOLACE or SENOKOT S) 8.6-50 MG per tablet 2 Tab  2 Tab  Enteral Tube BID LEVON Champagne Jr.OGina   2 Tab at 05/08/20 0508    And   • polyethylene glycol/lytes (MIRALAX) PACKET 1 Packet  1 Packet Enteral Tube QDAY PRN Nitish Lal Jr., D.O.        And   • magnesium hydroxide (MILK OF MAGNESIA) suspension 30 mL  30 mL Enteral Tube QDAY PRN TIGRE Champagne Jr..OGina        And   • bisacodyl (DULCOLAX) suppository 10 mg  10 mg Rectal QDAY PRN TIGRE Champagne Jr..O.       • MD Alert...ICU Electrolyte Replacement per Pharmacy   Other PHARMACY TO DOSE TIGRE Champagne Jr..GABRIELLA.       • lidocaine (XYLOCAINE) 1 % injection 1-2 mL  1-2 mL Tracheal Tube Q30 MIN PRN TIGRE Champagne Jr..OGina   1 mL at 05/07/20 2328   • dexamethasone (DECADRON) injection 4 mg  4 mg Intravenous Q6HRS Mahad Sherman M.D.   4 mg at 05/08/20 1249   • norepinephrine (Levophed) infusion 8 mg/250 mL (premix)  0-30 mcg/min Intravenous Continuous TIGRE Champagne Jr..OGina   Stopped at 05/07/20 1500   • oxyCODONE immediate-release (ROXICODONE) tablet 5 mg  5 mg Enteral Tube Q4HRS PRN Mahad Sherman M.D.        Or   • oxyCODONE immediate release (ROXICODONE) tablet 10 mg  10 mg Enteral Tube Q4HRS PRN Mahad Sherman M.D.       • losartan (COZAAR) tablet 25 mg  25 mg Enteral Tube BID Mahad Sherman M.D.   25 mg at 05/08/20 0509   • fentaNYL (SUBLIMAZE) 50 mcg/mL in 50mL (Continuous Infusion)   Intravenous Continuous Mahad Sherman M.D. 3 mL/hr at 05/08/20 0713 150 mcg/hr at 05/08/20 0713   • Dexmedetomidine HCl-Dextrose 200MCG/50ML -5% SOLN  0.1-1.5 mcg/kg/hr Intravenous Continuous Mahad Sherman M.D.   Stopped at 05/07/20 1030   • apixaban (ELIQUIS) tablet 5 mg  5 mg Enteral Tube BID Nitish Dudley M.D.   5 mg at 05/08/20 0509   • amiodarone (CORDARONE) tablet 200 mg  200 mg Enteral Tube TWICE DAILY Nitish Dudley M.D.   200 mg at 05/08/20 0509   • Pharmacy Consult: Enteral tube insertion - review meds/change route/product selection  1 Each Other PHARMACY TO DOSE Vish ALBERTS  JOHAN Carrington       • acetaminophen (TYLENOL) tablet 650 mg  650 mg Enteral Tube Q6HRS PRN Vish Carrington M.D.       • allopurinol (ZYLOPRIM) tablet 100 mg  100 mg Enteral Tube BID Vish Carrington M.D.   100 mg at 05/08/20 0509   • ascorbic acid tablet 500 mg  500 mg Enteral Tube DAILY Vish Carrington M.D.   500 mg at 05/08/20 0509   • cyclobenzaprine (FLEXERIL) tablet 10 mg  10 mg Enteral Tube TID PRN Vish Carrington M.D.       • Respiratory Therapy Consult   Nebulization Continuous RT Fernandez Latif M.D.       • ipratropium-albuterol (DUONEB) nebulizer solution  3 mL Nebulization Q2HRS PRN (RT) Fernandez Latif M.D.       • fentaNYL (SUBLIMAZE) injection 25 mcg  25 mcg Intravenous Q HOUR PRN Fernandez Latif M.D.   25 mcg at 05/07/20 0237    Or   • fentaNYL (SUBLIMAZE) injection 50 mcg  50 mcg Intravenous Q HOUR PRN Fernandez Latif M.D.   50 mcg at 05/06/20 1150    Or   • fentaNYL (SUBLIMAZE) injection 100 mcg  100 mcg Intravenous Q HOUR PRN Fernandez Latif M.D.   100 mcg at 05/07/20 1809       Fluids    Intake/Output Summary (Last 24 hours) at 5/8/2020 1422  Last data filed at 5/8/2020 1200  Gross per 24 hour   Intake 1765.86 ml   Output 1150 ml   Net 615.86 ml       Laboratory  Recent Labs     05/06/20  2346 05/07/20  0337 05/08/20  0301   ISTATAPH 7.327* 7.569* 7.552*   ISTATAPCO2 71.0* 40.1* 34.9   ISTATAPO2 140* 284* 98*   ISTATATCO2 39* 38* 32   [id number] 99 100* 98   ISTATARTHCO3 37.2* 36.6* 30.7*   ISTATARTBE 8* 13* 8*   ISTATTEMP 98.0 F 37.5 C 37.4 C   ISTATFIO2 100 100 40   ISTATSPEC Arterial Arterial Arterial   ISTATAPHTC 7.332* 7.561* 7.546*   [id number] 138* 286* 101*         Recent Labs     05/06/20 0521 05/07/20 0243 05/08/20 0414   SODIUM 139 137 139   POTASSIUM 4.1 4.2 3.8   CHLORIDE 96 94* 94*   CO2 31 30 29   BUN 33* 36* 53*   CREATININE 0.90 0.99 1.18   MAGNESIUM  --   --  2.0   PHOSPHORUS  --   --  2.4*   CALCIUM 9.0 9.3 9.8     Recent Labs     05/06/20 0521 05/07/20 0243 05/08/20  0414    GLUCOSE 98 199* 174*     Recent Labs     05/06/20  0521 05/07/20  0243 05/08/20  0414   WBC 10.0 16.6* 20.4*   NEUTSPOLYS 65.40 92.60* 90.70*   LYMPHOCYTES 17.60* 3.10* 3.40*   MONOCYTES 12.50 3.60 4.70   EOSINOPHILS 3.60 0.00 0.00   BASOPHILS 0.30 0.10 0.10     Recent Labs     05/06/20  0521 05/07/20  0243 05/08/20  0414   RBC 5.05 4.94 5.01   HEMOGLOBIN 13.2 13.3 13.2   HEMATOCRIT 43.1 43.4 42.5   PLATELETCT 129* 128* 144*   APTT 47.3*  --   --        Imaging  X-Ray:  I have personally reviewed the images and compared with prior images.     Echo 5/2  CONCLUSIONS  No prior study is available for comparison.   Normal left ventricular chamber size.  Left ventricular ejection fraction is visually estimated to be greater   than 75%.  Severely dilated right ventricle.  Reduced right ventricular systolic function.  Mild aortic stenosis.  Estimated right ventricular systolic pressure  is 70 mmHg    Assessment/Plan  * Acute respiratory failure with hypoxia and hypercapnia (HCC)  Assessment & Plan  Initially intubated 5/1  Right heart failure and sepsis/pneumonia  Suspected covid initially, PCR for COVID negative x3 now, isolation discontinued  CTA negative for PE, left lower lobe atelectasis/pneumonia suspected, diffuse viral process not seen  Transitioned heparin drip to Xarelto for atrial flutter and for pulmonary hypertension  Extubation 5/6, failed with stridor and reintubated early a.m. 5/7  Decadron 4 mg IV every 6x8 doses  Monitor cuff leak test daily  Probable SBT's again a.m. 5/9  Early bridge with AVAPS with next extubation?  Consider nebulized lidocaine and/or racemic epinephrine prophylactically with next extubation  RT protocols/ventilator bundle  SAT/SBT as clinically appropriate  Mobilize as hemodynamics and respiratory status allow    Pulmonary hypertension (HCC)  Assessment & Plan  RVSP 70  Significant dilation left atrium  BMI 40 query component of PREMA/OHS  Suspect chronic hypoxemia  CTA negative for PE  5/4  No clear valvular abnormalities beyond mild left ear on echo with preserved LV function  New onset atrial flutter 5/4, recurrent 5/6-7  Continue apixaban  Cardiology consulting  Resuming Cozaar, holding beta-blocker    Atrial flutter (HCC)  Assessment & Plan  Intermittently with RVR, rate controlled except for with exertion  New onset 5/4  Recurrent 5/6-7 with recurrent respiratory failure and reintubation, complicated by requirement of norepinephrine infusion  Echo with severe pulmonary pretension and mild AS  CTA with no evidence of pulmonary embolus but dilated left atrium  Continue to optimize electrolytes  Norepinephrine weaned off again  Continue apixaban and amiodarone enterally  Discussed use of beta-blockers for rate control if needed, alternatively if BP low consider digoxin  Monitor for the need to cardiovert again, no for now unless she becomes hemodynamically unstable    Septic shock (HCC)  Assessment & Plan  This is Septic shock Present on admission  SIRS criteria identified on my evaluation include: Tachycardia, with heart rate greater than 90 BPM, Tachypnea, with respirations greater than 20 per minute and Leukocytosis, with WBC greater than 12,000  Source is likely pneumonia, + diarrhea,  covid ruled out with 3 serial negative studies  Presentation includes: hypervolemic with heart failure, unable to give sepsis bolus  \the patient remains hypotensive with systolic blood pressure less than 90 or MAP less than 65  Hemodynamic support with additional fluids and IV vasopressors as needed to maintain a SBP of 90 or MAP of 65  IV antibiotics as appropriate for source of sepsis  Reassessment: I have reassessed the patient's hemodynamic status      Acute on chronic diastolic heart failure (HCC)  Assessment & Plan  Right heart enlarged with deviated septum on initial bedside us  Echo noted, severe pulmonary pretension  Titrating norepinephrine infusion intermittently, limiting IV fluids, will avoid  propofol for sedation due to to negative effects on BP  Resume losartan when hemodynamics okay-5/5, hold beta-blocker for now per cardiology  COVID PCR negative x3, isolation removed  Initially treated with IV heparin, noninvasive study and CTA chest both negative for thrombus  Forced diuresis as BP allows, reducing dose of Lasix, BUN climbing and to a lesser degree creatinine  Morbidly obese, query OHS/PREMA  Cardiology following, reviewing with them daily    Pneumonia due to infectious organism  Assessment & Plan  Purulent secretions on intubation  Possibly from aspiration or cap  Antibiotics- cefepime and doxycycline with last day of treatment 5/6  COVID PCR negative daily x3 days-CT scan does not reveal diffuse viral pneumonic process  Cultures negative so far, no sputum culture ever sent?  WBC increased after initiation Decadron, monitor for HCAP    Aortic stenosis  Assessment & Plan  Contributory to right heart failure?  Mild echocardiogram report  Cardiology consultation noted    Acute renal failure (ARF) (HCC)  Assessment & Plan  Likely from poor perfusion from sepsis/cardiac dysfunction  BUN slightly increased given and creatinine remains normal  Avoid nephrotoxins as able clinically  Renally dose medications were clinically appropriate  Monitor urine output and serial BMP  Reduce dose of Lasix and adjust potassium supplementation    Diarrhea  Assessment & Plan  Unclear etiology, doubt COVID, improved  Does not seem to have been treated with ABX prior  No bowel movement for the first 4 days, episode of diarrhea 5/5 and stool for C. difficile sent by nursing staff, it was ordered on admission, results negative  Continue to monitor    Essential hypertension  Assessment & Plan  Keep SBp < 160  Resume home regimen when clinically appropriate including Benicar, Cozaar and Toprol-XL   Resume Cozaar first, start with split dose of half what she was taking at home, reviewed with cardiology  Continue diuresis and if  hemodynamically susceptible after resuming Cozaar consider adding back low-dose beta-blocker, was on 50 mg HL once a day, consider 12.5 mg every 12H if hemodynamics are acceptable    Hypokalemia  Assessment & Plan  Replete, goal greater than 4.0, ERP  Also replete magnesium to greater than 2.0    Hypophosphatemia  Assessment & Plan  Replete, goal greater than 2.5, ERP    Suspected COVID-19 virus infection  Assessment & Plan  covid PCR x3 all negative, intermittently performed daily 5/1, 5/2 and 5/3  Diarrhea may be a sign of covid, improved  Respiratory failure, persist  CTA most consistent with left lower lobe atelectasis and small effusion, query pneumonia there, diffuse viral pneumonia or PE not seen  Okay discontinue isolation    Acute encephalopathy  Assessment & Plan  Sedate once again on the ventilator but now following as we have weaned off propofol and substituted fentanyl  multifactorial etiology  We will keep off propofol and try to limit fentanyl and/or dexmedetomidine in hopes to get her off the ventilator tomorrow or the next day     VTE:  Heparin  Ulcer: H2 Antagonist  Lines: Central Line  Ongoing indication addressed    I have performed a physical exam and reviewed and updated ROS and Plan today (5/8/2020). In review of yesterday's note (5/7/2020), there are no changes except as documented above.     Discussed patient condition and risk of morbidity and/or mortality with RN, RT, Pharmacy, Charge nurse / hot rounds and Patient     The patient remains critically ill.  Patient is on Decadron and back on a ventilator for stridor and failed extubation a day ago.  Complete Decadron therapy through tomorrow and begin SBT then.  Back in atrial flutter but hemodynamically acceptable, continuing amiodarone, anticoagulation and optimizing electrolytes.  Consider cardioversion again. Blood pressure improved, weaned off norepinephrine for now.  Sedated with fentanyl and will attempt to wean off that as able.   Patient's prognosis remains guarded.  With extubation consider bridging with AVAPS and prophylactic racemic epinephrine or lidocaine.  Patient remains at high risk for increased morbidity and mortality without above critical care interventions.    Critical care time = 45 minutes in directly providing and coordinating critical care and extensive data review.  No time overlap and excludes procedures.

## 2020-05-08 NOTE — CARE PLAN
Problem: Venous Thromboembolism (VTW)/Deep Vein Thrombosis (DVT) Prevention:  Goal: Patient will participate in Venous Thrombosis (VTE)/Deep Vein Thrombosis (DVT)Prevention Measures  Flowsheets  Taken 5/8/2020 0000  Mechanical Prophylaxis: SCDs, Sequential Compression Device  SCDs, Sequential Compression Device: On  Taken 5/7/2020 2000  Pharmacologic Prophylaxis Used: Apixaban     Problem: Skin Integrity  Goal: Risk for impaired skin integrity will decrease  Note: Q2 turns. Waffle overlay in place. Innerdry under pannus. Barrier cream to buttocks. Extremities floated.

## 2020-05-08 NOTE — DISCHARGE PLANNING
Care Transition Team Assessment  NOK search revealed no results.  [facility] has 2 friends listed as emergency contacts. Information below acquired via chart review as pt is intubated.    Information Source  Orientation : Unable to Assess(pt follows commands and writes appropriately)  Information Given By: (chart review)  Who is responsible for making decisions for patient? : Patient    Readmission Evaluation  Is this a readmission?: No    Elopement Risk  Legal Hold: No  Ambulatory or Self Mobile in Wheelchair: No-Not an Elopement Risk  Elopement Risk: Not at Risk for Elopement    Interdisciplinary Discharge Planning  Primary Care Physician: Ken Piña DO, per [facility]  Support Systems: (friends)  Housing / Facility: [address] [facility](address on [facility], [address] [place] [facility]  Prior Services: Unable To Determine At This Time    Discharge Preparedness  What is your plan after discharge?: Uncertain - pending medical team collaboration         Finances  Prescription Coverage: Yes([facility])    Vision / Hearing Impairment  Vision Impairment : Yes  Right Eye Vision: Impaired, Wears Glasses  Left Eye Vision: Impaired, Wears Glasses              Domestic Abuse  Have you ever been the victim of abuse or violence?: No

## 2020-05-08 NOTE — THERAPY
"Occupational Therapy Evaluation completed.   Functional Status:  Max A supine <> sit, Min A seated grooming, Max A LB dressing  Plan of Care: Will benefit from Occupational Therapy 3 times per week  Discharge Recommendations:  Equipment: Will Continue to Assess for Equipment Needs. Post-acute therapy Today: Recommend post-acute placement for additional occupational therapy services prior to discharge home. However, pt shows good potential to progress quickly while in house.     See \"Rehab Therapy-Acute\" Patient Summary Report for complete documentation.      Pt is a 81 y/o female who presents to acute due to SOB and renal failure. PMH includes HTN, CHF, aortic stenosis. Pt now intubated. Pt followed commands appropriately and agreeable to sit at EOB. Once pt was at EOB pt became tremulous throughout whole body. RN present during session, pt returned to supine. Will continue to follow for Acute OT services to increase balance, functional mobility, activity tolerance and overall strength. Recommend post acute placement at this time.   "

## 2020-05-08 NOTE — FLOWSHEET NOTE
05/08/20 0413 05/08/20 0531   Weaning Trial   Weaning Trial Initiated Yes  --    Length of Weaning Trial (Hours)  --  1.5   Weaning Parameters   RR (bpm)  --  21   $ FVC / Vital Capacity (liters)   --  1021   NIF (cm H2O)   --    (pt uable to perform at this time)   Rapid Shallow Breathing Index (RR/VT)  --  58   Spontaneous VE  --  7   Spontaneous VT  --  272

## 2020-05-08 NOTE — PROGRESS NOTES
Cardiology Follow Up Progress Note    Date of Service  5/8/2020    Attending Physician  Vish Carrington M.D.    Chief Complaint   Evaluation of atrial fibrillation and atrial flutter    HPI  Airam Leavitt is a 80 y.o. female seen for evaluation of the above complaints.  She is still intubated and on fentanyl infusion.  Despite this she is alert this morning and can nod her head to questions.  She was in atrial fibrillation yesterday with controlled rate.  Early this morning her rate was fast and she was given metoprolol.  Currently her rate is now back under control.  She is anticoagulated.    Review of Systems  Review of Systems   Unable to perform ROS: Intubated       Vital signs in last 24 hours  Temp:  [36.7 °C (98.1 °F)-37.4 °C (99.3 °F)] 37.4 °C (99.3 °F)  Pulse:  [] 80  Resp:  [14-30] 20  BP: ()/(46-86) 111/59  SpO2:  [94 %-99 %] 94 %    Physical Exam  Physical Exam  Constitutional:       Interventions: She is sedated and intubated.   Cardiovascular:      Rate and Rhythm: Normal rate. Rhythm irregular.   Pulmonary:      Effort: Pulmonary effort is normal. No respiratory distress. She is intubated.      Breath sounds: No wheezing.   Skin:     General: Skin is warm and dry.   Neurological:      General: No focal deficit present.         Lab Review  Lab Results   Component Value Date/Time    WBC 20.4 (H) 05/08/2020 04:14 AM    RBC 5.01 05/08/2020 04:14 AM    HEMOGLOBIN 13.2 05/08/2020 04:14 AM    HEMATOCRIT 42.5 05/08/2020 04:14 AM    MCV 84.8 05/08/2020 04:14 AM    MCH 26.3 (L) 05/08/2020 04:14 AM    MCHC 31.1 (L) 05/08/2020 04:14 AM    MPV 11.1 05/08/2020 04:14 AM      Lab Results   Component Value Date/Time    SODIUM 139 05/08/2020 04:14 AM    POTASSIUM 3.8 05/08/2020 04:14 AM    CHLORIDE 94 (L) 05/08/2020 04:14 AM    CO2 29 05/08/2020 04:14 AM    GLUCOSE 174 (H) 05/08/2020 04:14 AM    BUN 53 (H) 05/08/2020 04:14 AM    CREATININE 1.18 05/08/2020 04:14 AM      Lab Results   Component Value  Date/Time    ASTSGOT 18 05/04/2020 05:04 AM    ALTSGPT 13 05/04/2020 05:04 AM     Lab Results   Component Value Date/Time    CHOLSTRLTOT 139 06/27/2013 09:25 AM    LDL 80 06/27/2013 09:25 AM    HDL 39 (A) 06/27/2013 09:25 AM    TRIGLYCERIDE 99 05/01/2020 11:00 PM    TROPONINT 68 (H) 05/01/2020 11:00 PM       No results for input(s): NTPROBNP in the last 72 hours.      Atrial fibrillation: The patient is been in atrial fibrillation for over 24 hours.  She had a brief episode of rapid response rate that was treated with metoprolol.  As noted yesterday, with her severe pulmonary hypertension, it is unlikely that she would maintain sinus rhythm.  Continue amiodarone for rate control for the time being.    Anticoagulation: Patient is on apixaban.    Poorly hypertension: Etiology is unknown at this time.  Recommend repeating echo for evaluation of pulmonary pressure once she has improved clinically and and is off the respirator.  Cardiology will follow.    Nitish Dudley M.D.   Cardiologist, [facility]  [phone]

## 2020-05-09 NOTE — CARE PLAN
Problem: Safety  Goal: Will remain free from injury  Note: Bed locked and in low position. Non-skid socks. Pt calls appropriately. Frequent rounding. Soft wrist restraints in place.     Problem: Mobility  Goal: Risk for activity intolerance will decrease  Note: Pt able to do edge of bed/stand with 1-2 people. Pt mobilizes at least once per shift, self-motivated.

## 2020-05-09 NOTE — PROCEDURES
Procedures    Elective cardioversion    Preoperative diagnosis: Atrial fibrillation.    Postoperative diagnosis: Sinus rhythm      The patient was intubated and on a ventilator at the time of the procedure.  She was given 50 mcg of fentanyl for conscious sedation along with incremental doses of Versed.  After 3 mg of Versed, appropriate conscious sedation was obtained.  She was cardioverted on in synchronous mode at 120 J without success.  She was re-cardioverted again and synchronous mode at 150 J with resultant sinus rhythm.  This was verified by EKG.  There were no complications.

## 2020-05-09 NOTE — RESPIRATORY CARE
COPD EDUCATION by COPD CLINICAL EDUCATOR  5/9/2020 at 7:47 AM by Krissy Koo, RRT     Patient reviewed by COPD education team. Patient does not have a history or diagnosis of COPD and is a non-smoker, therefore does not qualify for the COPD program.

## 2020-05-09 NOTE — CARE PLAN
Problem: Bronchoconstriction:  Goal: Improve in air movement and diminished wheezing  Outcome: PROGRESSING AS EXPECTED  Note:     Respiratory Update    Treatment modality: SVN  Frequency:Q4    Pt tolerating current treatments well with no adverse reactions.       Problem: Ventilation Defect:  Goal: Ability to achieve and maintain unassisted ventilation or tolerate decreased levels of ventilator support  Outcome: PROGRESSING AS EXPECTED  Note:    Ventilator Daily Summary    Vent Day #9    Ventilator settings changed this shift:No    Weaning trials:Yes    Respiratory Procedures:No    Plan: Continue current ventilator settings and wean mechanical ventilation as tolerated per physician orders.

## 2020-05-09 NOTE — FLOWSHEET NOTE
05/09/20 0501   Events/Summary/Plan   Events/Summary/Plan Called to bedside; pt extremely agitated and trying to pull at tube; mouthing to RT to pull tube; parameters pulled; parameters met but no cuff leak; per MD no extubation at this time; pt placed back on support   General Vent Information   Vent Mode APVCMV   Vent Settings   FiO2% 30 %   Rate (breaths/min) 20   Vt Target (mL) 310   PEEP/CPAP 8   Trigger Type Flow Trigger   Sensitivity Setting 5   Weaning Trial   Weaning Trial Stopped due to: Pt weaned for 1 hour and returned to rest settings per protocol   Length of Weaning Trial (Hours) 1   Weaning Parameters   RR (bpm) 27   $ FVC / Vital Capacity (liters)  1.1   NIF (cm H2O)  -39   Rapid Shallow Breathing Index (RR/VT) 76   Spontaneous VE 7.7   Spontaneous

## 2020-05-09 NOTE — PROGRESS NOTES
"0445 - Pt had been in SBT since 0400. Pt had been calm, appropriate. Pt had been awaiting potential  extubation this morning. Notified pt that after SBT, she would return to vent settings and await day shift doctor. Pt began thrashing, mouthing \"Now!\" and reaching for tube. Multiple CCTs and RNs in room to assist, restraints tightened. Pt tachy 140s-150s. RT on phone with Dr. Singh. Per MD, hold off on extubation, increase sedation. 100mcg fentanyl push administered, fentanyl gtt restarted. Per MD, additional 100mcg fentanyl push given and precedex gtt started.  "

## 2020-05-09 NOTE — PROGRESS NOTES
"Critical Care Progress Note    Date of admission  5/1/2020    Chief Complaint  80 y.o. female admitted 5/1/2020 with increasing dyspnea, CHF versus pneumonia, severe pulmonary hypertension, intubated 5/1    Hospital Course    \"80 y.o. female who presented 5/1/2020 with respiratory failure with hypoxia.  She has a hx of hypertension, chf, aortic stenosis and was initially admitted to medical floor.  Per admission note 5 days of sob.  Worse when she lays down.  Diarrhea for 3 days.  No abdomianl pain.  Recently started on lasix outpatient due to lower ext edema and weight gain.  Transferred from [facility].  Labs were significant for renal failure with cr 2.6, troponin 0.08, wbc 12.  She has possible pneumonia on chest xray.  Covid suspected and negative test on admission.  Respiratory failure and transferred to ICU.  She had hypercapnic respiratory failure with hypoxia.  Lethargic. Intubated.  There were purulent secretions on intubation upper airway.  Bedside us showed enlarged right heart, I started on heparin drip. Avoid contrast exam for now given suspected covid of which may be reason for diarrhea.  Weaning o2 on ventilator after needing high peep. Levophed for shock.  Treating for pneumonia.  Given 500 ml IVF after intubation.  She is becoming hypervolemic at this time.  Added vasopressin.  Ongoing agitation despite propofol.  Required arterial line and central line.  No pericardial effusion, adequate LVEF at least moderate to normal function.  Started antibiotics.  Blood cx pending.  Moves all extremities and no focal deficits.\" -Dr Latif 5/1         Interval Problem Update  Reviewed last 24 hour events:    Case reviewed at bedside with cardiology, both agree we should do elective cardioversion before extubation and get her back in sinus rhythm which hopefully help her stay off the ventilator once extubated    Awake and follows  Anxious  AF 70-110s  SBp 120-160s  UO good  I/Os + 309cc  TF goal " Cortrak  Tm 100.0  WBC 19.1  FENT 200  EOB [birth year]  VentDay#8  PEEP 8, 30%  CXR slt better opacities  Spont last stopped earlty  NIF 39, VC 1.1, RSBP 79  No cuff leak  Amiodarone  Eliquis    4 more dose Decadron, continue to check for adequate cuff leak  The MRI she showed me the video above and said that was going to guide her, that is when I started to worry  ABG weaning, reassess for hypercarbia  PT/OT pending  Elective cardioversion    Patient anxious today and have follow-up failed cuff leak, not ready for liberation trial  Remains in sinus rhythm after cardioversion, patient transiently dropped her blood pressure.  Cardioversion requiring Sanket-Synephrine bolus, continue Eliquis and amiodarone for now     YESTERDAY  Case reviewed with cardiology, hold off on repeat cardioversion till she is proved clinically and perhaps off the ventilator, continuing anticoagulation and amiodarone with gentle forced diuresis    Awake and follows  Fentanyl 150  Aflutter 80-130s  SBp 120s  IV Metoprolol x one  Off NE drip  UO good  I/Os + 2.6  Lasix  TF goal  Tm 99.5  WBC 20 - steroids  VentDay#7  PEEP 8, 30%  CXR looks better  PLt 144, improved  Renal function slt worse  Lasix BiD  Enteral Amiodarone  Decadron    Cuff baptiste eval daily  SBT in AM  Continue forced diuresis but reduce K/lasix doses  PT/OT eval and treat  Mg/Phos/K repletion  Monitor for need to repeat cardioversion  Continue amiodarone    Serial follow-up  Heart rate control but still in flutter  Blood pressure bit labile, down to 90 at times  Mentation remains good, attempting to wean off fentanyl    Review of Systems  Review of Systems   Unable to perform ROS: Acuity of condition   Remains intubated    Vital Signs for last 24 hours   Temp:  [37 °C (98.6 °F)-37.5 °C (99.5 °F)] 37.4 °C (99.3 °F)  Pulse:  [] 106  Resp:  [18-25] 21  BP: ()/(59-92) 98/59  SpO2:  [92 %-99 %] 98 %    Hemodynamic parameters for last 24 hours       Respiratory Information for the  last 24 hours  Vent Mode: APVCMV  Rate (breaths/min): 20  Vt Target (mL): 310  PEEP/CPAP: 8  P Support: 5  MAP: 11  Length of Weaning Trial (Hours): 1  Control VTE (exp VT): 337    Physical Exam   Physical Exam  Vitals signs and nursing note reviewed.   Constitutional:       Appearance: She is morbidly obese. She is ill-appearing (Improved). She is not toxic-appearing or diaphoretic.      Interventions: She is sedated, intubated and restrained.      Comments: Following, somnolent at times with dexmedetomidine   HENT:      Head: Normocephalic and atraumatic.      Right Ear: External ear normal.      Left Ear: External ear normal.      Nose: No congestion or rhinorrhea.      Mouth/Throat:      Mouth: Mucous membranes are dry.      Pharynx: No oropharyngeal exudate or posterior oropharyngeal erythema.   Eyes:      General: No scleral icterus.     Extraocular Movements: Extraocular movements intact.      Pupils: Pupils are equal, round, and reactive to light.   Neck:      Musculoskeletal: Neck supple. No neck rigidity or muscular tenderness.      Vascular: No JVD.   Cardiovascular:      Rate and Rhythm: Tachycardia present. Rhythm irregular. Occasional extrasystoles are present.     Pulses: Normal pulses.      Heart sounds: Normal heart sounds. No murmur. No friction rub. No gallop.       Comments: A flutter, after cardioversion sinus rhythm  Pulmonary:      Effort: No respiratory distress. She is intubated.      Breath sounds: Decreased breath sounds (Bases) and rhonchi (bilateral) present. No wheezing or rales.   Abdominal:      General: Abdomen is protuberant. There is no distension.      Palpations: Abdomen is soft. There is no mass.      Tenderness: There is no abdominal tenderness. There is no right CVA tenderness, left CVA tenderness or guarding. Negative signs include Charles's sign.   Musculoskeletal:         General: No swelling or tenderness.      Right lower leg: Edema (Improved) present.      Left lower leg:  Edema (Improved) present.   Lymphadenopathy:      Cervical: No cervical adenopathy.   Skin:     Capillary Refill: Capillary refill takes less than 2 seconds.      Coloration: Skin is not cyanotic, jaundiced or pale.      Findings: No bruising, erythema, lesion or rash.      Nails: There is no clubbing.     Neurological:      General: No focal deficit present.      Mental Status: She is easily aroused. She is lethargic.      GCS: GCS eye subscore is 4. GCS verbal subscore is 1. GCS motor subscore is 6.      Cranial Nerves: No cranial nerve deficit.      Sensory: No sensory deficit.      Motor: No weakness.      Deep Tendon Reflexes: Reflexes normal.      Comments: Awake and follows commands and moves all 4, withdraws all extremities symmetrically, brainstem reflexes intact   Psychiatric:         Mood and Affect: Mood is anxious.         Behavior: Behavior is not agitated. Behavior is cooperative.      Comments: Unable to assess completely       Medications  Current Facility-Administered Medications   Medication Dose Route Frequency Provider Last Rate Last Dose   • midazolam (VERSED) injection 2 mg  2 mg Intravenous Once Nitish Dudley M.D.       • dexamethasone (DECADRON) injection 4 mg  4 mg Intravenous Q6HRS Mahad Sherman M.D.   4 mg at 05/09/20 1647   • microfibrillar collagen (AVITENE) sheet 1 Each  1 Each Topical Once Nitish Lal Jr., D.O.       • Metoprolol Tartrate (LOPRESSOR) injection 5 mg  5 mg Intravenous Q5 MIN PRN Oziel Singh M.D.   5 mg at 05/09/20 0527   • famotidine (PEPCID) tablet 20 mg  20 mg Enteral Tube DAILY Mahad Sherman M.D.   20 mg at 05/09/20 0522   • potassium bicarbonate (KLYTE) effervescent tablet 25 mEq  25 mEq Enteral Tube DAILY Mahad Sherman M.D.   25 mEq at 05/09/20 0522   • furosemide (LASIX) injection 20 mg  20 mg Intravenous Q DAY Mahad Sherman M.D.   20 mg at 05/09/20 0522   • ipratropium-albuterol (DUONEB) nebulizer solution  3 mL Nebulization Q4HRS  (RT) LEVON Champagne Jr.OGina   Stopped at 05/09/20 0700   • senna-docusate (PERICOLACE or SENOKOT S) 8.6-50 MG per tablet 2 Tab  2 Tab Enteral Tube BID TIGRE Champagne Jr..O.   2 Tab at 05/09/20 1646    And   • polyethylene glycol/lytes (MIRALAX) PACKET 1 Packet  1 Packet Enteral Tube QDAY PRN TIGRE Champagne Jr..OGina   1 Packet at 05/09/20 0522    And   • magnesium hydroxide (MILK OF MAGNESIA) suspension 30 mL  30 mL Enteral Tube QDAY PRN Nitish Lal Jr., D.O.        And   • bisacodyl (DULCOLAX) suppository 10 mg  10 mg Rectal QDAY PRN Nitish Lal Jr., D.O.       • MD Alert...ICU Electrolyte Replacement per Pharmacy   Other PHARMACY TO DOSE TIGRE Champagne Jr..GABRIELLA.       • lidocaine (XYLOCAINE) 1 % injection 1-2 mL  1-2 mL Tracheal Tube Q30 MIN PRN LEVON Champagne Jr.OGina   1 mL at 05/07/20 2328   • norepinephrine (Levophed) infusion 8 mg/250 mL (premix)  0-30 mcg/min Intravenous Continuous LEVON Champagne Jr.OGina   Stopped at 05/07/20 1500   • oxyCODONE immediate-release (ROXICODONE) tablet 5 mg  5 mg Enteral Tube Q4HRS PRN Mahad Sherman M.D.        Or   • oxyCODONE immediate release (ROXICODONE) tablet 10 mg  10 mg Enteral Tube Q4HRS PRN Mahad Sherman M.D.   10 mg at 05/09/20 1007   • losartan (COZAAR) tablet 25 mg  25 mg Enteral Tube BID Mahad Sherman M.D.   25 mg at 05/09/20 1646   • fentaNYL (SUBLIMAZE) 50 mcg/mL in 50mL (Continuous Infusion)   Intravenous Continuous Mahad Sherman M.D. 4 mL/hr at 05/09/20 0709 200 mcg at 05/09/20 1253   • Dexmedetomidine HCl-Dextrose 200MCG/50ML -5% SOLN  0.1-1.5 mcg/kg/hr Intravenous Continuous Mahad Sherman M.D.   Stopped at 05/09/20 0550   • apixaban (ELIQUIS) tablet 5 mg  5 mg Enteral Tube BID Nitish Dudley M.D.   5 mg at 05/09/20 1646   • amiodarone (CORDARONE) tablet 200 mg  200 mg Enteral Tube TWICE DAILY Nitish Dudley M.D.   200 mg at 05/09/20 1646   • Pharmacy Consult: Enteral tube insertion - review meds/change  route/product selection  1 Each Other PHARMACY TO DOSE Vish Carrington M.D.       • acetaminophen (TYLENOL) tablet 650 mg  650 mg Enteral Tube Q6HRS PRN Vish Carrington M.D.       • allopurinol (ZYLOPRIM) tablet 100 mg  100 mg Enteral Tube BID Vish Carrington M.D.   100 mg at 05/09/20 1647   • ascorbic acid tablet 500 mg  500 mg Enteral Tube DAILY Vish Carrington M.D.   500 mg at 05/09/20 0522   • cyclobenzaprine (FLEXERIL) tablet 10 mg  10 mg Enteral Tube TID PRN Vish Carrington M.D.       • Respiratory Therapy Consult   Nebulization Continuous RT Fernandez Latif M.D.       • ipratropium-albuterol (DUONEB) nebulizer solution  3 mL Nebulization Q2HRS PRN (RT) Fernandez Latif M.D.       • fentaNYL (SUBLIMAZE) injection 25 mcg  25 mcg Intravenous Q HOUR PRN Fernandez Latif M.D.   25 mcg at 05/07/20 0237    Or   • fentaNYL (SUBLIMAZE) injection 50 mcg  50 mcg Intravenous Q HOUR PRN Fernandez Latif M.D.   50 mcg at 05/09/20 0852    Or   • fentaNYL (SUBLIMAZE) injection 100 mcg  100 mcg Intravenous Q HOUR PRN Frenandez Latif M.D.   100 mcg at 05/09/20 1601       Fluids    Intake/Output Summary (Last 24 hours) at 5/9/2020 1908  Last data filed at 5/9/2020 0800  Gross per 24 hour   Intake 667.29 ml   Output 760 ml   Net -92.71 ml       Laboratory  Recent Labs     05/06/20  2346 05/07/20  0337 05/08/20  0301   ISTATAPH 7.327* 7.569* 7.552*   ISTATAPCO2 71.0* 40.1* 34.9   ISTATAPO2 140* 284* 98*   ISTATATCO2 39* 38* 32   BXOWFBP1PCS 99 100* 98   ISTATARTHCO3 37.2* 36.6* 30.7*   ISTATARTBE 8* 13* 8*   ISTATTEMP 98.0 F 37.5 C 37.4 C   ISTATFIO2 100 100 40   ISTATSPEC Arterial Arterial Arterial   ISTATAPHTC 7.332* 7.561* 7.546*   PNGIKBLJ4SG 138* 286* 101*         Recent Labs     05/07/20  0243 05/08/20  0414 05/09/20  0351   SODIUM 137 139 136   POTASSIUM 4.2 3.8 4.1   CHLORIDE 94* 94* 93*   CO2 30 29 30   BUN 36* 53* 64*   CREATININE 0.99 1.18 0.99   MAGNESIUM  --  2.0 2.5   PHOSPHORUS  --  2.4* 3.6   CALCIUM 9.3 9.8 9.7      Recent Labs     05/07/20  0243 05/08/20  0414 05/09/20  0351   ALTSGPT  --   --  35   ASTSGOT  --   --  39   ALKPHOSPHAT  --   --  73   TBILIRUBIN  --   --  0.4   GLUCOSE 199* 174* 178*     Recent Labs     05/07/20  0243 05/08/20  0414 05/09/20  0351   WBC 16.6* 20.4* 19.1*   NEUTSPOLYS 92.60* 90.70* 86.10*   LYMPHOCYTES 3.10* 3.40* 5.50*   MONOCYTES 3.60 4.70 6.30   EOSINOPHILS 0.00 0.00 0.00   BASOPHILS 0.10 0.10 0.10   ASTSGOT  --   --  39   ALTSGPT  --   --  35   ALKPHOSPHAT  --   --  73   TBILIRUBIN  --   --  0.4     Recent Labs     05/07/20 0243 05/08/20 0414 05/09/20  0351   RBC 4.94 5.01 5.09   HEMOGLOBIN 13.3 13.2 13.4   HEMATOCRIT 43.4 42.5 43.6   PLATELETCT 128* 144* 182       Imaging  X-Ray:  I have personally reviewed the images and compared with prior images.     Echo 5/2  CONCLUSIONS  No prior study is available for comparison.   Normal left ventricular chamber size.  Left ventricular ejection fraction is visually estimated to be greater   than 75%.  Severely dilated right ventricle.  Reduced right ventricular systolic function.  Mild aortic stenosis.  Estimated right ventricular systolic pressure  is 70 mmHg    Assessment/Plan  * Acute respiratory failure with hypoxia and hypercapnia (HCC)  Assessment & Plan  Initially intubated 5/1  Right heart failure and sepsis/pneumonia  Suspected covid initially, PCR for COVID negative x3 now, isolation discontinued  CTA negative for PE, left lower lobe atelectasis/pneumonia suspected, diffuse viral process not seen  Transitioned heparin drip to Xarelto for atrial flutter and for pulmonary hypertension  Extubation 5/6, failed with stridor and reintubated early a.m. 5/7  Decadron 4 mg IV every 6 hours, continue x24 more hours  Monitor cuff leak test daily, no leak yet  ABG with weaning parameters when clinically ready  Early bridge with AVAPS with next extubation?  Consider nebulized lidocaine and/or racemic epinephrine prophylactically with next  extubation  Monitor for need to extubate with bougie or bronchoscope  RT protocols/ventilator bundle  SAT/SBT as clinically appropriate  Mobilize as hemodynamics and respiratory status allow    Pulmonary hypertension (HCC)  Assessment & Plan  RVSP 70  Significant dilation left atrium  BMI 40 query component of PREMA/OHS  Suspect chronic hypoxemia  CTA negative for PE 5/4  No clear valvular abnormalities beyond mild left ear on echo with preserved LV function  New onset atrial flutter 5/4, recurrent 5/6-7  Continue apixaban  Cardiology consulting  Resuming Cozaar, holding beta-blocker    Atrial flutter (HCC)  Assessment & Plan  intermittently with RVR, rate controlled except for with exertion  New onset 5/4  Recurrent 5/6-7 with recurrent respiratory failure and reintubation, complicated by requirement of norepinephrine infusion  Echo with severe pulmonary pretension and mild AS  CTA with no evidence of pulmonary embolus but dilated left atrium  Continue to optimize electrolytes  Continue apixaban and amiodarone enterally  Repeat elective cardioversion successful 5/9    Septic shock (HCC)  Assessment & Plan  This is Septic shock Present on admission  SIRS criteria identified on my evaluation include: Tachycardia, with heart rate greater than 90 BPM, Tachypnea, with respirations greater than 20 per minute and Leukocytosis, with WBC greater than 12,000  Source is likely pneumonia, + diarrhea,  covid ruled out with 3 serial negative studies  Presentation includes: hypervolemic with heart failure, unable to give sepsis bolus  \the patient remains hypotensive with systolic blood pressure less than 90 or MAP less than 65  Hemodynamic support with additional fluids and IV vasopressors as needed to maintain a SBP of 90 or MAP of 65  IV antibiotics as appropriate for source of sepsis  Reassessment: I have reassessed the patient's hemodynamic status      Acute on chronic diastolic heart failure (HCC)  Assessment & Plan  Right  heart enlarged with deviated septum on initial bedside us  Echo noted, severe pulmonary pretension  Titrating norepinephrine infusion intermittently, limiting IV fluids, will avoid propofol for sedation due to to negative effects on BP  Resume losartan when hemodynamics okay-5/5, hold beta-blocker for now per cardiology  COVID PCR negative x3, isolation removed  Initially treated with IV heparin, noninvasive study and CTA chest both negative for thrombus  Forced diuresis as BP allows, reducing dose of Lasix, BUN climbing and to a lesser degree creatinine  Morbidly obese, query OHS/PREMA  Cardiology following, reviewing with them daily  When extubated bridge with AVAPS for respiratory failure/heart failure-query PREMA    Pneumonia due to infectious organism  Assessment & Plan  Purulent secretions on intubation  Possibly from aspiration or cap  Antibiotics- cefepime and doxycycline with last day of treatment 5/6  COVID PCR negative daily x3 days-CT scan does not reveal diffuse viral pneumonic process  Cultures negative so far, no sputum culture ever sent?  WBC increased after initiation Decadron, monitor for HCAP    Aortic stenosis  Assessment & Plan  Contributory to right heart failure?  Mild echocardiogram report  Cardiology consultation noted    Acute renal failure (ARF) (HCC)  Assessment & Plan  Likely from poor perfusion from sepsis/cardiac dysfunction  BUN increasing and creatinine remains normal  Avoid nephrotoxins as able clinically  Renally dose medications were clinically appropriate  Monitor urine output and serial BMP  Lasix dose reduced 5/8, may need to discontinue for now?  Monitoring renal function  Optimize electrolytes    Essential hypertension  Assessment & Plan  Keep SBp < 160  Resume home regimen when clinically appropriate including Benicar, Cozaar and Toprol-XL   Resume Cozaar first, start with split dose of half what she was taking at home, reviewed with cardiology  Continue diuresis and if  hemodynamically susceptible after resuming Cozaar consider adding back low-dose beta-blocker, was on 50 mg HL once a day, consider 12.5 mg every 12H if hemodynamics are acceptable    Hypokalemia  Assessment & Plan  Replete, goal greater than 4.0, ERP  Also replete magnesium to greater than 2.0    Hypophosphatemia  Assessment & Plan  Replete, goal greater than 2.5, ERP    Suspected COVID-19 virus infection  Assessment & Plan  covid PCR x3 all negative, intermittently performed daily 5/1, 5/2 and 5/3  Diarrhea may be a sign of covid, improved  Respiratory failure, persist  CTA most consistent with left lower lobe atelectasis and small effusion, query pneumonia there, diffuse viral pneumonia or PE not seen  Okay discontinue isolation    Acute encephalopathy  Assessment & Plan  Sedate once again on the ventilator but now following as we have weaned off propofol and substituted fentanyl and/or dexmedetomidine  multifactorial etiology  We will keep off propofol and try to limit fentanyl and/or dexmedetomidine in hopes to get her off the ventilator clinically appropriate    Diarrhea  Assessment & Plan  Unclear etiology, doubt COVID, improved  Does not seem to have been treated with ABX prior  No bowel movement for the first 4 days, episode of diarrhea 5/5 and stool for C. difficile sent by nursing staff, it was ordered on admission, results negative  Continue to monitor     VTE:  Heparin  Ulcer: H2 Antagonist  Lines: Central Line  Ongoing indication addressed    I have performed a physical exam and reviewed and updated ROS and Plan today (5/9/2020). In review of yesterday's note (5/8/2020), there are no changes except as documented above.     Discussed patient condition and risk of morbidity and/or mortality with RN, RT, Pharmacy, Charge nurse / hot rounds, Patient and cardiology     Patient remains critically ill.  Patient remains on full vent support and is not ready for liberation trial.  No cuff leak yet and is unsafe  for extubation due to and some upper airway issues.  Continue Decadron.  Repeat elective cardioversion successful, hopefully optimizing electrolytes and continuing amiodarone will help maintain sinus rhythm.  Anticoagulation continues.  May need extubation with the bronchoscope or a bougie.  Consider bridging with AVAPS early.  Consider racemic epinephrine/lidocaine nebulizer immediately after extubation.  Prognosis remains guarded.  Patient remains at high risk for increased morbidity and mortality without above critical care interventions.    Critical care time = 45 minutes in directly providing and coordinating critical care and extensive data review.  No time overlap and excludes procedures.

## 2020-05-09 NOTE — PROGRESS NOTES
Dr. Dudley at bedside for cardioversion.   1020 120J shock  1022 150J shock  Pt converted to sinus rhythm.  12 lead EKG obtained     Pt went hypotensive with MAP 45 post procedure. Sanket given and pt is currently stable.

## 2020-05-09 NOTE — PROGRESS NOTES
Cardiology Follow Up Progress Note    Date of Service  5/9/2020    Attending Physician  Vish Carrington M.D.    Chief Complaint   Evaluation of atrial fibrillation and flutter    HPI  Airam Leavitt is a 80 y.o. female admitted 5/1/2020 with progressive dyspnea and acute kidney failure.  She has been seen for evaluation of atrial fibrillation and flutter.  The patient was cardioverted successfully on the fifth because of atrial fib with rapid response.  She remained in fibrillation/flutter for the last 48 hours.  Her pulmonary status is improving and there was plan to wean off the ventilator.        Review of Systems  Review of Systems   Unable to perform ROS: Intubated       Vital signs in last 24 hours  Temp:  [37 °C (98.6 °F)-37.5 °C (99.5 °F)] 37.4 °C (99.3 °F)  Pulse:  [] 74  Resp:  [18-25] 20  BP: ()/(49-92) 98/59  SpO2:  [91 %-99 %] 95 %    Physical Exam  Physical Exam  Constitutional:       Interventions: She is sedated and intubated.      Comments: The patient is examined today and her physical examination is unchanged from the evaluation of May 8   Cardiovascular:      Rate and Rhythm: Normal rate. Rhythm irregular.   Pulmonary:      Effort: Pulmonary effort is normal. No respiratory distress. She is intubated.      Breath sounds: No wheezing.   Skin:     General: Skin is warm and dry.   Neurological:      General: No focal deficit present.         Lab Review  Lab Results   Component Value Date/Time    WBC 19.1 (H) 05/09/2020 03:51 AM    RBC 5.09 05/09/2020 03:51 AM    HEMOGLOBIN 13.4 05/09/2020 03:51 AM    HEMATOCRIT 43.6 05/09/2020 03:51 AM    MCV 85.7 05/09/2020 03:51 AM    MCH 26.3 (L) 05/09/2020 03:51 AM    MCHC 30.7 (L) 05/09/2020 03:51 AM    MPV 11.7 05/09/2020 03:51 AM      Lab Results   Component Value Date/Time    SODIUM 136 05/09/2020 03:51 AM    POTASSIUM 4.1 05/09/2020 03:51 AM    CHLORIDE 93 (L) 05/09/2020 03:51 AM    CO2 30 05/09/2020 03:51 AM    GLUCOSE 178 (H) 05/09/2020  03:51 AM    BUN 64 (H) 05/09/2020 03:51 AM    CREATININE 0.99 05/09/2020 03:51 AM      Lab Results   Component Value Date/Time    ASTSGOT 39 05/09/2020 03:51 AM    ALTSGPT 35 05/09/2020 03:51 AM     Lab Results   Component Value Date/Time    CHOLSTRLTOT 139 06/27/2013 09:25 AM    LDL 80 06/27/2013 09:25 AM    HDL 39 (A) 06/27/2013 09:25 AM    TRIGLYCERIDE 99 05/01/2020 11:00 PM    TROPONINT 68 (H) 05/01/2020 11:00 PM       No results for input(s): NTPROBNP in the last 72 hours.    Atrial flutter: The patient was cardioverted on the fifth and remained in sinus rhythm for a bit over a day.  Her pulmonary status is improving.  Discussed with the pulmonary physician and recommend we attempt to cardiovert her again prior to extubation.    She has severe pulmonary hypertension of uncertain etiology.  She had a CT scan of her chest on admission that confirmed there were no pulmonary emboli.    Nitish Dudley M.D.   Cardiologist, [facility]  [phone]

## 2020-05-10 PROBLEM — R94.31 PROLONGED Q-T INTERVAL ON ECG: Status: ACTIVE | Noted: 2020-01-01

## 2020-05-10 NOTE — CARE PLAN
Problem: Communication  Goal: The ability to communicate needs accurately and effectively will improve  Outcome: PROGRESSING AS EXPECTED  Note: Pt is currently intubated, but is able to express needs through witting on paper. Pt although not able to communicate with words is able to use gestures and body language to express needs as well.      Problem: Safety  Goal: Will remain free from falls  Outcome: PROGRESSING AS EXPECTED  Note: Pt is currently in bed with bed locked and in lowest position. Pt has been educated on using the call light, and jarrett so appropriately. The room is free and clear of all clutter, spills, and assistive devices. Will continue hourly rounding.

## 2020-05-10 NOTE — PROGRESS NOTES
"Critical Care Progress Note    Date of admission  5/1/2020    Chief Complaint  80 y.o. female admitted 5/1/2020 with increasing dyspnea, CHF versus pneumonia, severe pulmonary hypertension, intubated 5/1    Hospital Course    \"80 y.o. female who presented 5/1/2020 with respiratory failure with hypoxia.  She has a hx of hypertension, chf, aortic stenosis and was initially admitted to medical floor.  Per admission note 5 days of sob.  Worse when she lays down.  Diarrhea for 3 days.  No abdomianl pain.  Recently started on lasix outpatient due to lower ext edema and weight gain.  Transferred from [facility].  Labs were significant for renal failure with cr 2.6, troponin 0.08, wbc 12.  She has possible pneumonia on chest xray.  Covid suspected and negative test on admission.  Respiratory failure and transferred to ICU.  She had hypercapnic respiratory failure with hypoxia.  Lethargic. Intubated.  There were purulent secretions on intubation upper airway.  Bedside us showed enlarged right heart, I started on heparin drip. Avoid contrast exam for now given suspected covid of which may be reason for diarrhea.  Weaning o2 on ventilator after needing high peep. Levophed for shock.  Treating for pneumonia.  Given 500 ml IVF after intubation.  She is becoming hypervolemic at this time.  Added vasopressin.  Ongoing agitation despite propofol.  Required arterial line and central line.  No pericardial effusion, adequate LVEF at least moderate to normal function.  Started antibiotics.  Blood cx pending.  Moves all extremities and no focal deficits.\" -Dr Latif 5/1         Interval Problem Update  Reviewed last 24 hour events:    Case reviewed with cardiology at the bedside    Anxious, requiring more sedation  Follows commands and moves all 4  FENT 200  DEX 0.2 - 0.4 - low Bp, stopped  SR/ST since 2nd elective cardioversion 5/9  SBp 130s  One PRN Bp med - 170s  I/Os neg 145cc  UO adequate  TF goal Cortrak-45  Tm " 100.9  WBC improved at 13.3   Line sites look okay  VENT Day#9 - extubated once and failed with stridor - tight per Dr Lal  PEEP 8, 30% O2  Weans ok but no cuff leak for days  Secretions Min, nonpurulent  CXR min SSLLLA  Amiodarone/apixaban, no bleeding clinically  Losartan  Sodium 133  BUN 67/creatinine 1.11    Discontinue Lasix and potassium, follow BMP  Low dose propofol with fentanyl  Patient dropping BP with dexmedetomidine  No seroquel - QTc long  ABG with next SBT to assess for hypercarbia    Serial follow-up  Remains in sinus rhythm, hemodynamics unchanged, still no cuff leak and anxiety still an issue at times, consider CT neck in a.m. if no cuff leak versus extubation trial with bougie and/or bronchoscope?    Reviewed case w/ Dr Lal who recently re-intubated her - view good but tight VC and some resistance to pass tube beyond cords - will obtain CT neck/trachea     YESTERDAY  Case reviewed at bedside with cardiology, both agree we should do elective cardioversion before extubation and get her back in sinus rhythm which hopefully help her stay off the ventilator once extubated    Awake and follows  Anxious  AF 70-110s  SBp 120-160s  UO good  I/Os + 309cc  TF goal Cortrak  Tm 100.0  WBC 19.1  FENT 200  EOB [birth year]  VentDay#8  PEEP 8, 30%  CXR slt better opacities  Spont last stopped earlty  NIF 39, VC 1.1, RSBP 79  No cuff leak  Amiodarone  Eliquis    4 more dose Decadron, continue to check for adequate cuff leak  ABG with weaning, reassess for hypercarbia  PT/OT pending  Elective cardioversion    Patient anxious today and have follow-up failed cuff leak, not ready for liberation trial  Remains in sinus rhythm after cardioversion, patient transiently dropped her blood pressure.  Cardioversion requiring Sanket-Synephrine bolus, continue Eliquis and amiodarone for now    Review of Systems  Review of Systems   Unable to perform ROS: Acuity of condition   Remains on the ventilator    Vital Signs for last 24 hours    Temp:  [38.4 °C (101.1 °F)] 38.4 °C (101.1 °F)  Pulse:  [] 91  Resp:  [17-44] 20  BP: (100-150)/() 104/67  SpO2:  [83 %-100 %] 91 %    Hemodynamic parameters for last 24 hours       Respiratory Information for the last 24 hours  Vent Mode: APVCMV  Rate (breaths/min): 20  Vt Target (mL): 310  PEEP/CPAP: 8  P Support: 5  MAP: 13  Length of Weaning Trial (Hours): 1  Control VTE (exp VT): 379    Physical Exam   Physical Exam  Vitals signs and nursing note reviewed.   Constitutional:       Appearance: She is morbidly obese. She is ill-appearing (Improved). She is not toxic-appearing or diaphoretic.      Interventions: She is sedated, intubated and restrained.      Comments: Following, somnolent at times with dexmedetomidine   HENT:      Head: Normocephalic and atraumatic.      Right Ear: External ear normal.      Left Ear: External ear normal.      Nose: No congestion or rhinorrhea.      Mouth/Throat:      Mouth: Mucous membranes are dry.      Pharynx: No oropharyngeal exudate or posterior oropharyngeal erythema.   Eyes:      General: No scleral icterus.     Extraocular Movements: Extraocular movements intact.      Pupils: Pupils are equal, round, and reactive to light.   Neck:      Musculoskeletal: Neck supple. No neck rigidity or muscular tenderness.      Vascular: No JVD.   Cardiovascular:      Rate and Rhythm: Tachycardia present. Rhythm irregular. Occasional extrasystoles are present.     Pulses: Normal pulses.      Heart sounds: Normal heart sounds. No murmur. No friction rub. No gallop.       Comments: A flutter, after cardioversion sinus rhythm  Pulmonary:      Effort: Tachypnea present. No respiratory distress. She is intubated.      Breath sounds: Decreased breath sounds (Diffusely) and rhonchi (bilateral, no change) present. No wheezing or rales.      Comments: No cuff leak  Abdominal:      General: Abdomen is protuberant. There is no distension.      Palpations: Abdomen is soft. There is no  mass.      Tenderness: There is no abdominal tenderness. There is no right CVA tenderness, left CVA tenderness or guarding. Negative signs include Charles's sign.      Comments: Cortrak   Musculoskeletal:         General: No swelling or tenderness.      Right lower leg: Edema (Improved) present.      Left lower leg: Edema (Improved) present.   Lymphadenopathy:      Cervical: No cervical adenopathy.   Skin:     General: Skin is warm and dry.      Capillary Refill: Capillary refill takes less than 2 seconds.      Coloration: Skin is not cyanotic, jaundiced or pale.      Findings: No bruising, erythema, lesion or rash.      Nails: There is no clubbing.     Neurological:      General: No focal deficit present.      Mental Status: She is easily aroused. She is lethargic.      GCS: GCS eye subscore is 4. GCS verbal subscore is 1. GCS motor subscore is 6.      Cranial Nerves: No cranial nerve deficit.      Sensory: No sensory deficit.      Motor: No weakness.      Deep Tendon Reflexes: Reflexes normal.      Comments: Awake/agitated/anxious but will follow commands at times and moves all 4, withdraws all extremities symmetrically, brainstem reflexes intact   Psychiatric:         Mood and Affect: Mood is anxious.         Behavior: Behavior is agitated. Behavior is cooperative.      Comments: Unable to assess completely       Medications  Current Facility-Administered Medications   Medication Dose Route Frequency Provider Last Rate Last Dose   • propofol (DIPRIVAN) injection  0-80 mcg/kg/min Intravenous Continuous Mahad Sherman M.D. 2.8 mL/hr at 05/10/20 0950 5 mcg/kg/min at 05/10/20 0950   • hydrALAZINE (APRESOLINE) injection 20 mg  20 mg Intravenous Q6HRS PRN Nitish Lal Jr., D.O.       • labetalol (NORMODYNE/TRANDATE) injection 10 mg  10 mg Intravenous Q4HRS PRN Nitish Lal Jr., D.O.   10 mg at 05/10/20 0133   • Metoprolol Tartrate (LOPRESSOR) injection 5 mg  5 mg Intravenous Q5 MIN PRN Oziel Singh M.D.   5  mg at 05/09/20 0527   • famotidine (PEPCID) tablet 20 mg  20 mg Enteral Tube DAILY Mahad Sherman M.D.   20 mg at 05/10/20 0508   • senna-docusate (PERICOLACE or SENOKOT S) 8.6-50 MG per tablet 2 Tab  2 Tab Enteral Tube BID Nitish Lal Jr. D.O.   2 Tab at 05/10/20 0507    And   • polyethylene glycol/lytes (MIRALAX) PACKET 1 Packet  1 Packet Enteral Tube QDAY PRN Nitish Lal Jr. D.O.   1 Packet at 05/09/20 0522    And   • magnesium hydroxide (MILK OF MAGNESIA) suspension 30 mL  30 mL Enteral Tube QDAY PRN TIGRE Champagne Jr..O.   30 mL at 05/10/20 0726    And   • bisacodyl (DULCOLAX) suppository 10 mg  10 mg Rectal QDAY PRN TIGRE Champagne Jr..O.       • MD Alert...ICU Electrolyte Replacement per Pharmacy   Other PHARMACY TO DOSE TIGRE Champagne Jr..O.       • lidocaine (XYLOCAINE) 1 % injection 1-2 mL  1-2 mL Tracheal Tube Q30 MIN PRN TIGRE Champagne Jr..O.   1 mL at 05/07/20 2328   • oxyCODONE immediate-release (ROXICODONE) tablet 5 mg  5 mg Enteral Tube Q4HRS PRN Mahad Sherman M.D.        Or   • oxyCODONE immediate release (ROXICODONE) tablet 10 mg  10 mg Enteral Tube Q4HRS PRN Mahad Sherman M.D.   10 mg at 05/09/20 1007   • losartan (COZAAR) tablet 25 mg  25 mg Enteral Tube BID Mahad Sherman M.D.   25 mg at 05/10/20 0508   • fentaNYL (SUBLIMAZE) 50 mcg/mL in 50mL (Continuous Infusion)   Intravenous Continuous Mahad Sherman M.D. 4 mL/hr at 05/10/20 0620 200 mcg/hr at 05/10/20 0620   • apixaban (ELIQUIS) tablet 5 mg  5 mg Enteral Tube BID Nitish Dudley M.D.   5 mg at 05/10/20 0508   • amiodarone (CORDARONE) tablet 200 mg  200 mg Enteral Tube TWICE DAILY Nitish Dudley M.D.   200 mg at 05/10/20 0509   • Pharmacy Consult: Enteral tube insertion - review meds/change route/product selection  1 Each Other PHARMACY TO DOSE Vish Carrington M.D.       • acetaminophen (TYLENOL) tablet 650 mg  650 mg Enteral Tube Q6HRS PRN Vish Carrington M.D.       • allopurinol (ZYLOPRIM)  tablet 100 mg  100 mg Enteral Tube BID Vish Carrington M.D.   100 mg at 05/10/20 0507   • ascorbic acid tablet 500 mg  500 mg Enteral Tube DAILY Vish Carrington M.D.   500 mg at 05/10/20 0508   • cyclobenzaprine (FLEXERIL) tablet 10 mg  10 mg Enteral Tube TID PRN Vish Carrington M.D.       • Respiratory Therapy Consult   Nebulization Continuous RT Fernandez Latif M.D.       • ipratropium-albuterol (DUONEB) nebulizer solution  3 mL Nebulization Q2HRS PRN (RT) Fernandez Latif M.D.       • fentaNYL (SUBLIMAZE) injection 25 mcg  25 mcg Intravenous Q HOUR PRN Fernandez Latif M.D.   25 mcg at 05/07/20 0237    Or   • fentaNYL (SUBLIMAZE) injection 50 mcg  50 mcg Intravenous Q HOUR PRN Fernandez Latif M.D.   50 mcg at 05/09/20 1015    Or   • fentaNYL (SUBLIMAZE) injection 100 mcg  100 mcg Intravenous Q HOUR PRN Fernandez Latif M.D.   100 mcg at 05/10/20 0620       Fluids    Intake/Output Summary (Last 24 hours) at 5/10/2020 1412  Last data filed at 5/10/2020 1000  Gross per 24 hour   Intake 1035.08 ml   Output 1500 ml   Net -464.92 ml       Laboratory  Recent Labs     05/08/20  0301 05/10/20  0438   ISTATAPH 7.552* 7.521*   ISTATAPCO2 34.9 37.8*   ISTATAPO2 98* 118*   ISTATATCO2 32 32   [id number] 98 99   ISTATARTHCO3 30.7* 30.9*   ISTATARTBE 8* 8*   ISTATTEMP 37.4 C 37.9 C   ISTATFIO2 40 40   ISTATSPEC Arterial Arterial   ISTATAPHTC 7.546* 7.507*   [id number] 101* 124*         Recent Labs     05/08/20  0414 05/09/20  0351 05/10/20  0525   SODIUM 139 136 133*   POTASSIUM 3.8 4.1 3.9   CHLORIDE 94* 93* 92*   CO2 29 30 29   BUN 53* 64* 67*   CREATININE 1.18 0.99 1.11   MAGNESIUM 2.0 2.5 2.3   PHOSPHORUS 2.4* 3.6 3.9   CALCIUM 9.8 9.7 10.0     Recent Labs     05/08/20  0414 05/09/20  0351 05/10/20  0525   ALTSGPT  --  35  --    ASTSGOT  --  39  --    ALKPHOSPHAT  --  73  --    TBILIRUBIN  --  0.4  --    GLUCOSE 174* 178* 160*     Recent Labs     05/08/20  0414 05/09/20  0351 05/10/20  0525   WBC 20.4* 19.1* 13.3*   NEUTSPOLYS  90.70* 86.10* 79.70*   LYMPHOCYTES 3.40* 5.50* 11.00*   MONOCYTES 4.70 6.30 7.90   EOSINOPHILS 0.00 0.00 0.00   BASOPHILS 0.10 0.10 0.10   ASTSGOT  --  39  --    ALTSGPT  --  35  --    ALKPHOSPHAT  --  73  --    TBILIRUBIN  --  0.4  --      Recent Labs     05/08/20  0414 05/09/20  0351 05/10/20  0525   RBC 5.01 5.09 5.14   HEMOGLOBIN 13.2 13.4 13.5   HEMATOCRIT 42.5 43.6 43.7   PLATELETCT 144* 182 204       Imaging  X-Ray:  I have personally reviewed the images and compared with prior images.     Echo 5/2  CONCLUSIONS  No prior study is available for comparison.   Normal left ventricular chamber size.  Left ventricular ejection fraction is visually estimated to be greater   than 75%.  Severely dilated right ventricle.  Reduced right ventricular systolic function.  Mild aortic stenosis.  Estimated right ventricular systolic pressure  is 70 mmHg    Assessment/Plan  * Acute respiratory failure with hypoxia and hypercapnia (HCC)  Assessment & Plan  Initially intubated 5/1  Right heart failure and sepsis/pneumonia  Suspected covid initially, PCR for COVID negative x3 now, isolation discontinued  CTA negative for PE, left lower lobe atelectasis/pneumonia suspected, diffuse viral process not seen  Transitioned heparin drip to Xarelto for atrial flutter and for pulmonary hypertension  Extubation 5/6, failed with stridor and reintubated early a.m. 5/7  Decadron 4 mg IV every 6 hours complete 5/10  Monitor cuff leak test daily, no leak yet-CT scan neck?  ABG with weaning parameters when clinically ready  Early bridge with AVAPS with next extubation?  Consider nebulized lidocaine and/or racemic epinephrine prophylactically with next extubation  Monitor for need to extubate with bougie or bronchoscope  RT protocols/ventilator bundle  SAT/SBT as clinically appropriate  Mobilize as hemodynamics and respiratory status allow    Pulmonary hypertension (HCC)  Assessment & Plan  RVSP 70  Significant dilation left atrium  BMI 40 query  component of PREMA/OHS  Suspect chronic hypoxemia  CTA negative for PE 5/4  No clear valvular abnormalities beyond mild left ear on echo with preserved LV function  New onset atrial flutter 5/4, recurrent 5/6-7  Continue apixaban  Cardiology consulting  Resuming Cozaar, holding beta-blocker    Atrial flutter (HCC)  Assessment & Plan  intermittently with RVR, rate controlled except for with exertion  New onset 5/4  Recurrent 5/6-7 with recurrent respiratory failure and reintubation, complicated by requirement of norepinephrine infusion  Echo with severe pulmonary pretension and mild AS  CTA with no evidence of pulmonary embolus but dilated left atrium  Continue to optimize electrolytes  Continue apixaban and amiodarone enterally  Repeat elective cardioversion successful 5/9  Remains in sinus rhythm    Septic shock (HCC)  Assessment & Plan  This is Septic shock Present on admission  SIRS criteria identified on my evaluation include: Tachycardia, with heart rate greater than 90 BPM, Tachypnea, with respirations greater than 20 per minute and Leukocytosis, with WBC greater than 12,000  Source is likely pneumonia, + diarrhea,  covid ruled out with 3 serial negative studies  Presentation includes: hypervolemic with heart failure, unable to give sepsis bolus  \the patient remains hypotensive with systolic blood pressure less than 90 or MAP less than 65  Hemodynamic support with additional fluids and IV vasopressors as needed to maintain a SBP of 90 or MAP of 65  IV antibiotics as appropriate for source of sepsis  Reassessment: I have reassessed the patient's hemodynamic status      Acute on chronic diastolic heart failure (HCC)  Assessment & Plan  Right heart enlarged with deviated septum on initial bedside us  Echo noted, severe pulmonary pretension  Titrating norepinephrine infusion intermittently, limiting IV fluids, will avoid propofol for sedation due to to negative effects on BP  Resume losartan when hemodynamics  okay-5/5, hold beta-blocker for now per cardiology  COVID PCR negative x3, isolation removed  Initially treated with IV heparin, noninvasive study and CTA chest both negative for thrombus  Forced diuresis as BP allows, reducing dose of Lasix, BUN climbing and to a lesser degree creatinine  Morbidly obese, query OHS/PREMA  Cardiology following, reviewing with them daily  When extubated bridge with AVAPS for respiratory failure/heart failure-query PREMA    Pneumonia due to infectious organism  Assessment & Plan  Purulent secretions on intubation  Possibly from aspiration or cap  Antibiotics- cefepime and doxycycline with last day of treatment 5/6  COVID PCR negative daily x3 days-CT scan does not reveal diffuse viral pneumonic process  Cultures negative so far, no sputum culture ever sent?  WBC increased after initiation Decadron, monitor for HCAP    Aortic stenosis  Assessment & Plan  Contributory to right heart failure?  Mild echocardiogram report  Cardiology consultation noted    Acute renal failure (ARF) (HCC)  Assessment & Plan  Likely from poor perfusion from sepsis/cardiac dysfunction  BUN increasing and creatinine remains normal  Avoid nephrotoxins as able clinically  Renally dose medications were clinically appropriate  Monitor urine output and serial BMP  Lasix dose reduced 5/8, may need to discontinue for now?  Monitoring renal function  Optimize electrolytes    Essential hypertension  Assessment & Plan  Keep SBp < 160  Resume home regimen when clinically appropriate including Benicar, Cozaar and Toprol-XL   Resume Cozaar first, start with split dose of half what she was taking at home, reviewed with cardiology  Continue diuresis and if hemodynamically susceptible after resuming Cozaar consider adding back low-dose beta-blocker, was on 50 mg HL once a day, consider 12.5 mg every 12H if hemodynamics are acceptable    Prolonged Q-T interval on ECG  Assessment & Plan  QTc 501  Avoid QT prolonging agents  Optimize  electrolytes  Cardiac monitoring, repeat EKG as needed    Hypokalemia  Assessment & Plan  Replete, goal greater than 4.0, ERP  Also replete magnesium to greater than 2.0    Hypophosphatemia  Assessment & Plan  Replete, goal greater than 2.5, ERP    Suspected COVID-19 virus infection  Assessment & Plan  covid PCR x3 all negative, intermittently performed daily 5/1, 5/2 and 5/3  Diarrhea may be a sign of covid, improved  Respiratory failure, persist  CTA most consistent with left lower lobe atelectasis and small effusion, query pneumonia there, diffuse viral pneumonia or PE not seen  Okay discontinue isolation    Acute encephalopathy  Assessment & Plan  Primarily related to medications, improved, agitation more of an issue now  multifactorial etiology  Patient having hypotension with dexmedetomidine, will switch to low-dose propofol-continuing fentanyl as needed    Diarrhea  Assessment & Plan  Unclear etiology, doubt COVID, improved  Does not seem to have been treated with ABX prior  No bowel movement for the first 4 days, episode of diarrhea 5/5 and stool for C. difficile sent by nursing staff, it was ordered on admission, results negative  Continue to monitor     VTE:  Heparin  Ulcer: H2 Antagonist  Lines: Central Line  Ongoing indication addressed    I have performed a physical exam and reviewed and updated ROS and Plan today (5/10/2020). In review of yesterday's note (5/9/2020), there are no changes except as documented above.     Discussed patient condition and risk of morbidity and/or mortality with RN, RT, Pharmacy, Charge nurse / hot rounds, Patient and cardiology     Patient remains critically ill.  Patient remains on full vent support and as of yet has not had a cuff leak.  Anxiety has been problematic and she has been tachypneic tachycardic requiring increasing sedation.  Will begin obtaining blood gases with sedation vacations and SBT's and not early a.m. on full support of sleep.  3 days of Decadron every  6 completes today.  May need CT scan neck to assess airway versus extubation with bougie or bronchoscope.  Continuing amiodarone and Eliquis and monitoring for recurrence of atrial flutter, currently in sinus with heart failure under control radiographically and clinically and stopping Lasix at this point.  Consider bridging with AVAPS early.  Consider racemic epinephrine/lidocaine nebulizer immediately after extubation.  Prognosis remains guarded.  Patient remains at high risk for increased morbidity and mortality without above critical care interventions.    Critical care time = 45 minutes in directly providing and coordinating critical care and extensive data review.  No time overlap and excludes procedures.

## 2020-05-10 NOTE — CARE PLAN
Problem: Ventilation Defect:  Goal: Ability to achieve and maintain unassisted ventilation or tolerate decreased levels of ventilator support  Outcome: PROGRESSING SLOWER THAN EXPECTED      Ventilator Daily Summary    Vent Day #9    CMV, 20, 310, +8, 30%    Ventilator settings changed this shift:    Weaning trials: in progress    Respiratory Procedures:    Plan: Continue current ventilator settings and wean mechanical ventilation as tolerated per physician orders.

## 2020-05-10 NOTE — PROGRESS NOTES
Cardiology Follow Up Progress Note    Date of Service  5/10/2020    Attending Physician  Vish Carrington M.D.    Chief Complaint   Evaluation of atrial fibrillation and flutter    HPI  Airam Leavitt is a 80 y.o. female admitted 5/1/2020 with progressive dyspnea and acute kidney failure.  She developed respiratory failure was intubated.  Echo demonstrates significant pulmonary hypertension with pulmonary pressure of 70 mmHg on the initial echo.  LV function is normal to hyperkinetic.  The etiology of her pulmonary pretension is uncertain.  No evidence of pulmonary embolism by CT scanning.  She has been cardioverted twice during this hospitalization.  Yesterday she was cardioverted from flutter and has maintained sinus rhythm on amiodarone which we will use in the short-term.  The patient is very anxious and is still on the ventilator.      Review of Systems  Review of Systems   Unable to perform ROS: Intubated       Vital signs in last 24 hours  Pulse:  [] 101  Resp:  [17-44] 21  BP: ()/(52-96) 140/80  SpO2:  [83 %-100 %] 83 %    Physical Exam  Physical Exam  Constitutional:       Interventions: She is sedated, intubated and restrained.   HENT:      Head: Atraumatic.   Eyes:      General: No scleral icterus.     Pupils: Pupils are equal, round, and reactive to light.   Cardiovascular:      Rate and Rhythm: Regular rhythm. Tachycardia present.   Pulmonary:      Effort: She is intubated.      Breath sounds: No wheezing.   Abdominal:      General: Bowel sounds are normal. There is no distension.      Tenderness: There is no abdominal tenderness.   Skin:     General: Skin is warm and dry.   Neurological:      General: No focal deficit present.   Psychiatric:         Behavior: Behavior is agitated.         Lab Review  Lab Results   Component Value Date/Time    WBC 13.3 (H) 05/10/2020 05:25 AM    RBC 5.14 05/10/2020 05:25 AM    HEMOGLOBIN 13.5 05/10/2020 05:25 AM    HEMATOCRIT 43.7 05/10/2020 05:25 AM     MCV 85.0 05/10/2020 05:25 AM    MCH 26.3 (L) 05/10/2020 05:25 AM    MCHC 30.9 (L) 05/10/2020 05:25 AM    MPV 10.9 05/10/2020 05:25 AM      Lab Results   Component Value Date/Time    SODIUM 133 (L) 05/10/2020 05:25 AM    POTASSIUM 3.9 05/10/2020 05:25 AM    CHLORIDE 92 (L) 05/10/2020 05:25 AM    CO2 29 05/10/2020 05:25 AM    GLUCOSE 160 (H) 05/10/2020 05:25 AM    BUN 67 (H) 05/10/2020 05:25 AM    CREATININE 1.11 05/10/2020 05:25 AM      Lab Results   Component Value Date/Time    ASTSGOT 39 05/09/2020 03:51 AM    ALTSGPT 35 05/09/2020 03:51 AM     Lab Results   Component Value Date/Time    CHOLSTRLTOT 139 06/27/2013 09:25 AM    LDL 80 06/27/2013 09:25 AM    HDL 39 (A) 06/27/2013 09:25 AM    TRIGLYCERIDE 101 05/10/2020 05:25 AM    TROPONINT 68 (H) 05/01/2020 11:00 PM       Atrial fibrillation and flutter: The patient has been cardioverted twice, initially on May 5 then again on May 9.  She remained in sinus rhythm overnight.  It may be difficult to keep her in sinus rhythm because of her pulmonary hypertension.  We will keep her on amiodarone for the short-term but would not recommend this long-term because of her lung disease.    Pulmonary pretension: Etiology is unknown at this point.  There is no evidence of pulmonary embolism.  No major valvular disease.  LV function is hyperdynamic.  The patient has not smoked in many years per history.    Respiratory failure: Remains on the ventilator.  She is quite anxious this morning and has been sedated.    Aortic stenosis: Mean gradient is in the mild range.  I do not think this accounts for her pulmonary hypertension.  The gradient may have been overestimated as her LV systolic function was hyperdynamic at the time of the evaluation.    Please contact me with any questions.    Nitish Dudley M.D.   Cardiologist, [facility]  [phone]

## 2020-05-10 NOTE — PROGRESS NOTES
Received report and assumed pt care. Pt is intubated and on fentanyl infusion. Pt is currently anxious and agitated, attempting to self extubate. Per MD orders to start dex infusion, if pt hemodynamics does not tolerate dex then okay to start low dose propofol. VSS. Pt in bilateral soft wrist restraints.

## 2020-05-10 NOTE — ASSESSMENT & PLAN NOTE
QTc 501  Avoid QT prolonging agents  Optimize electrolytes  Cardiac monitoring, repeat EKG as needed

## 2020-05-10 NOTE — PROGRESS NOTES
"At 0625 the patient began to become highly agitated. The patient began mouthing \"help me\" and \"pull it out right now\" referencing her ETT. The patient began trying to pull lines and tubes regardless of the restraints. The MD was notified, and responded with orders to use dex as an additional sedation, and if that did not suffice, to use low dose propofol. Dex was initiated, and the patient Is now stable.   "

## 2020-05-10 NOTE — CARE PLAN
Problem: Mobility  Goal: Risk for activity intolerance will decrease  Outcome: PROGRESSING AS EXPECTED  Pt sat edge of bed and stood  X3 assist     Problem: Psychosocial Needs:  Goal: Level of anxiety will decrease  Outcome: PROGRESSING SLOWER THAN EXPECTED  Anxiety and agitation decreased from beginning of shift, pt at ease

## 2020-05-11 NOTE — THERAPY
Physical Therapy Contact Note    Attempted PT treatment session. RN reports pt was extubated not too long ago and gennaro like to minimize exertion at this time. Will re-attempt tomorrow as able/appropriate.

## 2020-05-11 NOTE — DISCHARGE PLANNING
Care Transition Team Assessment  Pt currently intubate. Spoke with friend Sabina [phone]. Per Sabina, pt has brother in law Ed Tonie [phone] and a few nieces and all other family members had passed away. Pt lives alone, independent with ADL's, drives, and uses cane as needed per Sabina. Updated facesheet info. Called brother in law Ed and left VM.  1115: spoke with ED. Ed and pt are on good terms and talk to each regularly but Ed wishes medical decisions be deferred to friend Sabina first and Ed willing to assist if needed    Information Source  Orientation : Oriented x 4(pt able to communicate through writting and gestures)  Information Given By: Friend  Informant's Name: Sabina Walker  Who is responsible for making decisions for patient? : Patient    Readmission Evaluation  Is this a readmission?: No    Elopement Risk  Legal Hold: No  Ambulatory or Self Mobile in Wheelchair: No-Not an Elopement Risk  Elopement Risk: Not at Risk for Elopement    Interdisciplinary Discharge Planning  Primary Care Physician: Ken Piña DO, per facesheet  Lives with - Patient's Self Care Capacity: Alone and Able to Care For Self  Support Systems: Family Member(s), Friends / Neighbors  Housing / Facility: 1 hospitals(address on facesheet, [address] [facility])  Prior Services: Home-Independent  Assistance Needed: Unknown at this Time  Durable Medical Equipment: Unknown    Discharge Preparedness  What is your plan after discharge?: Uncertain - pending medical team collaboration  What are your discharge supports?: Other (comment)  Prior Functional Level: Ambulatory, Drives Self, Independent with Activities of Daily Living    Functional Assesment  Prior Functional Level: Ambulatory, Drives Self, Independent with Activities of Daily Living    Finances  Financial Barriers to Discharge: No  Prescription Coverage: Yes([facility])    Vision / Hearing Impairment  Vision Impairment : Yes  Right Eye Vision: Impaired, Wears Glasses  Left Eye  Vision: Impaired, Wears Glasses         Advance Directive  Advance Directive?: None    Domestic Abuse  Have you ever been the victim of abuse or violence?: No         Discharge Risks or Barriers  Discharge risks or barriers?: No    Anticipated Discharge Information  Anticipated discharge disposition: Discharge needs currently unknown  Discharge Address: [address] [place] [id number]  Discharge Contact Phone Number: [phone]

## 2020-05-11 NOTE — PROGRESS NOTES
Dr. Singh notified about patient's decreased UO throughout shift. Dr. Singh also notified on difficulty advancing the pereira suction. Received orders to administer 500 bolus of LR, and to increase pt sedation to allow the pt to relax and suction appropriately. Pt propofol was increased to 10 mcg/min, and the 500 mL bolus was administered. Will continue to monitor.

## 2020-05-11 NOTE — CARE PLAN
Problem: Ventilation Defect:  Goal: Ability to achieve and maintain unassisted ventilation or tolerate decreased levels of ventilator support  Outcome: PROGRESSING SLOWER THAN EXPECTED      Ventilator Daily Summary    Vent Day #10    CMV, 20, 310, +8, 30%    Ventilator settings changed this shift:    Weaning trials: in progress    Respiratory Procedures:    Plan: Continue current ventilator settings and wean mechanical ventilation as tolerated per physician orders.

## 2020-05-11 NOTE — PROGRESS NOTES
"Critical Care Progress Note    Date of admission  5/1/2020    Chief Complaint  80 y.o. female admitted 5/1/2020 with increasing dyspnea, CHF versus pneumonia, severe pulmonary hypertension, intubated 5/1    Hospital Course    \"80 y.o. female who presented 5/1/2020 with respiratory failure with hypoxia.  She has a hx of hypertension, chf, aortic stenosis and was initially admitted to medical floor.  Per admission note 5 days of sob.  Worse when she lays down.  Diarrhea for 3 days.  No abdomianl pain.  Recently started on lasix outpatient due to lower ext edema and weight gain.  Transferred from [facility].  Labs were significant for renal failure with cr 2.6, troponin 0.08, wbc 12.  She has possible pneumonia on chest xray.  Covid suspected and negative test on admission.  Respiratory failure and transferred to ICU.  She had hypercapnic respiratory failure with hypoxia.  Lethargic. Intubated.  There were purulent secretions on intubation upper airway.  Bedside us showed enlarged right heart, I started on heparin drip. Avoid contrast exam for now given suspected covid of which may be reason for diarrhea.  Weaning o2 on ventilator after needing high peep. Levophed for shock.  Treating for pneumonia.  Given 500 ml IVF after intubation.  She is becoming hypervolemic at this time.  Added vasopressin.  Ongoing agitation despite propofol.  Required arterial line and central line.  No pericardial effusion, adequate LVEF at least moderate to normal function.  Started antibiotics.  Blood cx pending.  Moves all extremities and no focal deficits.\" -Dr Bhavya 5/1         Interval Problem Update  Reviewed last 24 hour events:  Improved anxiety  On ventilator  200 fent and 3 prop drips for sedation  rass 0 to -1  t max 99.5  Pending bm, distended abdomen  Right nare core trak  Tube feeds at goal  Bridges in place  Lines right triple  IJ  Edge of bed  Able to stand  Finished steroids 5/10  Adequate vent settings and " abg  pepcid for gi  No ab  Wbc 15.9  No new cultures  Lasix stopped yesterday    Addendum:  Extubated, precautions available however did not require bougie for extubation  With vent off et tube adequae air flow  Given dose of racemic epi  Lasix 40 mg once with extubation  One dose solumedrol 62.5 mg    Review of Systems  Review of Systems   Unable to perform ROS: Acuity of condition   Remains on the ventilator    Vital Signs for last 24 hours   Temp:  [37.8 °C (100 °F)-38.4 °C (101.1 °F)] 37.8 °C (100 °F)  Pulse:  [] 97  Resp:  [15-36] 20  BP: ()/() 125/82  SpO2:  [90 %-97 %] 93 %    Hemodynamic parameters for last 24 hours       Respiratory Information for the last 24 hours  Vent Mode: APVCMV  Rate (breaths/min): 20  Vt Target (mL): 310  PEEP/CPAP: 8  P Support: 5  MAP: 12  Length of Weaning Trial (Hours): 1  Control VTE (exp VT): 320    Physical Exam   Physical Exam  Vitals signs and nursing note reviewed.   Constitutional:       Appearance: She is morbidly obese. She is ill-appearing (Improved). She is not toxic-appearing or diaphoretic.      Interventions: She is sedated, intubated and restrained.      Comments: Following, somnolent at times with dexmedetomidine   HENT:      Head: Normocephalic and atraumatic.      Right Ear: External ear normal.      Left Ear: External ear normal.      Nose: No congestion or rhinorrhea.      Mouth/Throat:      Mouth: Mucous membranes are dry.      Pharynx: No oropharyngeal exudate or posterior oropharyngeal erythema.   Eyes:      General: No scleral icterus.     Extraocular Movements: Extraocular movements intact.      Pupils: Pupils are equal, round, and reactive to light.   Neck:      Musculoskeletal: Neck supple. No neck rigidity or muscular tenderness.      Vascular: No JVD.   Cardiovascular:      Rate and Rhythm: Tachycardia present. Rhythm irregular. Occasional extrasystoles are present.     Pulses: Normal pulses.      Heart sounds: Normal heart sounds.  No murmur. No friction rub. No gallop.       Comments: A flutter, after cardioversion sinus rhythm  Pulmonary:      Effort: Tachypnea present. No respiratory distress. She is intubated.      Breath sounds: Decreased breath sounds (Diffusely) and rhonchi (bilateral, no change) present. No wheezing or rales.      Comments: No cuff leak  Abdominal:      General: Abdomen is protuberant. There is no distension.      Palpations: Abdomen is soft. There is no mass.      Tenderness: There is no abdominal tenderness. There is no right CVA tenderness, left CVA tenderness or guarding. Negative signs include Charles's sign.      Comments: Cortrak   Musculoskeletal:         General: No swelling or tenderness.      Right lower leg: Edema (Improved) present.      Left lower leg: Edema (Improved) present.   Lymphadenopathy:      Cervical: No cervical adenopathy.   Skin:     General: Skin is warm and dry.      Capillary Refill: Capillary refill takes less than 2 seconds.      Coloration: Skin is not cyanotic, jaundiced or pale.      Findings: No bruising, erythema, lesion or rash.      Nails: There is no clubbing.     Neurological:      General: No focal deficit present.      Mental Status: She is easily aroused. She is lethargic.      GCS: GCS eye subscore is 4. GCS verbal subscore is 1. GCS motor subscore is 6.      Cranial Nerves: No cranial nerve deficit.      Sensory: No sensory deficit.      Motor: No weakness.      Deep Tendon Reflexes: Reflexes normal.      Comments: Awake/agitated/anxious but will follow commands at times and moves all 4, withdraws all extremities symmetrically, brainstem reflexes intact   Psychiatric:         Mood and Affect: Mood is anxious.         Behavior: Behavior is agitated. Behavior is cooperative.      Comments: Unable to assess completely       Medications  Current Facility-Administered Medications   Medication Dose Route Frequency Provider Last Rate Last Dose   • LACTATED RINGERS IV SOLN             • propofol (DIPRIVAN) injection  0-80 mcg/kg/min Intravenous Continuous Mahad Sherman M.D. 1.7 mL/hr at 05/11/20 0626 3 mcg/kg/min at 05/11/20 0626   • hydrALAZINE (APRESOLINE) injection 20 mg  20 mg Intravenous Q6HRS PRN Nitish Lal Jr. D.O.       • labetalol (NORMODYNE/TRANDATE) injection 10 mg  10 mg Intravenous Q4HRS PRN TIGRE Champagne Jr..O.   10 mg at 05/10/20 0133   • Metoprolol Tartrate (LOPRESSOR) injection 5 mg  5 mg Intravenous Q5 MIN PRN Oziel Singh M.D.   5 mg at 05/09/20 0527   • famotidine (PEPCID) tablet 20 mg  20 mg Enteral Tube DAILY Mahda Sherman M.D.   20 mg at 05/11/20 0534   • senna-docusate (PERICOLACE or SENOKOT S) 8.6-50 MG per tablet 2 Tab  2 Tab Enteral Tube BID TIGRE Champagne Jr..O.   2 Tab at 05/11/20 0534    And   • polyethylene glycol/lytes (MIRALAX) PACKET 1 Packet  1 Packet Enteral Tube QDAY PRN TIGRE Champagne Jr..O.   1 Packet at 05/09/20 0522    And   • magnesium hydroxide (MILK OF MAGNESIA) suspension 30 mL  30 mL Enteral Tube QDAY PRN TIGRE Champagne Jr..O.   30 mL at 05/10/20 0726    And   • bisacodyl (DULCOLAX) suppository 10 mg  10 mg Rectal QDAY PRN Nitish Lal Jr. D.O.   10 mg at 05/11/20 0535   • MD Alert...ICU Electrolyte Replacement per Pharmacy   Other PHARMACY TO DOSE TIGRE Champagne Jr..O.       • lidocaine (XYLOCAINE) 1 % injection 1-2 mL  1-2 mL Tracheal Tube Q30 MIN PRN TIGRE Champagne Jr..O.   1 mL at 05/07/20 2328   • oxyCODONE immediate-release (ROXICODONE) tablet 5 mg  5 mg Enteral Tube Q4HRS PRN Mahad Sherman M.D.        Or   • oxyCODONE immediate release (ROXICODONE) tablet 10 mg  10 mg Enteral Tube Q4HRS PRN Mahad Sherman M.D.   10 mg at 05/09/20 1007   • losartan (COZAAR) tablet 25 mg  25 mg Enteral Tube BID Mahad Sherman M.D.   25 mg at 05/11/20 0534   • fentaNYL (SUBLIMAZE) 50 mcg/mL in 50mL (Continuous Infusion)   Intravenous Continuous Mahad Sherman M.D. 379.2 mL/hr at 05/11/20 0550 200  mcg/kg/hr at 05/11/20 0550   • apixaban (ELIQUIS) tablet 5 mg  5 mg Enteral Tube BID Nitish Dudley M.D.   5 mg at 05/11/20 0534   • amiodarone (CORDARONE) tablet 200 mg  200 mg Enteral Tube TWICE DAILY Nitish Dudley M.D.   200 mg at 05/11/20 0534   • Pharmacy Consult: Enteral tube insertion - review meds/change route/product selection  1 Each Other PHARMACY TO DOSE Vish Carrington M.D.       • acetaminophen (TYLENOL) tablet 650 mg  650 mg Enteral Tube Q6HRS PRN Vish Carrington M.D.   650 mg at 05/10/20 1812   • allopurinol (ZYLOPRIM) tablet 100 mg  100 mg Enteral Tube BID Vish Carrington M.D.   100 mg at 05/11/20 0600   • ascorbic acid tablet 500 mg  500 mg Enteral Tube DAILY Vish Carrington M.D.   500 mg at 05/11/20 0533   • cyclobenzaprine (FLEXERIL) tablet 10 mg  10 mg Enteral Tube TID PRN Vish Carrington M.D.       • Respiratory Therapy Consult   Nebulization Continuous RT Fernandez Latif M.D.       • ipratropium-albuterol (DUONEB) nebulizer solution  3 mL Nebulization Q2HRS PRN (RT) Fernandez Latif M.D.       • fentaNYL (SUBLIMAZE) injection 25 mcg  25 mcg Intravenous Q HOUR PRN Fernandez Latif M.D.   25 mcg at 05/07/20 0237    Or   • fentaNYL (SUBLIMAZE) injection 50 mcg  50 mcg Intravenous Q HOUR PRN Fernandez Latif M.D.   50 mcg at 05/09/20 1015    Or   • fentaNYL (SUBLIMAZE) injection 100 mcg  100 mcg Intravenous Q HOUR PRN Fernandez Latif M.D.   100 mcg at 05/11/20 0551       Fluids    Intake/Output Summary (Last 24 hours) at 5/11/2020 0643  Last data filed at 5/11/2020 0600  Gross per 24 hour   Intake 2145.01 ml   Output 1255 ml   Net 890.01 ml       Laboratory  Recent Labs     05/10/20  0438 05/10/20  1615 05/11/20  0544   ISTATAPH 7.521* 7.454 7.426   ISTATAPCO2 37.8* 48.2* 48.0*   ISTATAPO2 118* 69 72   ISTATATCO2 32 35* 33   [id number] 99 94 94   ISTATARTHCO3 30.9* 33.8* 31.5*   ISTATARTBE 8* 8* 6*   ISTATTEMP 37.9 C 37.9 C 37.2 C   ISTATFIO2 40 30 30   ISTATSPEC Arterial Arterial Arterial    ISTATAPHTC 7.507* 7.440 7.423   [id number] 124* 73 73         Recent Labs     05/09/20  0351 05/10/20  0525 05/11/20  0530   SODIUM 136 133* 139   POTASSIUM 4.1 3.9 4.0   CHLORIDE 93* 92* 96   CO2 30 29 30   BUN 64* 67* 85*   CREATININE 0.99 1.11 1.21   MAGNESIUM 2.5 2.3  --    PHOSPHORUS 3.6 3.9  --    CALCIUM 9.7 10.0 9.9     Recent Labs     05/09/20  0351 05/10/20  0525 05/11/20  0530   ALTSGPT 35  --  38   ASTSGOT 39  --  31   ALKPHOSPHAT 73  --  63   TBILIRUBIN 0.4  --  0.7   GLUCOSE 178* 160* 117*     Recent Labs     05/09/20  0351 05/10/20  0525 05/11/20  0530   WBC 19.1* 13.3* 15.9*   NEUTSPOLYS 86.10* 79.70* 69.00   LYMPHOCYTES 5.50* 11.00* 18.90*   MONOCYTES 6.30 7.90 10.60   EOSINOPHILS 0.00 0.00 0.10   BASOPHILS 0.10 0.10 0.10   ASTSGOT 39  --  31   ALTSGPT 35  --  38   ALKPHOSPHAT 73  --  63   TBILIRUBIN 0.4  --  0.7     Recent Labs     05/09/20  0351 05/10/20  0525 05/11/20  0530   RBC 5.09 5.14 5.23   HEMOGLOBIN 13.4 13.5 13.7   HEMATOCRIT 43.6 43.7 44.6   PLATELETCT 182 204 218       Imaging  X-Ray:  I have personally reviewed the images and compared with prior images.     Echo 5/2  CONCLUSIONS  No prior study is available for comparison.   Normal left ventricular chamber size.  Left ventricular ejection fraction is visually estimated to be greater   than 75%.  Severely dilated right ventricle.  Reduced right ventricular systolic function.  Mild aortic stenosis.  Estimated right ventricular systolic pressure  is 70 mmHg    Assessment/Plan  * Acute respiratory failure with hypoxia and hypercapnia (HCC)  Assessment & Plan  Initially intubated 5/1  Right heart failure and sepsis/pneumonia  Suspected covid initially, PCR for COVID negative x3 now, isolation discontinued  CTA negative for PE, left lower lobe atelectasis/pneumonia suspected, diffuse viral process not seen  Transitioned heparin drip to Xarelto for atrial flutter and for pulmonary hypertension  Extubation 5/6, failed with stridor and  reintubated early a.m. 5/7  Decadron 4 mg IV every 6 hours complete 5/10  Monitor cuff leak test daily, no leak yet-CT scan neck?  ABG with weaning parameters when clinically ready  Early bridge with AVAPS with next extubation?  Consider nebulized lidocaine and/or racemic epinephrine prophylactically with next extubation  Monitor for need to extubate with bougie or bronchoscope  RT protocols/ventilator bundle  SAT/SBT as clinically appropriate  Mobilize as hemodynamics and respiratory status allow  Finished dexamethasone 5/10    Pulmonary hypertension (HCC)  Assessment & Plan  RVSP 70  Significant dilation left atrium  BMI 40 query component of PREMA/OHS  Suspect chronic hypoxemia  CTA negative for PE 5/4  No clear valvular abnormalities beyond mild left ear on echo with preserved LV function  New onset atrial flutter 5/4, recurrent 5/6-7  Continue apixaban  Cardiology consulting  Resuming Cozaar, bb has been restarted    Atrial flutter ([facility])  Assessment & Plan  intermittently with RVR, rate controlled except for with exertion  New onset 5/4  Recurrent 5/6-7 with recurrent respiratory failure and reintubation, complicated by requirement of norepinephrine infusion  Echo with severe pulmonary pretension and mild AS  CTA with no evidence of pulmonary embolus but dilated left atrium  Continue to optimize electrolytes  Continue apixaban and amiodarone enterally  Repeat elective cardioversion successful 5/9  Remains in sinus rhythm, contnued on amiodarone    Septic shock ([facility])  Assessment & Plan  This is Septic shock Present on admission  SIRS criteria identified on my evaluation include: Tachycardia, with heart rate greater than 90 BPM, Tachypnea, with respirations greater than 20 per minute and Leukocytosis, with WBC greater than 12,000  Source is likely pneumonia, + diarrhea,  covid ruled out with 3 serial negative studies  Presentation includes: hypervolemic with heart failure, unable to give sepsis bolus  \the patient  remains hypotensive with systolic blood pressure less than 90 or MAP less than 65  Hemodynamic support with additional fluids and IV vasopressors as needed to maintain a SBP of 90 or MAP of 65  IV antibiotics as appropriate for source of sepsis  Reassessment: I have reassessed the patient's hemodynamic status      Acute on chronic diastolic heart failure (HCC)  Assessment & Plan  Right heart enlarged with deviated septum on initial bedside us  Echo noted, severe pulmonary pretension  Titrating norepinephrine infusion intermittently, limiting IV fluids, will avoid propofol for sedation due to to negative effects on BP  On losartan and metoprolol  Diuresis  Joaquin/obesity contributory      Pneumonia due to infectious organism  Assessment & Plan  Purulent secretions on intubation  Possibly from aspiration or cap  Antibiotics- cefepime and doxycycline with last day of treatment 5/6  COVID PCR negative daily x3 days-CT scan does not reveal diffuse viral pneumonic process  Cultures negative so far, no sputum culture ever sent?  WBC increased after initiation Decadron, monitor for HCAP    Aortic stenosis  Assessment & Plan  Contributory to right heart failure?  Mild echocardiogram report  Cardiology consultation noted    Acute renal failure (ARF) (HCC)  Assessment & Plan  Likely from poor perfusion from sepsis/cardiac dysfunction  BUN increasing and creatinine remains normal  Avoid nephrotoxins as able clinically  Renally dose medications were clinically appropriate  Monitor urine output and serial BMP  Lasix dose reduced 5/8, may need to discontinue for now?  Monitoring renal function  Optimize electrolytes    Essential hypertension  Assessment & Plan  Keep SBp < 160  On cozaar and metoprolol from home    Prolonged Q-T interval on ECG  Assessment & Plan  QTc 501  Avoid QT prolonging agents  Optimize electrolytes  Cardiac monitoring, repeat EKG as needed    Hypokalemia  Assessment & Plan  Replete, goal greater than 4.0,  ERP  Also replete magnesium to greater than 2.0    Hypophosphatemia  Assessment & Plan  Replete, goal greater than 2.5, ERP    Suspected COVID-19 virus infection  Assessment & Plan  covid PCR x3 all negative, intermittently performed daily 5/1, 5/2 and 5/3  Diarrhea may be a sign of covid, improved  Respiratory failure, persist  CTA most consistent with left lower lobe atelectasis and small effusion, query pneumonia there, diffuse viral pneumonia or PE not seen  Okay discontinue isolation    Acute encephalopathy  Assessment & Plan  Primarily related to medications, improved, agitation more of an issue now  multifactorial etiology  Patient having hypotension with dexmedetomidine, will switch to low-dose propofol-continuing fentanyl as needed    Diarrhea  Assessment & Plan  Unclear etiology, doubt COVID, improved  Does not seem to have been treated with ABX prior  No bowel movement for the first 4 days, episode of diarrhea 5/5 and stool for C. difficile sent by nursing staff, it was ordered on admission, results negative  Continue to monitor     VTE:  Heparin  Ulcer: H2 Antagonist  Lines: Central Line  Ongoing indication addressed    I have performed a physical exam and reviewed and updated ROS and Plan today (5/11/2020). In review of yesterday's note (5/10/2020), there are no changes except as documented above.     Discussed patient condition and risk of morbidity and/or mortality with RN, RT, Pharmacy, Charge nurse / hot rounds, Patient and cardiology     Patient is critically ill. The patient continues to have respiratory failure requiring a mechanical ventilator. SBT.  Levophed for map > 65 and sbp > 90.  Propofol and fentanyl drips for sedation.  If untreated there is a high chance of deterioration and eventually death.  SBT, extubated, tolerated well on multiple reassessments. The critical that has been undertaken is medically complex. There has been no overlap in critical care time. Critical Care Time not  including procedures: 55 minutes.

## 2020-05-11 NOTE — PROGRESS NOTES
Cardiology Follow Up Progress Note    Date of Service  5/11/2020    Attending Physician  Fernandez Latif M.D.    Reason for consult  Atrial fibrillation/flutter    HPI  Airam Leavitt is a 80 y.o. female admitted with respiratory failure and found to have atrial fibrillation/flutter.    Interim Events  Patient extubated this morning.  Having difficulty talking otherwise feels well.  No dyspnea.    Patient has never been evaluated for sleep apnea but endorses that she has been told that she snores at night.  She also reports feeling daytime fatigue and somnolence.    Review of Systems  Review of Systems   Constitutional: Negative for malaise/fatigue.   Respiratory: Negative for shortness of breath.    Cardiovascular: Negative for chest pain.   Gastrointestinal: Negative for abdominal pain.   Neurological: Negative for dizziness.   All other systems reviewed and are negative.      Vital signs in last 24 hours  Temp:  [37.8 °C (100 °F)-38.4 °C (101.1 °F)] 37.8 °C (100 °F)  Pulse:  [] 97  Resp:  [15-36] 20  BP: ()/(58-88) 125/82  SpO2:  [90 %-97 %] 93 %    Physical Exam  Physical Exam   Constitutional: She is oriented to person, place, and time. No distress.   Cardiovascular: Normal rate and regular rhythm. Exam reveals no gallop and no friction rub.   Murmur heard.   Crescendo decrescendo systolic murmur is present with a grade of 1/6.  Pulmonary/Chest: Effort normal and breath sounds normal. No respiratory distress. She has no wheezes. She has no rales.   Abdominal: Soft. She exhibits no distension. There is no abdominal tenderness.   Musculoskeletal:         General: No edema.   Neurological: She is alert and oriented to person, place, and time.   Skin: Skin is warm and dry. She is not diaphoretic.   Nursing note and vitals reviewed.      Lab Review  Recent Labs     05/09/20  0351 05/10/20  0525 05/11/20  0530   WBC 19.1* 13.3* 15.9*   RBC 5.09 5.14 5.23   HEMOGLOBIN 13.4 13.5 13.7   HEMATOCRIT 43.6  43.7 44.6   PLATELETCT 182 204 218     Recent Labs     05/09/20  0351 05/10/20  0525 05/11/20  0530   SODIUM 136 133* 139   POTASSIUM 4.1 3.9 4.0   CHLORIDE 93* 92* 96   CO2 30 29 30   GLUCOSE 178* 160* 117*   BUN 64* 67* 85*   CREATININE 0.99 1.11 1.21      Recent Labs     05/09/20  0351 05/10/20  0525 05/11/20  0530   ALTSGPT 35  --  38   ASTSGOT 39  --  31   ALKPHOSPHAT 73  --  63   TBILIRUBIN 0.4  --  0.7   GLUCOSE 178* 160* 117*           Lab Results   Component Value Date/Time    CHOLSTRLTOT 139 06/27/2013 09:25 AM    LDL 80 06/27/2013 09:25 AM    HDL 39 (A) 06/27/2013 09:25 AM    TRIGLYCERIDE 101 05/10/2020 05:25 AM    TROPONINT 68 (H) 05/01/2020 11:00 PM       Labs reviewed as noted above.    Cardiac Imaging and Procedures Review  Telemetry reviewed and showed sinus rhythm.    Assessment/Plan    Paroxysmal atrial fibrillation/flutter:  Hypoxic/hypercapnic respiratory failure:  Pulmonary hypertension:  Aortic stenosis:    Patient is maintaining sinus rhythm.  Continue amiodarone oral loading.  Blood pressure borderline, will hold off AV john blocking agents for now.  Continue Eliquis.  Patient is tolerating this well.  Her pulmonary hypertension is likely secondary to undiagnosed sleep apnea given her history.  Would recommend an outpatient sleep study.    Recommend outpatient follow-up in cardiology clinic as well.  We will sign off.  Please call with any further questions. Thank you      Debora Moraes MD, [facility]  Cardiologist  [facility]

## 2020-05-11 NOTE — RESPIRATORY CARE
Extubation    Cuff leak noted -Yes   Stridor present -No  $ SVN Performed: Yes (05/11/20 1105)  FiO2%: 30 % (05/11/20 0634)  O2 (LPM): 2 (05/11/20 1050)     Patient toleration -Tolerated well with no complications    Events/Summary/Plan: Extubation per Dr. Latif (05/11/20 1050)

## 2020-05-11 NOTE — PROGRESS NOTES
Pt began coughing, RN attempted to advance pereira suction catheter however met resistance after advancing only a few inches. RN called RT. RT also met resistance when attempting to suction. Noted it was more difficult when the pt was swallowing. Dr. Tanner paged to update.

## 2020-05-11 NOTE — CARE PLAN
Problem: Safety  Goal: Will remain free from falls  Outcome: PROGRESSING AS EXPECTED  Note: Pt is currently in bed with it locked and in lowest position. Three bed rails are raised. There room is free of clutter and spills and no assistive devices are in the room. The patient has on non skid footwear, and the bed alarm is on and in place. Will continue hourly rounding.      Problem: Venous Thromboembolism (VTW)/Deep Vein Thrombosis (DVT) Prevention:  Goal: Patient will participate in Venous Thrombosis (VTE)/Deep Vein Thrombosis (DVT)Prevention Measures  Outcome: PROGRESSING AS EXPECTED  Flowsheets  Taken 5/10/2020 2000  Risk Assessment Score: 2  VTE RISK: Moderate  Mechanical Prophylaxis: SCDs, Sequential Compression Device  Pharmacologic Prophylaxis Used: Apixaban  Taken 5/10/2020 2024  SCDs, Sequential Compression Device: On  Note: The patients SCDs are on and in place. The patient is receiving the prophylactic anticoagulant as ordered. Will continue to assess and monitor.

## 2020-05-12 PROBLEM — Z20.822 SUSPECTED COVID-19 VIRUS INFECTION: Status: RESOLVED | Noted: 2020-01-01 | Resolved: 2020-01-01

## 2020-05-12 NOTE — PROGRESS NOTES
"Critical Care Progress Note    Date of admission  5/1/2020    Chief Complaint  80 y.o. female admitted 5/1/2020 with increasing dyspnea, CHF versus pneumonia, severe pulmonary hypertension, intubated 5/1    Hospital Course    \"80 y.o. female who presented 5/1/2020 with respiratory failure with hypoxia.  She has a hx of hypertension, chf, aortic stenosis and was initially admitted to medical floor.  Per admission note 5 days of sob.  Worse when she lays down.  Diarrhea for 3 days.  No abdomianl pain.  Recently started on lasix outpatient due to lower ext edema and weight gain.  Transferred from [facility].  Labs were significant for renal failure with cr 2.6, troponin 0.08, wbc 12.  She has possible pneumonia on chest xray.  Covid suspected and negative test on admission.  Respiratory failure and transferred to ICU.  She had hypercapnic respiratory failure with hypoxia.  Lethargic. Intubated.  There were purulent secretions on intubation upper airway.  Bedside us showed enlarged right heart, I started on heparin drip. Avoid contrast exam for now given suspected covid of which may be reason for diarrhea.  Weaning o2 on ventilator after needing high peep. Levophed for shock.  Treating for pneumonia.  Given 500 ml IVF after intubation.  She is becoming hypervolemic at this time.  Added vasopressin.  Ongoing agitation despite propofol.  Required arterial line and central line.  No pericardial effusion, adequate LVEF at least moderate to normal function.  Started antibiotics.  Blood cx pending.  Moves all extremities and no focal deficits.\" -Dr Latif 5/1         Interval Problem Update  Reviewed last 24 hour events:  Neuro: aox2-3 delirium follows move all   HR: snr 80-90's  SBP: levophed 8   Tmax: 98  GI: distended hypoactive last BM 5/10  UOP: 800ml  Lines: central, art line  Resp: pep IS 750ml cough with secretions now with oxy mask 8l    Vte: apixaban  PPI/H2:n/a  Antibx: finished C3 and " doxy    lasartan held this since pressors am d/c now  Midodrine 10mg TID  levophed  Inc leukocytosis post steroids  Low threshold for antibiotic  Passive leg raise no response  Post hypertonic with worsening saturation now on 8l  Discussed plan to mobilize  Sputum cx  Mucinex, hypertonic saline  MRSA nasal    Patient had worsening mental status ABG drawn 7.22/82/75   Placed on bipap patient able to pull at mask  Bedside echo with dilated RV mild pressure vol overload  Will check lactate, ammonia, CXR, cbc, bmp, random cortisol, and start antibioitcs  Repeat ABG in one hour   Zosyn     Review of Systems  Review of Systems   Constitutional: Positive for malaise/fatigue. Negative for fever.   HENT: Positive for sore throat. Negative for congestion, ear pain and hearing loss.    Eyes: Negative for blurred vision and pain.   Respiratory: Positive for cough and sputum production. Negative for shortness of breath, wheezing and stridor.    Cardiovascular: Negative for chest pain, orthopnea and leg swelling.   Gastrointestinal: Negative for abdominal pain, constipation, melena, nausea and vomiting.   Genitourinary: Negative for dysuria and hematuria.   Musculoskeletal: Negative for back pain, joint pain and myalgias.   Neurological: Negative for tingling, sensory change, focal weakness, weakness and headaches.   Psychiatric/Behavioral: Negative for depression and substance abuse. The patient is not nervous/anxious and does not have insomnia.    Remains on the ventilator    Vital Signs for last 24 hours   Pulse:  [51-90] 89  Resp:  [13-33] 19  BP: ()/(39-61) 110/56  SpO2:  [74 %-100 %] 98 %    Hemodynamic parameters for last 24 hours       Respiratory Information for the last 24 hours       Physical Exam   Physical Exam  Vitals signs and nursing note reviewed.   Constitutional:       Appearance: She is morbidly obese. She is not toxic-appearing or diaphoretic.      Interventions: She is sedated, intubated and  restrained.      Comments: Sleeping sitting upright, chronic ill appearing   HENT:      Head: Normocephalic and atraumatic.      Right Ear: External ear normal.      Left Ear: External ear normal.      Nose: No congestion or rhinorrhea.      Mouth/Throat:      Mouth: Mucous membranes are dry.      Pharynx: No oropharyngeal exudate or posterior oropharyngeal erythema.   Eyes:      General: No scleral icterus.     Extraocular Movements: Extraocular movements intact.      Pupils: Pupils are equal, round, and reactive to light.   Neck:      Musculoskeletal: Neck supple. No neck rigidity or muscular tenderness.      Vascular: No JVD.   Cardiovascular:      Rate and Rhythm: Normal rate and regular rhythm. Occasional extrasystoles are present.     Pulses: Normal pulses.      Heart sounds: Normal heart sounds. No murmur. No friction rub. No gallop.    Pulmonary:      Effort: Tachypnea present. No respiratory distress. She is intubated.      Breath sounds: Decreased breath sounds (Diffusely) and rhonchi present. No wheezing or rales.      Comments: Wet cough  Abdominal:      General: Abdomen is protuberant. There is no distension.      Palpations: Abdomen is soft. There is no mass.      Tenderness: There is no abdominal tenderness. There is no right CVA tenderness, left CVA tenderness or guarding. Negative signs include Charles's sign.      Comments: Cortrak   Musculoskeletal:         General: No swelling or tenderness.      Right lower leg: Edema (Improved) present.      Left lower leg: Edema (Improved) present.   Lymphadenopathy:      Cervical: No cervical adenopathy.   Skin:     General: Skin is warm and dry.      Capillary Refill: Capillary refill takes less than 2 seconds.      Coloration: Skin is not cyanotic, jaundiced or pale.      Findings: No bruising, erythema, lesion or rash.      Nails: There is no clubbing.     Neurological:      General: No focal deficit present.      Mental Status: She is oriented to person,  place, and time and easily aroused. She is lethargic.      GCS: GCS eye subscore is 4. GCS verbal subscore is 1. GCS motor subscore is 6.      Cranial Nerves: No cranial nerve deficit.      Sensory: No sensory deficit.      Motor: No weakness.      Deep Tendon Reflexes: Reflexes normal.      Comments: Awake answers appropriately, moves all extremities   Psychiatric:         Mood and Affect: Mood is anxious.         Behavior: Behavior is agitated. Behavior is cooperative.       Medications  Current Facility-Administered Medications   Medication Dose Route Frequency Provider Last Rate Last Dose   • ondansetron (ZOFRAN) syringe/vial injection 4 mg  4 mg Intravenous Q4HRS PRN Beatriz Navarro M.D.       • albuterol (PROVENTIL) 2.5mg/0.5ml nebulizer solution 2.5 mg  2.5 mg Nebulization 4X/DAY (RT) Beatriz Navarro M.D.   2.5 mg at 05/13/20 0632   • lactated ringers infusion (BOLUS)  500 mL Intravenous Once Marcos Gonzalez M.D.       • midodrine (PROAMATINE) tablet 10 mg  10 mg Enteral Tube Q8HRS Marcos Gonzalez M.D.   10 mg at 05/13/20 0532   • sodium chloride 3% nebulizer solution 3 mL  3 mL Nebulization 4X/DAY (RT) Marcos Gonzalez M.D.   3 mL at 05/13/20 0632   • guaiFENesin (ROBITUSSIN) 100 MG/5ML solution 200 mg  10 mL Enteral Tube Q4HRS Marcos Gonzalez M.D.   200 mg at 05/13/20 0533   • piperacillin-tazobactam (ZOSYN) 4.5 g in  mL IVPB  4.5 g Intravenous Q8HRS aMrcos Gonzalez M.D. 25 mL/hr at 05/13/20 0530 4.5 g at 05/13/20 0530   • norepinephrine (Levophed) infusion 8 mg/250 mL (premix)  0-30 mcg/min Intravenous Continuous Fernandez Latif M.D. 7.5 mL/hr at 05/13/20 0530 4 mcg/min at 05/13/20 0530   • hydrALAZINE (APRESOLINE) injection 20 mg  20 mg Intravenous Q6HRS PRN Nitish Lal Jr., D.O.       • senna-docusate (PERICOLACE or SENOKOT S) 8.6-50 MG per tablet 2 Tab  2 Tab Enteral Tube BID Nitish Lal Jr., D.O.   2 Tab at 05/13/20 0531    And   • polyethylene glycol/lytes (MIRALAX) PACKET 1  Packet  1 Packet Enteral Tube QDAY PRN Nitish Lal Jr. D.O.   1 Packet at 05/09/20 0522    And   • magnesium hydroxide (MILK OF MAGNESIA) suspension 30 mL  30 mL Enteral Tube QDAY PRN Nitish Lal Jr. D.O.   30 mL at 05/10/20 0726    And   • bisacodyl (DULCOLAX) suppository 10 mg  10 mg Rectal QDAY PRN Nitish Lal Jr. D.O.   10 mg at 05/11/20 0535   • MD Alert...ICU Electrolyte Replacement per Pharmacy   Other PHARMACY TO DOSE Nitish Lal Jr. D.O.       • oxyCODONE immediate-release (ROXICODONE) tablet 5 mg  5 mg Enteral Tube Q4HRS PRN Mahad Sherman M.D.       • apixaban (ELIQUIS) tablet 5 mg  5 mg Enteral Tube BID Nitish Dudley M.D.   5 mg at 05/13/20 0531   • amiodarone (CORDARONE) tablet 200 mg  200 mg Enteral Tube TWICE DAILY Nitish Dudley M.D.   200 mg at 05/13/20 0531   • Pharmacy Consult: Enteral tube insertion - review meds/change route/product selection  1 Each Other PHARMACY TO DOSE Vish Carrington M.D.       • acetaminophen (TYLENOL) tablet 650 mg  650 mg Enteral Tube Q6HRS PRN Vish Carrington M.D.   650 mg at 05/10/20 1812   • allopurinol (ZYLOPRIM) tablet 100 mg  100 mg Enteral Tube BID Vish Carrington M.D.   100 mg at 05/13/20 0532   • ascorbic acid tablet 500 mg  500 mg Enteral Tube DAILY Vish Carrington M.D.   500 mg at 05/13/20 0531   • cyclobenzaprine (FLEXERIL) tablet 10 mg  10 mg Enteral Tube TID PRN Vish Carrington M.D.       • Respiratory Therapy Consult   Nebulization Continuous RT Fernandez Latif M.D.       • ipratropium-albuterol (DUONEB) nebulizer solution  3 mL Nebulization Q2HRS PRN (RT) Fernandez Latif M.D.       • fentaNYL (SUBLIMAZE) injection 25 mcg  25 mcg Intravenous Q HOUR PRN Fernandez Latif M.D.   25 mcg at 05/07/20 0237       Fluids    Intake/Output Summary (Last 24 hours) at 5/13/2020 0644  Last data filed at 5/13/2020 0600  Gross per 24 hour   Intake 2517.16 ml   Output 1340 ml   Net 1177.16 ml       Laboratory  Recent Labs     05/12/20  1912  05/12/20  2108 05/13/20  0344   ISTATAPH 7.309* 7.288* 7.293*   ISTATAPCO2 58.6* 63.4* 61.6*   ISTATAPO2 87 83 99*   ISTATATCO2 31 32 32   QMMOUNY8UTH 95 94 97   ISTATARTHCO3 29.4* 30.4* 29.8*   ISTATARTBE 2 2 2   ISTATTEMP 37.1 C 99.0 F 99.3 F   ISTATFIO2 40 40 30   ISTATSPEC Arterial Arterial Arterial   ISTATAPHTC 7.308* 7.285* 7.288*   FTGKABAS2GG 87 84 101*         Recent Labs     05/12/20 0420 05/12/20 1652 05/13/20  0254   SODIUM 139 139 140   POTASSIUM 5.1 4.4 4.4   CHLORIDE 96 96 100   CO2 31 32 30   BUN 89* 94* 96*   CREATININE 1.33 1.42* 1.28   CALCIUM 9.5 9.1 8.8     Recent Labs     05/11/20  0530 05/11/20  1305 05/12/20 0420 05/12/20 1652 05/13/20  0254   ALTSGPT 38  --   --   --   --    ASTSGOT 31  --   --   --   --    ALKPHOSPHAT 63  --   --   --   --    TBILIRUBIN 0.7  --   --   --   --    PREALBUMIN  --  32.1  --   --   --    GLUCOSE 117*  --  138* 156* 134*     Recent Labs     05/11/20 0530 05/12/20 0420 05/12/20 1652 05/13/20  0254   WBC 15.9* 21.3* 21.3* 19.7*   NEUTSPOLYS 69.00 85.30*  --  80.30*   LYMPHOCYTES 18.90* 7.00*  --  7.00*   MONOCYTES 10.60 6.20  --  8.80   EOSINOPHILS 0.10 0.00  --  1.50   BASOPHILS 0.10 0.20  --  0.20   ASTSGOT 31  --   --   --    ALTSGPT 38  --   --   --    ALKPHOSPHAT 63  --   --   --    TBILIRUBIN 0.7  --   --   --      Recent Labs     05/12/20  0420 05/12/20  1652 05/13/20  0254   RBC 5.00 4.94 4.70   HEMOGLOBIN 13.0 12.9 12.3   HEMATOCRIT 46.1 44.7 41.9   PLATELETCT 219 248 221       Imaging  X-Ray:  I have personally reviewed the images and compared with prior images. and My impression is: right lower lobe infiltrate vs atlectasis     Echo 5/2  CONCLUSIONS  No prior study is available for comparison.   Normal left ventricular chamber size.  Left ventricular ejection fraction is visually estimated to be greater   than 75%.  Severely dilated right ventricle.  Reduced right ventricular systolic function.  Mild aortic stenosis.  Estimated right ventricular  systolic pressure  is 70 mmHg    Assessment/Plan  * Acute respiratory failure with hypoxia and hypercapnia (HCC)  Assessment & Plan  Initially intubated 5/, extubation 5/6, failed with stridor and reintubated 5/7, extubated 5/10  Right heart failure and sepsis/pneumonia  Suspected covid initially, PCR for COVID negative x3 now, isolation discontinued  CTA negative for PE, left lower lobe atelectasis/pneumonia suspected, diffuse viral process not seen  Transitioned heparin drip to Xarelto for atrial flutter    Patient with mild hypoxemia monitor closely for need for HFNC or bipap    Placed on Bipap for worsening hypercarbic respiratory failure with serial monitor ABG      Pulmonary hypertension (HCC)  Assessment & Plan  RVSP 70  Significant dilation left atrium  BMI 40 query component of PREMA/OHS will need outpatient sleep study and pulmonary and cardiology followup  Suspect chronic hypoxemia  CTA negative for PE 5/4  No clear valvular abnormalities beyond mild left ear on echo with preserved LV function  New onset atrial flutter 5/4, recurrent 5/6-7  Continue apixaban      Atrial flutter (HCC)  Assessment & Plan  intermittently with RVR, rate controlled except for with exertion  New onset 5/4  Recurrent 5/6-7 with recurrent respiratory failure and reintubation, complicated by requirement of norepinephrine infusion  Echo with severe pulmonary pretension and mild AS  CTA with no evidence of pulmonary embolus but dilated left atrium  Continue to optimize electrolytes  Continue apixaban and amiodarone enterally  Repeat elective cardioversion successful 5/9  Remains in sinus rhythm, contnued on amiodarone    Septic shock (HCC)  Assessment & Plan  This is Septic shock Present on admission  SIRS criteria identified on my evaluation include: Tachycardia, with heart rate greater than 90 BPM, Tachypnea, with respirations greater than 20 per minute and Leukocytosis, with WBC greater than 12,000  Source is likely pneumonia, +  diarrhea,  covid ruled out with 3 serial negative studies  Presentation includes: hypervolemic with heart failure, unable to give sepsis bolus  \the patient remains hypotensive with systolic blood pressure less than 90 or MAP less than 65  Hemodynamic support with additional fluids and IV vasopressors as needed to maintain a SBP of 90 or MAP of 65  IV antibiotics as appropriate for source of sepsis  Reassessment: I have reassessed the patient's hemodynamic status    Status post antibiotics  Monitor closely need for restarting ->started zosyn 5/12   Cortisol normal, lactate normal on midodrine         Acute on chronic diastolic heart failure (HCC)  Assessment & Plan  Mainly right heart failure RVSP 70 with reduced function will need sleep study, CTA chest negative for pe  LVH and mild AS  Dry fluid volumes when able  Currently on pressors for RV perfusion  Trend CVP and right sided pressure  Dry fluid volume as tolerate      Pneumonia due to infectious organism  Assessment & Plan  Purulent secretions on intubation  Possibly from aspiration or cap  Antibiotics- cefepime and doxycycline with last day of treatment 5/6  COVID PCR negative daily x3 days-CT scan does not reveal diffuse viral pneumonic process  Cultures negative so far, no sputum culture ever sent?  WBC increased after initiation Decadron, monitor for HCAP    Aortic stenosis  Assessment & Plan  mild    Acute renal failure (ARF) ([facility])  Assessment & Plan  Likely from poor perfusion from sepsis/cardiac dysfunction  BUN increasing and creatinine close to normal ? Steroids vs pre-renal from left sided   Avoid nephrotoxins as able clinically  Renally dose medications were clinically appropriate  Monitor urine output and serial BMP  Monitoring renal function      Essential hypertension  Assessment & Plan  Keep SBP < 160 home medication include cozaar and metoprolol  Currently on norepinephrine monitor need for restarting      Prolonged Q-T interval on  ECG  Assessment & Plan  QTc 501  Avoid QT prolonging agents  Optimize electrolytes  Cardiac monitoring, repeat EKG as needed    BMI 38.0-38.9,adult- (present on admission)  Assessment & Plan  Outpatient weight loss  Nutrition consultation    Hypokalemia  Assessment & Plan  Replete, goal greater than 4.0, ERP  Also replete magnesium to greater than 2.0    Hypophosphatemia  Assessment & Plan  Replete, goal greater than 2.5, ERP    Acute encephalopathy  Assessment & Plan  Primarily related to medications, improved, agitation more of an issue now  multifactorial etiology  Limit sedative improving  Delirium like precautions    Diarrhea  Assessment & Plan  Unclear etiology, doubt COVID, improved  Does not seem to have been treated with ABX prior  No bowel movement for the first 4 days, episode of diarrhea 5/5 and stool for C. difficile results negative       VTE:  Heparin  Ulcer: H2 Antagonist  Lines: Central Line  Ongoing indication addressed    I have performed a physical exam and reviewed and updated ROS and Plan today (5/13/2020). In review of yesterday's note (5/12/2020), there are no changes except as documented above.     Discussed patient condition and risk of morbidity and/or mortality with RN, RT, Pharmacy, Charge nurse / hot rounds, Patient and cardiology     Patient remains in critical condition from shock on norepinephrine with active titration and placed on Bipap serial monitoring of hemodynamic lactate and close monitoring for need for re-intubation with worsening hypoxemia. Critical care time provided was 135 minutes. This excludes all separate billable procedures.

## 2020-05-12 NOTE — CARE PLAN
Problem: Respiratory:  Goal: Respiratory status will improve  Outcome: PROGRESSING AS EXPECTED  Note: Patient extubated today at 1050, tolerated well, on 4L NC     Problem: Mobility  Goal: Risk for activity intolerance will decrease  Outcome: PROGRESSING SLOWER THAN EXPECTED  Note: Patient refused to mobilize with RN and PT today, educated on importance of mobilization

## 2020-05-12 NOTE — THERAPY
"Physical Therapy Treatment completed.   Bed Mobility:  Supine to Sit: Minimal Assist  Transfers: Sit to Stand: Minimal Assist  Gait: Level Of Assist: SPT from EOB <> commode with FWW, shuffled steps, min A for safety.   Plan of Care: Will benefit from Physical Therapy 3 times per week  Discharge Recommendations: Equipment: Will Continue to Assess for Equipment Needs. Recommend post-acute placement for continued physical therapy services prior to discharge home.       See \"Rehab Therapy-Acute\" Patient Summary Report for complete documentation.     Pt with significant improvement this session. Pt was extubated yesterday. Pt pleasant and agreeable to PT session. Requires some encouragement to try moving on her own prior to receiving assist. Pt completed supine <> sit, STS, and SPT to commode with FWW with min A for safety. Able to take shuffled steps to transfer to/from commode. Session limited by fatigue and slight incr in SOB. Will continue to work with pt in acute setting. At this time recommend postacute placement.  "

## 2020-05-12 NOTE — CARE PLAN
Problem: Respiratory:  Goal: Respiratory status will improve  Note: Patient in now on 15 L oxy mask at this time. RT involved.      Problem: Mobility  Goal: Risk for activity intolerance will decrease  Note: PT/OT involved. Patient refusing mobility, educated patient on importance of mobility. Patient agrees to mobilize with PT/OT today.

## 2020-05-12 NOTE — PROGRESS NOTES
MS: .20/.18/.36    SR/ST 80s to 110s, PVCs occasional    12 hr chart check complete.     Gave report to RN for pt transfer to [facility]

## 2020-05-12 NOTE — RESPIRATORY CARE
Adult Noninvasive Ventilation    BiPap Day 1  IPAP (cmH2O): 16   EPAP (cm H2O): 8   FiO2: 40     I-Gel in place for skin protection, pt able to demonstrate removal of mask.    Events/Summary/Plan: ABG drawn, MD notified. Placed pt on BiPAP per MD. (05/12/20 9519)

## 2020-05-12 NOTE — CARE PLAN
Problem: Safety  Goal: Will remain free from injury  Outcome: PROGRESSING AS EXPECTED  Goal: Will remain free from falls  Outcome: PROGRESSING AS EXPECTED     Problem: Respiratory:  Goal: Respiratory status will improve  Outcome: PROGRESSING AS EXPECTED     Problem: Pain Management  Goal: Pain level will decrease to patient's comfort goal  Outcome: PROGRESSING AS EXPECTED     Problem: Skin Integrity  Goal: Risk for impaired skin integrity will decrease  Outcome: PROGRESSING AS EXPECTED     Problem: Mobility  Goal: Risk for activity intolerance will decrease  Outcome: PROGRESSING AS EXPECTED     Problem: Communication  Goal: The ability to communicate needs accurately and effectively will improve  Outcome: PROGRESSING SLOWER THAN EXPECTED

## 2020-05-12 NOTE — PROGRESS NOTES
Patient has become increasingly lethargic throughout afternoon. Blood gas obtained per orders. Dr. Gonzalez notified of results. New orders to place patient on bipap at this time and set up for intubation. Orders received and carried out.

## 2020-05-12 NOTE — CARE PLAN
Problem: Oxygenation:  Goal: Maintain adequate oxygenation dependent on patient condition  Outcome: PROGRESSING AS EXPECTED     Problem: Ventilation Defect:  Goal: Ability to achieve and maintain unassisted ventilation or tolerate decreased levels of ventilator support  Outcome: NOT MET     Problem: Hyperinflation:  Goal: Prevent or improve atelectasis  Outcome: PROGRESSING SLOWER THAN EXPECTED   IS QID, achieving 500 ml. 60% of predicted value is 1050 ml.  Patient receiving 2 LPM n/c

## 2020-05-13 NOTE — THERAPY
"Occupational Therapy Treatment completed with focus on ADLs and ADL transfers.  Functional Status:    Pt seen for OT treatment this morning, doing better at this time as compared to performance at [facility]. She completed bed mobility with Min A, STS Min A, Max A LB dressing (socks), though essentially refused to attempt. Min A to pivot to BSC and back to EOB. Pt is slightly self limiting, though does well with encouragement. Will continue to follow.     Plan of Care: Will benefit from Occupational Therapy 3 times per week  Discharge Recommendations:  Equipment Will Continue to Assess for Equipment Needs. Post-acute therapy Recommend post-acute placement for additional occupational therapy services prior to discharge home.      See \"Rehab Therapy-Acute\" Patient Summary Report for complete documentation.   "

## 2020-05-13 NOTE — CARE PLAN
Problem: Respiratory:  Goal: Respiratory status will improve  Intervention: Educate and encourage coughing and deep breathing  Note: Pt encouraged to mobilize secretions. Strong productive cough, suctioned as needed     Problem: Mobility  Goal: Risk for activity intolerance will decrease  Intervention: Encourage patient to increase activity level in collaboration with Interdisciplinary Team  Note: Pt to bed side commode x2 assist.

## 2020-05-13 NOTE — PROGRESS NOTES
"Critical Care Progress Note    Date of admission  5/1/2020    Chief Complaint  80 y.o. female admitted 5/1/2020 with increasing dyspnea, CHF versus pneumonia, severe pulmonary hypertension, intubated 5/1    Hospital Course    \"80 y.o. female who presented 5/1/2020 with respiratory failure with hypoxia.  She has a hx of hypertension, chf, aortic stenosis and was initially admitted to medical floor.  Per admission note 5 days of sob.  Worse when she lays down.  Diarrhea for 3 days.  No abdomianl pain.  Recently started on lasix outpatient due to lower ext edema and weight gain.  Transferred from [facility].  Labs were significant for renal failure with cr 2.6, troponin 0.08, wbc 12.  She has possible pneumonia on chest xray.  Covid suspected and negative test on admission.  Respiratory failure and transferred to ICU.  She had hypercapnic respiratory failure with hypoxia.  Lethargic. Intubated.  There were purulent secretions on intubation upper airway.  Bedside us showed enlarged right heart, I started on heparin drip. Avoid contrast exam for now given suspected covid of which may be reason for diarrhea.  Weaning o2 on ventilator after needing high peep. Levophed for shock.  Treating for pneumonia.  Given 500 ml IVF after intubation.  She is becoming hypervolemic at this time.  Added vasopressin.  Ongoing agitation despite propofol.  Required arterial line and central line.  No pericardial effusion, adequate LVEF at least moderate to normal function.  Started antibiotics.  Blood cx pending.  Moves all extremities and no focal deficits.\" -Dr Latif 5/1         Interval Problem Update  Reviewed last 24 hour events:  Neuro:AOX4 moves all ext    HR: 70-80's snr  SBP: levo 2 's  Tmax: afebrile  GI: TF at goal small bm last night   UOP: 940ml overnight  Lines: central line, art line  Resp: bipap 16/8 30% refusing this am was lethargic replaced lots of secretion   Vte: eliquis  PPI/H2:n/a  Antibx: " zosyn  mrsa pending  norepi 2   midodrine    LR bolus 500ml  Discussed code status changed to DNR DNI  Will get palliative care consult    Later called to bedside patient refusing oxygen to sat's 60%  Came to bedside patient is awake described she is done fighting she is okay if we call her friend Sabina. She want to focus on her comfort and treat her pain an anxiety and any symptoms that developed.   Will place on comfort focused care per her request.       Review of Systems  Review of Systems   Constitutional: Positive for malaise/fatigue. Negative for fever.   HENT: Positive for sore throat. Negative for congestion, ear pain and hearing loss.    Eyes: Negative for blurred vision and pain.   Respiratory: Positive for cough and sputum production. Negative for shortness of breath, wheezing and stridor.    Cardiovascular: Negative for chest pain, orthopnea and leg swelling.   Gastrointestinal: Negative for abdominal pain, constipation, melena, nausea and vomiting.   Genitourinary: Negative for dysuria and hematuria.   Musculoskeletal: Negative for back pain, joint pain and myalgias.   Neurological: Negative for tingling, sensory change, focal weakness, weakness and headaches.   Psychiatric/Behavioral: Negative for depression and substance abuse. The patient is not nervous/anxious and does not have insomnia.    Remains on the ventilator    Vital Signs for last 24 hours   Pulse:  [51-92] 92  Resp:  [13-40] 38  BP: ()/(39-80) 73/44  SpO2:  [36 %-100 %] 65 %    Hemodynamic parameters for last 24 hours       Respiratory Information for the last 24 hours       Physical Exam   Physical Exam  Vitals signs and nursing note reviewed.   Constitutional:       Appearance: She is morbidly obese. She is not toxic-appearing or diaphoretic.      Interventions: She is sedated, intubated and restrained.      Comments: Sitting up in bed no acute distress, chronic ill appearing   HENT:      Head: Normocephalic and atraumatic.       Right Ear: External ear normal.      Left Ear: External ear normal.      Nose: No congestion or rhinorrhea.      Mouth/Throat:      Mouth: Mucous membranes are dry.      Pharynx: No oropharyngeal exudate or posterior oropharyngeal erythema.   Eyes:      General: No scleral icterus.     Extraocular Movements: Extraocular movements intact.      Pupils: Pupils are equal, round, and reactive to light.   Neck:      Musculoskeletal: Neck supple. No neck rigidity or muscular tenderness.      Vascular: No JVD.   Cardiovascular:      Rate and Rhythm: Normal rate and regular rhythm. Occasional extrasystoles are present.     Pulses: Normal pulses.      Heart sounds: Normal heart sounds. No murmur. No friction rub. No gallop.    Pulmonary:      Effort: Tachypnea present. No respiratory distress. She is intubated.      Breath sounds: Decreased breath sounds (Diffusely) and rhonchi present. No wheezing or rales.      Comments: Wet cough  Abdominal:      General: Abdomen is protuberant. There is no distension.      Palpations: Abdomen is soft. There is no mass.      Tenderness: There is no abdominal tenderness. There is no right CVA tenderness, left CVA tenderness or guarding. Negative signs include Charles's sign.      Comments: Cortrak   Musculoskeletal:         General: No swelling or tenderness.      Right lower leg: Edema (Improved) present.      Left lower leg: Edema (Improved) present.   Lymphadenopathy:      Cervical: No cervical adenopathy.   Skin:     General: Skin is warm and dry.      Capillary Refill: Capillary refill takes less than 2 seconds.      Coloration: Skin is not cyanotic, jaundiced or pale.      Findings: No bruising, erythema, lesion or rash.      Nails: There is no clubbing.     Neurological:      General: No focal deficit present.      Mental Status: She is oriented to person, place, and time and easily aroused. She is lethargic.      GCS: GCS eye subscore is 4. GCS verbal subscore is 1. GCS motor  subscore is 6.      Cranial Nerves: No cranial nerve deficit.      Sensory: No sensory deficit.      Motor: No weakness.      Deep Tendon Reflexes: Reflexes normal.      Comments: Awake answers appropriately, moves all extremities, no confusion   Psychiatric:         Mood and Affect: Mood is anxious.         Behavior: Behavior is agitated. Behavior is cooperative.       Medications  Current Facility-Administered Medications   Medication Dose Route Frequency Provider Last Rate Last Dose   • ondansetron (ZOFRAN) syringe/vial injection 4 mg  4 mg Intravenous Q4HRS PRN Beatriz Navarro M.D.       • albuterol (PROVENTIL) 2.5mg/0.5ml nebulizer solution 2.5 mg  2.5 mg Nebulization 4X/DAY (RT) Beatriz Navarro M.D.   2.5 mg at 05/13/20 1007   • midodrine (PROAMATINE) tablet 10 mg  10 mg Enteral Tube Q8HRS Marcos Gonzalez M.D.   10 mg at 05/13/20 1323   • sodium chloride 3% nebulizer solution 3 mL  3 mL Nebulization 4X/DAY (RT) Marcos Gonzalez M.D.   3 mL at 05/13/20 1007   • guaiFENesin (ROBITUSSIN) 100 MG/5ML solution 200 mg  10 mL Enteral Tube Q4HRS Marcos Gonzalez M.D.   200 mg at 05/13/20 1327   • piperacillin-tazobactam (ZOSYN) 4.5 g in  mL IVPB  4.5 g Intravenous Q8HRS Marcos Gonzalez M.D. 25 mL/hr at 05/13/20 1323 4.5 g at 05/13/20 1323   • norepinephrine (Levophed) infusion 8 mg/250 mL (premix)  0-30 mcg/min Intravenous Continuous Fernandez Latif M.D. 9.4 mL/hr at 05/13/20 1519 5 mcg/min at 05/13/20 1519   • hydrALAZINE (APRESOLINE) injection 20 mg  20 mg Intravenous Q6HRS PRN Nitish Lal Jr., D.O.       • senna-docusate (PERICOLACE or SENOKOT S) 8.6-50 MG per tablet 2 Tab  2 Tab Enteral Tube BID Nitish Lal Jr., D.O.   2 Tab at 05/13/20 0531    And   • polyethylene glycol/lytes (MIRALAX) PACKET 1 Packet  1 Packet Enteral Tube QDAY PRN Nitish Lal Jr., D.O.   1 Packet at 05/13/20 1020    And   • magnesium hydroxide (MILK OF MAGNESIA) suspension 30 mL  30 mL Enteral Tube QDAY PRN Nitish STOCK  Hali Lim D.O.   30 mL at 05/10/20 0726    And   • bisacodyl (DULCOLAX) suppository 10 mg  10 mg Rectal QDAY PRN Nitish Lal Jr. D.OGina   10 mg at 05/11/20 0535   • MD Alert...ICU Electrolyte Replacement per Pharmacy   Other PHARMACY TO DOSE TIGRE Champagne Jr..IZABEL       • oxyCODONE immediate-release (ROXICODONE) tablet 5 mg  5 mg Enteral Tube Q4HRS PRN Mahad Sherman M.D.       • apixaban (ELIQUIS) tablet 5 mg  5 mg Enteral Tube BID Nitish Dudley M.D.   5 mg at 05/13/20 0531   • amiodarone (CORDARONE) tablet 200 mg  200 mg Enteral Tube TWICE DAILY Nitish Dudley M.D.   200 mg at 05/13/20 0531   • Pharmacy Consult: Enteral tube insertion - review meds/change route/product selection  1 Each Other PHARMACY TO DOSE Vish Carrington M.D.       • acetaminophen (TYLENOL) tablet 650 mg  650 mg Enteral Tube Q6HRS PRN Vish Carrington M.D.   650 mg at 05/10/20 1812   • allopurinol (ZYLOPRIM) tablet 100 mg  100 mg Enteral Tube BID Vish Carrington M.D.   100 mg at 05/13/20 0532   • ascorbic acid tablet 500 mg  500 mg Enteral Tube DAILY Vish Carrington M.D.   500 mg at 05/13/20 0531   • cyclobenzaprine (FLEXERIL) tablet 10 mg  10 mg Enteral Tube TID PRN Vish Carrington M.D.       • Respiratory Therapy Consult   Nebulization Continuous RT Fernandez Latif M.D.       • ipratropium-albuterol (DUONEB) nebulizer solution  3 mL Nebulization Q2HRS PRN (RT) Fernandez Latif M.D.       • fentaNYL (SUBLIMAZE) injection 25 mcg  25 mcg Intravenous Q HOUR PRN Fernandez Latif M.D.   25 mcg at 05/07/20 0237       Fluids    Intake/Output Summary (Last 24 hours) at 5/13/2020 1601  Last data filed at 5/13/2020 1400  Gross per 24 hour   Intake 2981.14 ml   Output 1700 ml   Net 1281.14 ml       Laboratory  Recent Labs     05/12/20  1912 05/12/20  2108 05/13/20  0344   ISTATAPH 7.309* 7.288* 7.293*   ISTATAPCO2 58.6* 63.4* 61.6*   ISTATAPO2 87 83 99*   ISTATATCO2 31 32 32   [id number] 95 94 97   ISTATARTHCO3 29.4* 30.4* 29.8*   ISTATARTBE  2 2 2   ISTATTEMP 37.1 C 99.0 F 99.3 F   ISTATFIO2 40 40 30   ISTATSPEC Arterial Arterial Arterial   ISTATAPHTC 7.308* 7.285* 7.288*   [id number] 87 84 101*         Recent Labs     05/12/20 0420 05/12/20 1652 05/13/20  0254   SODIUM 139 139 140   POTASSIUM 5.1 4.4 4.4   CHLORIDE 96 96 100   CO2 31 32 30   BUN 89* 94* 96*   CREATININE 1.33 1.42* 1.28   CALCIUM 9.5 9.1 8.8     Recent Labs     05/11/20 0530 05/11/20  1305 05/12/20 0420 05/12/20 1652 05/13/20  0254   ALTSGPT 38  --   --   --   --    ASTSGOT 31  --   --   --   --    ALKPHOSPHAT 63  --   --   --   --    TBILIRUBIN 0.7  --   --   --   --    PREALBUMIN  --  32.1  --   --   --    GLUCOSE 117*  --  138* 156* 134*     Recent Labs     05/11/20 0530 05/12/20 0420 05/12/20 1652 05/13/20  0254   WBC 15.9* 21.3* 21.3* 19.7*   NEUTSPOLYS 69.00 85.30*  --  80.30*   LYMPHOCYTES 18.90* 7.00*  --  7.00*   MONOCYTES 10.60 6.20  --  8.80   EOSINOPHILS 0.10 0.00  --  1.50   BASOPHILS 0.10 0.20  --  0.20   ASTSGOT 31  --   --   --    ALTSGPT 38  --   --   --    ALKPHOSPHAT 63  --   --   --    TBILIRUBIN 0.7  --   --   --      Recent Labs     05/12/20 0420 05/12/20 1652 05/13/20  0254   RBC 5.00 4.94 4.70   HEMOGLOBIN 13.0 12.9 12.3   HEMATOCRIT 46.1 44.7 41.9   PLATELETCT 219 248 221       Imaging  X-Ray:  I have personally reviewed the images and compared with prior images. and My impression is: still with right lower lobe infiltrate unchanged     Echo 5/2  CONCLUSIONS  No prior study is available for comparison.   Normal left ventricular chamber size.  Left ventricular ejection fraction is visually estimated to be greater   than 75%.  Severely dilated right ventricle.  Reduced right ventricular systolic function.  Mild aortic stenosis.  Estimated right ventricular systolic pressure  is 70 mmHg    Assessment/Plan  * Acute respiratory failure with hypoxia and hypercapnia (HCC)  Assessment & Plan  Initially intubated 5/, extubation 5/6, failed with stridor and  reintubated 5/7, extubated 5/10  Right heart failure and sepsis/pneumonia  Suspected covid initially, PCR for COVID negative x3 now, isolation discontinued  CTA negative for PE, left lower lobe atelectasis/pneumonia suspected, diffuse viral process not seen  Transitioned heparin drip to Xarelto for atrial flutter    Patient with mild hypoxemia monitor closely for need for HFNC or bipap    Placed on Bipap for worsening hypercarbic respiratory failure with serial monitor ABG      Pulmonary hypertension (HCC)  Assessment & Plan  RVSP 70  Significant dilation left atrium  BMI 40 query component of PREMA/OHS will need outpatient sleep study and pulmonary and cardiology followup  Suspect chronic hypoxemia  CTA negative for PE 5/4  No clear valvular abnormalities beyond mild left ear on echo with preserved LV function  New onset atrial flutter 5/4, recurrent 5/6-7  Continue apixaban      Atrial flutter (HCC)  Assessment & Plan  intermittently with RVR, rate controlled except for with exertion  New onset 5/4  Recurrent 5/6-7 with recurrent respiratory failure and reintubation, complicated by requirement of norepinephrine infusion  Echo with severe pulmonary pretension and mild AS  CTA with no evidence of pulmonary embolus but dilated left atrium  Continue to optimize electrolytes  Continue apixaban and amiodarone enterally  Repeat elective cardioversion successful 5/9  Remains in sinus rhythm, contnued on amiodarone    Septic shock (HCC)  Assessment & Plan  This is Septic shock Present on admission  SIRS criteria identified on my evaluation include: Tachycardia, with heart rate greater than 90 BPM, Tachypnea, with respirations greater than 20 per minute and Leukocytosis, with WBC greater than 12,000  Source is likely pneumonia, + diarrhea,  covid ruled out with 3 serial negative studies  Presentation includes: hypervolemic with heart failure, unable to give sepsis bolus  \the patient remains hypotensive with systolic blood  pressure less than 90 or MAP less than 65  Hemodynamic support with additional fluids and IV vasopressors as needed to maintain a SBP of 90 or MAP of 65  IV antibiotics as appropriate for source of sepsis  Reassessment: I have reassessed the patient's hemodynamic status    Status post antibiotics  Monitor closely need for restarting ->started zosyn 5/12   Cortisol normal, lactate normal on midodrine         Acute on chronic diastolic heart failure (HCC)  Assessment & Plan  Mainly right heart failure RVSP 70 with reduced function will need sleep study, CTA chest negative for pe  LVH and mild AS  Dry fluid volumes when able  Currently on pressors for RV perfusion  Trend CVP and right sided pressure  Dry fluid volume as tolerate      Pneumonia due to infectious organism  Assessment & Plan  Purulent secretions on intubation  Possibly from aspiration or cap  Antibiotics- cefepime and doxycycline with last day of treatment 5/6  COVID PCR negative daily x3 days-CT scan does not reveal diffuse viral pneumonic process  Cultures negative so far, no sputum culture ever sent?  WBC increased after initiation Decadron, monitor for HCAP    Aortic stenosis  Assessment & Plan  mild    Acute renal failure (ARF) (HCC)  Assessment & Plan  Likely from poor perfusion from sepsis/cardiac dysfunction  BUN increasing and creatinine close to normal ? Steroids vs pre-renal from left sided   Avoid nephrotoxins as able clinically  Renally dose medications were clinically appropriate  Monitor urine output and serial BMP  Monitoring renal function      Essential hypertension  Assessment & Plan  Keep SBP < 160 home medication include cozaar and metoprolol  Currently on norepinephrine monitor need for restarting      Prolonged Q-T interval on ECG  Assessment & Plan  QTc 501  Avoid QT prolonging agents  Optimize electrolytes  Cardiac monitoring, repeat EKG as needed    BMI 38.0-38.9,adult- (present on admission)  Assessment & Plan  Outpatient weight  loss  Nutrition consultation    Hypokalemia  Assessment & Plan  Replete, goal greater than 4.0, ERP  Also replete magnesium to greater than 2.0    Hypophosphatemia  Assessment & Plan  Replete, goal greater than 2.5, ERP    Acute encephalopathy  Assessment & Plan  Primarily related to medications, improved, agitation more of an issue now  multifactorial etiology  Limit sedative improving  Delirium like precautions    Diarrhea  Assessment & Plan  Unclear etiology, doubt COVID, improved  Does not seem to have been treated with ABX prior  No bowel movement for the first 4 days, episode of diarrhea 5/5 and stool for C. difficile results negative       VTE:  Heparin  Ulcer: H2 Antagonist  Lines: Central Line  Ongoing indication addressed    I have performed a physical exam and reviewed and updated ROS and Plan today (5/13/2020). In review of yesterday's note (5/12/2020), there are no changes except as documented above.     Discussed patient condition and risk of morbidity and/or mortality with RN, RT, Pharmacy, Charge nurse / hot rounds, Patient and cardiology     Patient remains in critical condition from severe right heart failure and shock and hypoxia needing bipap and titration of norepinephrine gtt with active titration. Critical care time provided was 81 minutes. This excludes all separate billable procedures.

## 2020-05-13 NOTE — THERAPY
Missed Therapy     Patient Name: Grace Leavitt  Age:  80 y.o., Sex:  female  Medical Record #: [id number]  Today's Date: 5/13/2020    Discussed missed therapy with RN. Pt is on BiPAP and not appropriate for a clinical swallow evaluation.        05/13/20 1025   Treatment Variance   Reason For Missed Therapy Medical - Other (Please Comment)  (Pt on BiPAP)   Total Time Spent   Total Time Spent (Mins) 2

## 2020-05-13 NOTE — CARE PLAN
Problem: Oxygenation:  Goal: Maintain adequate oxygenation dependent on patient condition  Outcome: PROGRESSING AS EXPECTED     Problem: Bronchoconstriction:  Goal: Improve in air movement and diminished wheezing  Outcome: PROGRESSING AS EXPECTED     Problem: Ventilation Defect:  Goal: Ability to achieve and maintain unassisted ventilation or tolerate decreased levels of ventilator support  Outcome: NOT MET     Problem: Hyperinflation:  Goal: Prevent or improve atelectasis  Outcome: NOT MET   BiPAP 16/8, 30% FIO2  BiPAP used for most of the night with short breaks. Patient eventually refused due to discomfort. MD aware.   Patient receiving 6 LPM via oxymask. Strong cough, secretion clearance with assistance.   3% QID  Achieving 500-750 on IS

## 2020-05-13 NOTE — PROGRESS NOTES
"Pt refusing to wear oxygen.  Pt sats in the 70s, educate pt that if she does not wear her oxygen she could pass way.  Pt stated \"I am aware of this, and let's not go through it all again.\"  "

## 2020-05-14 PROBLEM — Z51.5 COMFORT MEASURES ONLY STATUS: Status: ACTIVE | Noted: 2020-01-01

## 2020-05-14 NOTE — DIETARY
Nutrition Services:  RD previously following patient. Patient now transitioned to comfort care. Re-consult RD as needed.     RD available prn.

## 2020-05-14 NOTE — THERAPY
Pt has transitioned to comfort care. PT to sign off at this time. Please reorder should pt status change. Thank you    Jennifer Sanchez, PT, DPT Pager: [phone]

## 2020-05-14 NOTE — PROGRESS NOTES
Pt is comfort care (DNR/DNI) on room air only satting in the 60s. Pt aware and told MD she was done fighting. Pt friend Sabina at bedside and notified she will have to leave at 9pm per [facility] visiting hours

## 2020-05-14 NOTE — PROGRESS NOTES
0945 admitted female pt from [facility] ICU via hospital bed. A/Ox3. Reoriented to time. Not in any distress but RA sats 78%. Refuse oxygen. Denies pain. Oriented to room, bed and call system. Bed in low position, call light w/in reach. Will continue to monitor comfort and safety.

## 2020-05-14 NOTE — DISCHARGE PLANNING
· RNCM received call from Pt's friend, Sabina Walker, regarding questions on advanced care planning documents for the Pt  · RNCM updated Sabina that there are no documents on file at this time  · Sabina inquiring about how to complete an AD or will   · RNCM updated Sabina that unfortunately due to visitor restrictions a new AD cannot be completed, as it would not get notarized   · Sabina verbalized understanding   · Sabina updated of Pt's room change

## 2020-05-14 NOTE — PROGRESS NOTES
1225: Notified pts best friend and primary point of contact Sabina to let her know pt has been moved to room 146 in Neurosurgery. Gave update on no visitation at this time per hospital policy unless patients condition were to worsen. She was appreciative of update.

## 2020-05-14 NOTE — THERAPY
Missed Therapy     Patient Name: Grace Leavitt  Age:  80 y.o., Sex:  female  Medical Record #: [id number]  Today's Date: 5/14/2020    Discussed missed therapy with RN. Patient now comfort care. SLP services not indicated. Please re-consult if appropriate. Thank you.        05/14/20 1508   Treatment Variance   Reason For Missed Therapy Medical - Other (Please Comment)  (Now on comfort care measures)

## 2020-05-14 NOTE — PROGRESS NOTES
[facility] Medicine Daily Progress Note    Date of Service  5/14/2020    Chief Complaint  80 y.o. female admitted 5/1/2020 with initial admission with increasing dyspnea, congestive heart failure, pulmonary pretension, was initially evaluated and required intubation, the patient initially also found with renal failure, stenosis, but amount of atrial fib flutter which required cardioversion, the patient was treated for suspected pneumonia, she remained hemodynamically unstable with need for on and off fluid pushes, pressors were used, on 5/14 the patient apparently started to take off her oxygen device that voiced that she would not want to be further aggressively treated.  Patient refused oxygen despite D satting down into the 60% range, the patient's CODE STATUS was changed to DNR/DNI, Dr. Knox from the intensive care service reevaluated the patient and the decision was reached at the patient should be treated for comfort only, her status was changed to comfort focused care on 5/14 the patient is transferred to the medical unit for now.  The patient is off oxygen, hypoxic, she is awake but lethargic and confused.  Currently she does not appear to be in distress or suffering    Hospital Course    Patient admitted with severe pulmonary pretension, atrial fibrillation, respiratory failure, treated aggressively and after progressive failure and the patient's refusal of care transferred to a with comfort care status only      Interval Problem Update  Patient seen and examined today.    Patient tolerating treatment and therapies.  All Data, Medication data reviewed.  Case discussed with nursing as available.  Plan of Care reviewed with patient and notified of changes.  5/14 the patient is seen on the medical unit, she denies current chest pain, no respiratory distress, the patient appears hypoxic and confused, comfort care orders are in place    Consultants/Specialty  Critical care/pulmonary  Cardiology  Code  Status  DNR/DNI/comfort care    Disposition  TBD    Review of Systems  Review of Systems   Unable to perform ROS: Mental acuity        Physical Exam  Temp:  [36.1 °C (97 °F)-37.6 °C (99.7 °F)] 36.1 °C (97 °F)  Pulse:  [] 102  Resp:  [21-36] 21  BP: (105-118)/(52-68) 118/52  SpO2:  [50 %-72 %] 72 %    Physical Exam  Vitals signs and nursing note reviewed.   Constitutional:       Appearance: She is well-developed. She is ill-appearing. She is not diaphoretic.   HENT:      Head: Normocephalic and atraumatic.      Nose: Nose normal.   Eyes:      Conjunctiva/sclera: Conjunctivae normal.      Pupils: Pupils are equal, round, and reactive to light.   Neck:      Musculoskeletal: Normal range of motion and neck supple.      Thyroid: No thyromegaly.      Vascular: No JVD.   Cardiovascular:      Rate and Rhythm: Tachycardia present.      Heart sounds: Normal heart sounds. No friction rub. No gallop.    Pulmonary:      Effort: Pulmonary effort is normal.      Breath sounds: Rhonchi and rales present.   Abdominal:      General: Bowel sounds are normal. There is no distension.      Palpations: Abdomen is soft. There is no mass.      Tenderness: There is no abdominal tenderness. There is no guarding or rebound.   Musculoskeletal: Normal range of motion.         General: Swelling present. No tenderness.   Lymphadenopathy:      Cervical: No cervical adenopathy.   Skin:     General: Skin is warm and dry.      Coloration: Skin is pale.   Neurological:      Mental Status: She is lethargic and disoriented.         Fluids    Intake/Output Summary (Last 24 hours) at 5/14/2020 1547  Last data filed at 5/14/2020 0600  Gross per 24 hour   Intake --   Output 700 ml   Net -700 ml       Laboratory  Recent Labs     05/12/20  0420 05/12/20  1652 05/13/20  0254   WBC 21.3* 21.3* 19.7*   RBC 5.00 4.94 4.70   HEMOGLOBIN 13.0 12.9 12.3   HEMATOCRIT 46.1 44.7 41.9   MCV 92.2 90.5 89.1   MCH 26.0* 26.1* 26.2*   MCHC 28.2* 28.9* 29.4*   RDW 64.5*  63.5* 62.5*   PLATELETCT 219 248 221   MPV 11.5 12.4 11.9     Recent Labs     05/12/20  0420 05/12/20  1652 05/13/20  0254   SODIUM 139 139 140   POTASSIUM 5.1 4.4 4.4   CHLORIDE 96 96 100   CO2 31 32 30   GLUCOSE 138* 156* 134*   BUN 89* 94* 96*   CREATININE 1.33 1.42* 1.28   CALCIUM 9.5 9.1 8.8                   Imaging  DX-CHEST-PORTABLE (1 VIEW)   Final Result      Stable chest. Unchanged minor bibasilar interstitial and alveolar edema or pneumonia.      DX-CHEST-PORTABLE (1 VIEW)   Final Result      No significant change from prior exam.      DX-CHEST-PORTABLE (1 VIEW)   Final Result      1.  Interval extubation.   2.  Ongoing bibasilar opacities. No significant worsening post extubation.   3.  Suspect minimal left pleural effusion.      DX-CHEST-PORTABLE (1 VIEW)   Final Result      1.  Bibasilar underinflation atelectasis which could obscure an additional process. This is unchanged.   2.  Probable small LEFT pleural effusion      CT-SOFT TISSUE NECK W/O   Final Result      1.  CT of the neck soft tissues without contrast within normal limits.   2.  5 mm LEFT upper lobe pulmonary nodule      RECOMMENDATION FOR PULMONARY NODULE EVALUATION:   Low Risk: No routine follow-up      High Risk: Optional CT at 12 months      Comments: Nodules less than 6 mm do not require routine follow-up, but certain patients at high risk with suspicious nodule morphology, upper lobe location, or both may warrant 12-month follow-up.      Low Risk - Minimal or absent history of smoking and of other known risk factors.      High Risk - History of smoking or of other known risk factors.      Note: These recommendations do not apply to lung cancer screening, patients with immunosuppression, or patients with known primary cancer.      Fleischner Society 2017 Guidelines for Management of Incidentally Detected Pulmonary Nodules in Adults      DX-CHEST-PORTABLE (1 VIEW)   Final Result         1. Stable lines and tubes.   2. Mild left basilar  atelectasis. Interstitial edema has essentially resolved.         DX-CHEST-PORTABLE (1 VIEW)   Final Result         1. Stable lines and tubes.   2. Minimal interstitial edema, similar to prior study. No new consolidation or pleural effusions.         DX-CHEST-PORTABLE (1 VIEW)   Final Result         1. Lines and tubes as above.   2. No new consolidation or pleural effusions. Interstitial edema has essentially resolved.         DX-CHEST-PORTABLE (1 VIEW)   Final Result      No significant interval change.            DX-ABDOMEN FOR TUBE PLACEMENT   Final Result      Cortrak feeding tube tip projects in the region of the gastric fundus medially.      DX-CHEST-PORTABLE (1 VIEW)   Final Result      No significant interval change.         DX-CHEST-PORTABLE (1 VIEW)   Final Result      No significant interval change.      CT-CTA CHEST PULMONARY ARTERY W/ RECONS   Final Result      1.  No pulmonary embolus. Enlarged pulmonary artery, which may be due to underlying pulmonary hypertension.   2.  Cardiomegaly.   3.  Trace pleural effusions and associated atelectasis.            DX-CHEST-PORTABLE (1 VIEW)   Final Result         1. No significant interval change.      DX-CHEST-PORTABLE (1 VIEW)   Final Result         1. No significant interval change.      EC-ECHOCARDIOGRAM COMPLETE W/O CONT   Final Result      US-EXTREMITY VENOUS LOWER BILAT   Final Result      [id number] VIEW   Final Result      1.  Feeding tube tip overlies the gastric antrum.      DX-CHEST-PORTABLE (1 VIEW)   Final Result      Stable chest. Persistent left lower lobe atelectasis or pneumonia.      DX-CHEST-PORTABLE (1 VIEW)   Final Result      Stable chest. Persistent left lower lobe atelectasis or pneumonia.      DX-CHEST-FOR LINE PLACEMENT Perform procedure in: Patient's Room   Final Result      1.  Satisfactory positioning of endotracheal tube tip.      2.  Right IJV central line tip projects in satisfactory position. No pneumothorax.      3.  Unchanged  mild central vascular congestion.      4.  Minimal atelectasis or pneumonia in the left lower lobe.      DX-CHEST-LIMITED (1 VIEW)   Final Result      Mild prominence of the pulmonary interstitium. No lobar consolidation or gross congestive failure.      OUTSIDE IMAGES-DX CHEST   Final Result           Assessment/Plan  * Acute respiratory failure with hypoxia and hypercapnia (HCC)  Assessment & Plan  Patient currently on comfort care measures only    Atrial flutter (HCC)  Assessment & Plan  Comfort care only    Acute on chronic diastolic heart failure (HCC)  Assessment & Plan  Decompensated  The patient was attempted to be treated aggressively, she currently is placed on comfort care measures    Aortic stenosis  Assessment & Plan  Not a invasive candidate    Acute renal failure (ARF) (HCC)  Assessment & Plan  Comfort care measures      Comfort measures only status  Assessment & Plan  Orders implemented, will monitor for effectiveness    BMI 38.0-38.9,adult- (present on admission)  Assessment & Plan  Chronic    Acute encephalopathy  Assessment & Plan  Initially thought secondary to sepsis, does then treated, currently this is likely secondary to the patient's hypoxia     Plan  Assumed care from and the intensivist,  Continue comfort care measures only  Medication regimen reviewed  See orders  Acute complex high risk  VTE prophylaxis: Not indicated    I have performed a physical exam and reviewed and updated ROS and Plan today . In review of yesterday's note , there are no changes except as documented above.        Please note that this dictation was created using voice recognition software. I have made every reasonable attempt to correct obvious errors, but I expect that there are errors of grammar and possibly context that I did not discover before finalizing the note.

## 2020-05-15 NOTE — CARE PLAN
Problem: Safety  Goal: Will remain free from injury  Outcome: PROGRESSING AS EXPECTED     Problem: Pain Management  Goal: Pain level will decrease to patient's comfort goal  Outcome: PROGRESSING AS EXPECTED     Problem: Safety  Goal: Free from accidental injury  Outcome: PROGRESSING AS EXPECTED

## 2020-05-15 NOTE — PROGRESS NOTES
[facility] Medicine Daily Progress Note    Date of Service  5/15/2020    Chief Complaint  80 y.o. female admitted 5/1/2020 with initial admission with increasing dyspnea, congestive heart failure, pulmonary pretension, was initially evaluated and required intubation, the patient initially also found with renal failure, stenosis, but amount of atrial fib flutter which required cardioversion, the patient was treated for suspected pneumonia, she remained hemodynamically unstable with need for on and off fluid pushes, pressors were used, on 5/14 the patient apparently started to take off her oxygen device that voiced that she would not want to be further aggressively treated.  Patient refused oxygen despite D satting down into the 60% range, the patient's CODE STATUS was changed to DNR/DNI, Dr. Knox from the intensive care service reevaluated the patient and the decision was reached at the patient should be treated for comfort only, her status was changed to comfort focused care on 5/14 the patient is transferred to the medical unit for now.  The patient is off oxygen, hypoxic, she is awake but lethargic and confused.  Currently she does not appear to be in distress or suffering    Hospital Course    Patient admitted with severe pulmonary pretension, atrial fibrillation, respiratory failure, treated aggressively and after progressive failure and the patient's refusal of care transferred to a with comfort care status only      Interval Problem Update  Patient seen and examined today.    Patient tolerating treatment and therapies.  All Data, Medication data reviewed.  Case discussed with nursing as available.  Plan of Care reviewed with patient and notified of changes.  5/14 the patient is seen on the medical unit, she denies current chest pain, no respiratory distress, the patient appears hypoxic and confused, comfort care orders are in place  5/15 the patient is resting fairly comfortably, slight grimacing with  respirations, discussed with nursing, case management, as well as the patient's niece.  Attempt to explore the possibility of home hospice  Consultants/Specialty  Critical care/pulmonary  Cardiology  Code Status  DNR/DNI/comfort care, question home hospice    Disposition  TBD question of home hospice disposition    Review of Systems  Review of Systems   Unable to perform ROS: Mental acuity        Physical Exam  Temp:  [36.1 °C (97 °F)-36.6 °C (97.9 °F)] 36.6 °C (97.9 °F)  Pulse:  [] 80  Resp:  [21-28] 28  BP: (118-120)/(52-57) 120/57  SpO2:  [65 %-72 %] 65 %    Physical Exam  Vitals signs and nursing note reviewed.   Constitutional:       Appearance: She is well-developed. She is ill-appearing. She is not diaphoretic.   HENT:      Head: Normocephalic and atraumatic.      Nose: Nose normal.   Eyes:      Conjunctiva/sclera: Conjunctivae normal.      Pupils: Pupils are equal, round, and reactive to light.   Neck:      Musculoskeletal: Normal range of motion and neck supple.      Thyroid: No thyromegaly.      Vascular: No JVD.   Cardiovascular:      Rate and Rhythm: Tachycardia present.      Heart sounds: Normal heart sounds. No friction rub. No gallop.    Pulmonary:      Effort: Pulmonary effort is normal.      Breath sounds: Rhonchi and rales present.   Abdominal:      General: Bowel sounds are normal. There is no distension.      Palpations: Abdomen is soft. There is no mass.      Tenderness: There is no abdominal tenderness. There is no guarding or rebound.   Musculoskeletal: Normal range of motion.         General: Swelling present. No tenderness.   Lymphadenopathy:      Cervical: No cervical adenopathy.   Skin:     General: Skin is warm and dry.      Coloration: Skin is pale.   Neurological:      Mental Status: She is lethargic and disoriented.         Fluids    Intake/Output Summary (Last 24 hours) at 5/15/2020 8586  Last data filed at 5/15/2020 0430  Gross per 24 hour   Intake --   Output 750 ml   Net -750  ml       Laboratory  Recent Labs     05/12/20  1652 05/13/20  0254   WBC 21.3* 19.7*   RBC 4.94 4.70   HEMOGLOBIN 12.9 12.3   HEMATOCRIT 44.7 41.9   MCV 90.5 89.1   MCH 26.1* 26.2*   MCHC 28.9* 29.4*   RDW 63.5* 62.5*   PLATELETCT 248 221   MPV 12.4 11.9     Recent Labs     05/12/20  1652 05/13/20  0254   SODIUM 139 140   POTASSIUM 4.4 4.4   CHLORIDE 96 100   CO2 32 30   GLUCOSE 156* 134*   BUN 94* 96*   CREATININE 1.42* 1.28   CALCIUM 9.1 8.8                   Imaging  DX-CHEST-PORTABLE (1 VIEW)   Final Result      Stable chest. Unchanged minor bibasilar interstitial and alveolar edema or pneumonia.      DX-CHEST-PORTABLE (1 VIEW)   Final Result      No significant change from prior exam.      DX-CHEST-PORTABLE (1 VIEW)   Final Result      1.  Interval extubation.   2.  Ongoing bibasilar opacities. No significant worsening post extubation.   3.  Suspect minimal left pleural effusion.      DX-CHEST-PORTABLE (1 VIEW)   Final Result      1.  Bibasilar underinflation atelectasis which could obscure an additional process. This is unchanged.   2.  Probable small LEFT pleural effusion      CT-SOFT TISSUE NECK W/O   Final Result      1.  CT of the neck soft tissues without contrast within normal limits.   2.  5 mm LEFT upper lobe pulmonary nodule      RECOMMENDATION FOR PULMONARY NODULE EVALUATION:   Low Risk: No routine follow-up      High Risk: Optional CT at 12 months      Comments: Nodules less than 6 mm do not require routine follow-up, but certain patients at high risk with suspicious nodule morphology, upper lobe location, or both may warrant 12-month follow-up.      Low Risk - Minimal or absent history of smoking and of other known risk factors.      High Risk - History of smoking or of other known risk factors.      Note: These recommendations do not apply to lung cancer screening, patients with immunosuppression, or patients with known primary cancer.      Fleischner Society 2017 Guidelines for Management of  Incidentally Detected Pulmonary Nodules in Adults      DX-CHEST-PORTABLE (1 VIEW)   Final Result         1. Stable lines and tubes.   2. Mild left basilar atelectasis. Interstitial edema has essentially resolved.         DX-CHEST-PORTABLE (1 VIEW)   Final Result         1. Stable lines and tubes.   2. Minimal interstitial edema, similar to prior study. No new consolidation or pleural effusions.         DX-CHEST-PORTABLE (1 VIEW)   Final Result         1. Lines and tubes as above.   2. No new consolidation or pleural effusions. Interstitial edema has essentially resolved.         DX-CHEST-PORTABLE (1 VIEW)   Final Result      No significant interval change.            DX-ABDOMEN FOR TUBE PLACEMENT   Final Result      Cortrak feeding tube tip projects in the region of the gastric fundus medially.      DX-CHEST-PORTABLE (1 VIEW)   Final Result      No significant interval change.         DX-CHEST-PORTABLE (1 VIEW)   Final Result      No significant interval change.      CT-CTA CHEST PULMONARY ARTERY W/ RECONS   Final Result      1.  No pulmonary embolus. Enlarged pulmonary artery, which may be due to underlying pulmonary hypertension.   2.  Cardiomegaly.   3.  Trace pleural effusions and associated atelectasis.            DX-CHEST-PORTABLE (1 VIEW)   Final Result         1. No significant interval change.      DX-CHEST-PORTABLE (1 VIEW)   Final Result         1. No significant interval change.      EC-ECHOCARDIOGRAM COMPLETE W/O CONT   Final Result      US-EXTREMITY VENOUS LOWER BILAT   Final Result      [id number] VIEW   Final Result      1.  Feeding tube tip overlies the gastric antrum.      DX-CHEST-PORTABLE (1 VIEW)   Final Result      Stable chest. Persistent left lower lobe atelectasis or pneumonia.      DX-CHEST-PORTABLE (1 VIEW)   Final Result      Stable chest. Persistent left lower lobe atelectasis or pneumonia.      DX-CHEST-FOR LINE PLACEMENT Perform procedure in: Patient's Room   Final Result      1.   Satisfactory positioning of endotracheal tube tip.      2.  Right IJV central line tip projects in satisfactory position. No pneumothorax.      3.  Unchanged mild central vascular congestion.      4.  Minimal atelectasis or pneumonia in the left lower lobe.      DX-CHEST-LIMITED (1 VIEW)   Final Result      Mild prominence of the pulmonary interstitium. No lobar consolidation or gross congestive failure.      OUTSIDE IMAGES-DX CHEST   Final Result           Assessment/Plan  * Acute respiratory failure with hypoxia and hypercapnia (HCC)  Assessment & Plan  Patient currently on comfort care measures only    Atrial flutter (HCC)  Assessment & Plan  Comfort care only    Acute on chronic diastolic heart failure (HCC)  Assessment & Plan  Decompensated  The patient was attempted to be treated aggressively, she currently is placed on comfort care measures    Aortic stenosis  Assessment & Plan  Not a invasive candidate    Acute renal failure (ARF) (HCC)  Assessment & Plan  Comfort care measures      Comfort measures only status  Assessment & Plan  Orders implemented, will monitor for effectiveness    BMI 38.0-38.9,adult- (present on admission)  Assessment & Plan  Chronic    Acute encephalopathy  Assessment & Plan  Initially thought secondary to sepsis, does then treated, currently this is likely secondary to the patient's hypoxia     Plan  Evaluate for possibility of home hospice  Continue comfort care measures only  Medication regimen reviewed  See orders  Acute complex high risk  VTE prophylaxis: Not indicated    I have performed a physical exam and reviewed and updated ROS and Plan today . In review of yesterday's note , there are no changes except as documented above.        Please note that this dictation was created using voice recognition software. I have made every reasonable attempt to correct obvious errors, but I expect that there are errors of grammar and possibly context that I did not discover before finalizing  the note.

## 2020-05-15 NOTE — DISCHARGE PLANNING
Anticipated Discharge Disposition:   Hospice  [facility]    Action:    RN DOROTHY spoke with patient's niece Ariane Pruitt [phone]. She is the medical decision maker.  She requested an update on patient's condition.  [place] text to Dr. Trimble.  Pt is a . Her friend Sabina is POA for finances.    Barriers to Discharge:    Hospice choice  Placement    Plan:    F/U with patient's niece, Ariane.  F/U with patient's friend, Sabina.

## 2020-05-15 NOTE — CARE PLAN
Problem: Communication  Goal: The ability to communicate needs accurately and effectively will improve  Outcome: PROGRESSING AS EXPECTED     Problem: Safety  Goal: Will remain free from injury  Outcome: PROGRESSING AS EXPECTED  Goal: Will remain free from falls  Outcome: PROGRESSING AS EXPECTED     Problem: Knowledge Deficit  Goal: Knowledge of disease process/condition, treatment plan, diagnostic tests, and medications will improve  Outcome: PROGRESSING AS EXPECTED  Goal: Knowledge of the prescribed therapeutic regimen will improve  Outcome: PROGRESSING AS EXPECTED     Problem: Pain Management  Goal: Pain level will decrease to patient's comfort goal  Outcome: PROGRESSING AS EXPECTED     Problem: Skin Integrity  Goal: Risk for impaired skin integrity will decrease  Outcome: PROGRESSING AS EXPECTED     Problem: Safety  Goal: Free from accidental injury  Outcome: PROGRESSING AS EXPECTED     Problem: Knowledge Deficit  Goal: Patient/Significant Other/Family demonstrate understanding of dying process and grieving.  Outcome: PROGRESSING AS EXPECTED     Problem: Psychosocial needs  Goal: Spiritual needs incorporated in hospitalization  Outcome: PROGRESSING AS EXPECTED  Goal: Cultural needs incorporated in hospitalization  Outcome: PROGRESSING AS EXPECTED  Goal: Anxiety reduction  Outcome: PROGRESSING AS EXPECTED  Goal: Privacy for patient and family  Outcome: PROGRESSING AS EXPECTED     Problem: Pain  Goal: Alleviation of Pain or a reduction in pain to the patient's comfort goal  Outcome: PROGRESSING AS EXPECTED

## 2020-05-16 VITALS
BODY MASS INDEX: 36.31 KG/M2 | WEIGHT: 197.31 LBS | HEIGHT: 62 IN | TEMPERATURE: 97.5 F | OXYGEN SATURATION: 92 % | RESPIRATION RATE: 12 BRPM | DIASTOLIC BLOOD PRESSURE: 76 MMHG | SYSTOLIC BLOOD PRESSURE: 128 MMHG | HEART RATE: 109 BPM

## 2020-05-16 PROBLEM — R65.21 SEPTIC SHOCK (HCC): Status: RESOLVED | Noted: 2020-01-01 | Resolved: 2020-05-16

## 2020-05-16 PROBLEM — R19.7 DIARRHEA: Status: RESOLVED | Noted: 2020-01-01 | Resolved: 2020-05-16

## 2020-05-16 PROBLEM — A41.9 SEPTIC SHOCK (HCC): Status: RESOLVED | Noted: 2020-01-01 | Resolved: 2020-05-16

## 2020-05-16 PROCEDURE — 700111 HCHG RX REV CODE 636 W/ 250 OVERRIDE (IP): Performed by: HOSPITALIST

## 2020-05-16 RX ADMIN — MORPHINE SULFATE 2 MG: 10 INJECTION INTRAVENOUS at 00:15

## 2020-05-16 NOTE — CARE PLAN
Problem: Communication  Goal: The ability to communicate needs accurately and effectively will improve  Outcome: PROGRESSING AS EXPECTED     Problem: Pain Management  Goal: Pain level will decrease to patient's comfort goal  Outcome: PROGRESSING AS EXPECTED     Problem: Psychosocial needs  Goal: Privacy for patient and family  Outcome: PROGRESSING AS EXPECTED

## 2020-05-16 NOTE — DISCHARGE SUMMARY
Death Summary    Cause of Death  Acute cardiopulmonary arrest due to comfort care measures only status due to progressive respiratory failure due to severe pulmonary hypertension, pneumonia, cardiac dysrhythmia, advanced age.    Comorbid Conditions at the Time of Death  Principal Problem:    Acute respiratory failure with hypoxia and hypercapnia (HCC) POA: Unknown  Active Problems:    Acute on chronic diastolic heart failure (HCC) POA: Unknown    Atrial flutter (HCC) POA: Unknown    Pulmonary hypertension (HCC) POA: Unknown    Essential hypertension (Chronic) POA: Unknown    Acute renal failure (ARF) (HCC) POA: Unknown    Aortic stenosis POA: Unknown    Pneumonia due to infectious organism POA: Unknown    BMI 38.0-38.9,adult POA: Yes    Prolonged Q-T interval on ECG POA: Unknown    Comfort measures only status POA: Unknown    Acute encephalopathy POA: Unknown    Hypophosphatemia POA: Unknown    Hypokalemia POA: Unknown  Resolved Problems:    Septic shock (HCC) POA: Unknown    Diarrhea POA: Unknown    Suspected COVID-19 virus infection POA: Unknown      History of Presenting Illness and Hospital Course  This is a 80 y.o. female admitted 2020 with initial presentation with dyspnea, congestive heart failure, pulmonary hypertension, was treated aggressively in intensive care, required intubation, had intermittent renal failure, found to have aortic stenosis, atrial fibrillation flutter.  Despite aggressive medical treatment the patient failed to improve to her previous baseline, she remained hypoxic and after prolonged discussion with the patient by intensive care physicians she decided to want not to prolong her life and she asked to be transition to comfort measures only.  This was carried out and the patient  on comfort care measures only    Death Date: 20   Death Time: 0030         Pronounced By (RN1): Aleisha Bhat  Pronounced By (RN2): Demarcus Amor

## 2020-05-16 NOTE — PROGRESS NOTES
Assumed care of patient at 1900. Bedside report received by dayshift RN. Pt is tachypneic, sitting in tripod position, using accessory muscles to breath. Code status reviewed with vanda RN, pt is comfort measures only. Pt refusing supplemental oxygen at this time, recently medicated by vanda RN with PRN Morphine and declines any other interventions for comfort at this time. Patients friend Sabina at bedside, pt and Sabina encouraged to ring call bell for any comfort needs.

## 2020-05-16 NOTE — PROGRESS NOTES
12 hour chart check.     Pt very SOB, breathing is labored and tachypneic. O2 sats ~50-60%. Refusing O2 or morphine to help air hunger.